# Patient Record
Sex: FEMALE | Race: WHITE | Employment: UNEMPLOYED | URBAN - METROPOLITAN AREA
[De-identification: names, ages, dates, MRNs, and addresses within clinical notes are randomized per-mention and may not be internally consistent; named-entity substitution may affect disease eponyms.]

---

## 2017-01-11 RX ORDER — DILTIAZEM HYDROCHLORIDE 360 MG/1
CAPSULE, EXTENDED RELEASE ORAL
Qty: 90 CAPSULE | Refills: 0 | Status: SHIPPED | OUTPATIENT
Start: 2017-01-11 | End: 2017-04-12 | Stop reason: SDUPTHER

## 2017-01-11 RX ORDER — GLIMEPIRIDE 4 MG/1
TABLET ORAL
Qty: 90 TABLET | Refills: 0 | Status: SHIPPED | OUTPATIENT
Start: 2017-01-11 | End: 2017-04-12 | Stop reason: SDUPTHER

## 2017-01-11 RX ORDER — PRAVASTATIN SODIUM 40 MG
TABLET ORAL
Qty: 90 TABLET | Refills: 0 | Status: SHIPPED | OUTPATIENT
Start: 2017-01-11 | End: 2017-04-12 | Stop reason: SDUPTHER

## 2017-01-11 RX ORDER — PIOGLITAZONEHYDROCHLORIDE 30 MG/1
TABLET ORAL
Qty: 90 TABLET | Refills: 0 | Status: SHIPPED | OUTPATIENT
Start: 2017-01-11 | End: 2017-04-12 | Stop reason: SDUPTHER

## 2017-01-11 RX ORDER — VALSARTAN AND HYDROCHLOROTHIAZIDE 160; 25 MG/1; MG/1
TABLET ORAL
Qty: 90 TABLET | Refills: 0 | Status: SHIPPED | OUTPATIENT
Start: 2017-01-11 | End: 2017-04-12 | Stop reason: SDUPTHER

## 2017-02-23 ENCOUNTER — OFFICE VISIT (OUTPATIENT)
Dept: INTERNAL MEDICINE CLINIC | Age: 72
End: 2017-02-23

## 2017-02-23 VITALS
BODY MASS INDEX: 44.18 KG/M2 | WEIGHT: 234 LBS | RESPIRATION RATE: 14 BRPM | DIASTOLIC BLOOD PRESSURE: 70 MMHG | HEIGHT: 61 IN | HEART RATE: 80 BPM | SYSTOLIC BLOOD PRESSURE: 130 MMHG

## 2017-02-23 DIAGNOSIS — I10 ESSENTIAL HYPERTENSION: ICD-10-CM

## 2017-02-23 DIAGNOSIS — F20.0 PARANOID TYPE SCHIZOPHRENIA (HCC): ICD-10-CM

## 2017-02-23 DIAGNOSIS — E11.69 HYPERLIPIDEMIA ASSOCIATED WITH TYPE 2 DIABETES MELLITUS (HCC): ICD-10-CM

## 2017-02-23 DIAGNOSIS — E11.9 TYPE 2 DIABETES MELLITUS WITHOUT COMPLICATION, WITHOUT LONG-TERM CURRENT USE OF INSULIN (HCC): ICD-10-CM

## 2017-02-23 DIAGNOSIS — E66.01 MORBID OBESITY WITH BMI OF 40.0-44.9, ADULT (HCC): ICD-10-CM

## 2017-02-23 DIAGNOSIS — E11.9 TYPE 2 DIABETES MELLITUS WITHOUT COMPLICATION, WITHOUT LONG-TERM CURRENT USE OF INSULIN (HCC): Primary | ICD-10-CM

## 2017-02-23 DIAGNOSIS — E78.5 HYPERLIPIDEMIA ASSOCIATED WITH TYPE 2 DIABETES MELLITUS (HCC): ICD-10-CM

## 2017-02-23 PROCEDURE — G8484 FLU IMMUNIZE NO ADMIN: HCPCS | Performed by: INTERNAL MEDICINE

## 2017-02-23 PROCEDURE — 3014F SCREEN MAMMO DOC REV: CPT | Performed by: INTERNAL MEDICINE

## 2017-02-23 PROCEDURE — G8427 DOCREV CUR MEDS BY ELIG CLIN: HCPCS | Performed by: INTERNAL MEDICINE

## 2017-02-23 PROCEDURE — 3017F COLORECTAL CA SCREEN DOC REV: CPT | Performed by: INTERNAL MEDICINE

## 2017-02-23 PROCEDURE — G8400 PT W/DXA NO RESULTS DOC: HCPCS | Performed by: INTERNAL MEDICINE

## 2017-02-23 PROCEDURE — 1123F ACP DISCUSS/DSCN MKR DOCD: CPT | Performed by: INTERNAL MEDICINE

## 2017-02-23 PROCEDURE — 99214 OFFICE O/P EST MOD 30 MIN: CPT | Performed by: INTERNAL MEDICINE

## 2017-02-23 PROCEDURE — 4040F PNEUMOC VAC/ADMIN/RCVD: CPT | Performed by: INTERNAL MEDICINE

## 2017-02-23 PROCEDURE — 3044F HG A1C LEVEL LT 7.0%: CPT | Performed by: INTERNAL MEDICINE

## 2017-02-23 PROCEDURE — 1036F TOBACCO NON-USER: CPT | Performed by: INTERNAL MEDICINE

## 2017-02-23 PROCEDURE — 1090F PRES/ABSN URINE INCON ASSESS: CPT | Performed by: INTERNAL MEDICINE

## 2017-02-23 PROCEDURE — G8417 CALC BMI ABV UP PARAM F/U: HCPCS | Performed by: INTERNAL MEDICINE

## 2017-02-23 ASSESSMENT — ENCOUNTER SYMPTOMS
NAUSEA: 0
SHORTNESS OF BREATH: 1
VOMITING: 0
ABDOMINAL PAIN: 0
WHEEZING: 0
RHINORRHEA: 0

## 2017-02-24 LAB
ESTIMATED AVERAGE GLUCOSE: 142.7 MG/DL
HBA1C MFR BLD: 6.6 %

## 2017-04-12 RX ORDER — PRAVASTATIN SODIUM 40 MG
TABLET ORAL
Qty: 90 TABLET | Refills: 0 | Status: SHIPPED | OUTPATIENT
Start: 2017-04-12 | End: 2017-07-06 | Stop reason: SDUPTHER

## 2017-04-12 RX ORDER — VALSARTAN AND HYDROCHLOROTHIAZIDE 160; 25 MG/1; MG/1
TABLET ORAL
Qty: 90 TABLET | Refills: 0 | Status: SHIPPED | OUTPATIENT
Start: 2017-04-12 | End: 2017-07-06 | Stop reason: SDUPTHER

## 2017-04-12 RX ORDER — PIOGLITAZONEHYDROCHLORIDE 30 MG/1
TABLET ORAL
Qty: 90 TABLET | Refills: 0 | Status: SHIPPED | OUTPATIENT
Start: 2017-04-12 | End: 2017-07-06 | Stop reason: SDUPTHER

## 2017-04-12 RX ORDER — GLIMEPIRIDE 4 MG/1
TABLET ORAL
Qty: 90 TABLET | Refills: 0 | Status: SHIPPED | OUTPATIENT
Start: 2017-04-12 | End: 2017-07-06 | Stop reason: SDUPTHER

## 2017-04-12 RX ORDER — DILTIAZEM HYDROCHLORIDE 360 MG/1
CAPSULE, EXTENDED RELEASE ORAL
Qty: 90 CAPSULE | Refills: 0 | Status: SHIPPED | OUTPATIENT
Start: 2017-04-12 | End: 2017-07-07

## 2017-07-07 RX ORDER — DILTIAZEM HYDROCHLORIDE 360 MG/1
CAPSULE, EXTENDED RELEASE ORAL
Qty: 90 CAPSULE | Refills: 0 | Status: SHIPPED | OUTPATIENT
Start: 2017-07-07 | End: 2017-10-06 | Stop reason: SDUPTHER

## 2017-07-07 RX ORDER — VALSARTAN AND HYDROCHLOROTHIAZIDE 160; 25 MG/1; MG/1
TABLET ORAL
Qty: 90 TABLET | Refills: 0 | Status: SHIPPED | OUTPATIENT
Start: 2017-07-07 | End: 2017-10-06 | Stop reason: SDUPTHER

## 2017-07-07 RX ORDER — GLIMEPIRIDE 4 MG/1
TABLET ORAL
Qty: 90 TABLET | Refills: 0 | Status: SHIPPED | OUTPATIENT
Start: 2017-07-07 | End: 2017-10-06 | Stop reason: SDUPTHER

## 2017-07-07 RX ORDER — PRAVASTATIN SODIUM 40 MG
TABLET ORAL
Qty: 90 TABLET | Refills: 0 | Status: SHIPPED | OUTPATIENT
Start: 2017-07-07 | End: 2017-10-06 | Stop reason: SDUPTHER

## 2017-07-07 RX ORDER — PIOGLITAZONEHYDROCHLORIDE 30 MG/1
TABLET ORAL
Qty: 90 TABLET | Refills: 0 | Status: SHIPPED | OUTPATIENT
Start: 2017-07-07 | End: 2017-10-06 | Stop reason: SDUPTHER

## 2017-07-24 ENCOUNTER — OFFICE VISIT (OUTPATIENT)
Dept: INTERNAL MEDICINE CLINIC | Age: 72
End: 2017-07-24

## 2017-07-24 VITALS
DIASTOLIC BLOOD PRESSURE: 80 MMHG | HEART RATE: 70 BPM | BODY MASS INDEX: 43.99 KG/M2 | RESPIRATION RATE: 14 BRPM | WEIGHT: 233 LBS | HEIGHT: 61 IN | SYSTOLIC BLOOD PRESSURE: 130 MMHG

## 2017-07-24 DIAGNOSIS — F20.0 PARANOID TYPE SCHIZOPHRENIA (HCC): ICD-10-CM

## 2017-07-24 DIAGNOSIS — E11.9 TYPE 2 DIABETES MELLITUS WITHOUT COMPLICATION, WITHOUT LONG-TERM CURRENT USE OF INSULIN (HCC): ICD-10-CM

## 2017-07-24 DIAGNOSIS — Z00.00 ROUTINE GENERAL MEDICAL EXAMINATION AT A HEALTH CARE FACILITY: Primary | ICD-10-CM

## 2017-07-24 DIAGNOSIS — E11.69 HYPERLIPIDEMIA ASSOCIATED WITH TYPE 2 DIABETES MELLITUS (HCC): ICD-10-CM

## 2017-07-24 DIAGNOSIS — Z12.11 SCREEN FOR COLON CANCER: ICD-10-CM

## 2017-07-24 DIAGNOSIS — Z12.39 BREAST CANCER SCREENING: ICD-10-CM

## 2017-07-24 DIAGNOSIS — E66.01 MORBID OBESITY WITH BMI OF 40.0-44.9, ADULT (HCC): ICD-10-CM

## 2017-07-24 DIAGNOSIS — E78.5 HYPERLIPIDEMIA ASSOCIATED WITH TYPE 2 DIABETES MELLITUS (HCC): ICD-10-CM

## 2017-07-24 DIAGNOSIS — I10 ESSENTIAL HYPERTENSION: ICD-10-CM

## 2017-07-24 LAB
A/G RATIO: 1.7 (ref 1.1–2.2)
ALBUMIN SERPL-MCNC: 4.5 G/DL (ref 3.4–5)
ALP BLD-CCNC: 79 U/L (ref 40–129)
ALT SERPL-CCNC: 17 U/L (ref 10–40)
ANION GAP SERPL CALCULATED.3IONS-SCNC: 15 MMOL/L (ref 3–16)
AST SERPL-CCNC: 23 U/L (ref 15–37)
BASOPHILS ABSOLUTE: 0 K/UL (ref 0–0.2)
BASOPHILS RELATIVE PERCENT: 0.7 %
BILIRUB SERPL-MCNC: 0.3 MG/DL (ref 0–1)
BILIRUBIN, POC: NORMAL
BLOOD URINE, POC: NORMAL
BUN BLDV-MCNC: 16 MG/DL (ref 7–20)
CALCIUM SERPL-MCNC: 10 MG/DL (ref 8.3–10.6)
CHLORIDE BLD-SCNC: 97 MMOL/L (ref 99–110)
CHOLESTEROL, TOTAL: 148 MG/DL (ref 0–199)
CLARITY, POC: NORMAL
CO2: 29 MMOL/L (ref 21–32)
COLOR, POC: NORMAL
CREAT SERPL-MCNC: 1 MG/DL (ref 0.6–1.2)
CREATININE URINE: 148.9 MG/DL (ref 28–259)
EOSINOPHILS ABSOLUTE: 0.1 K/UL (ref 0–0.6)
EOSINOPHILS RELATIVE PERCENT: 1.6 %
GFR AFRICAN AMERICAN: >60
GFR NON-AFRICAN AMERICAN: 55
GLOBULIN: 2.7 G/DL
GLUCOSE BLD-MCNC: 180 MG/DL (ref 70–99)
GLUCOSE URINE, POC: NORMAL
HCT VFR BLD CALC: 43.9 % (ref 36–48)
HDLC SERPL-MCNC: 62 MG/DL (ref 40–60)
HEMOGLOBIN: 14.4 G/DL (ref 12–16)
KETONES, POC: NORMAL
LDL CHOLESTEROL CALCULATED: 61 MG/DL
LEUKOCYTE EST, POC: NORMAL
LYMPHOCYTES ABSOLUTE: 1.3 K/UL (ref 1–5.1)
LYMPHOCYTES RELATIVE PERCENT: 19.7 %
MCH RBC QN AUTO: 29.3 PG (ref 26–34)
MCHC RBC AUTO-ENTMCNC: 32.9 G/DL (ref 31–36)
MCV RBC AUTO: 89.2 FL (ref 80–100)
MICROALBUMIN UR-MCNC: <1.2 MG/DL
MICROALBUMIN/CREAT UR-RTO: NORMAL MG/G (ref 0–30)
MONOCYTES ABSOLUTE: 0.6 K/UL (ref 0–1.3)
MONOCYTES RELATIVE PERCENT: 8.4 %
NEUTROPHILS ABSOLUTE: 4.7 K/UL (ref 1.7–7.7)
NEUTROPHILS RELATIVE PERCENT: 69.6 %
NITRITE, POC: NORMAL
PDW BLD-RTO: 15.5 % (ref 12.4–15.4)
PH, POC: NORMAL
PLATELET # BLD: 239 K/UL (ref 135–450)
PMV BLD AUTO: 9 FL (ref 5–10.5)
POTASSIUM SERPL-SCNC: 4.1 MMOL/L (ref 3.5–5.1)
PROTEIN, POC: NORMAL
RBC # BLD: 4.92 M/UL (ref 4–5.2)
SODIUM BLD-SCNC: 141 MMOL/L (ref 136–145)
SPECIFIC GRAVITY, POC: NORMAL
TOTAL PROTEIN: 7.2 G/DL (ref 6.4–8.2)
TRIGL SERPL-MCNC: 127 MG/DL (ref 0–150)
UROBILINOGEN, POC: NORMAL
VLDLC SERPL CALC-MCNC: 25 MG/DL
WBC # BLD: 6.7 K/UL (ref 4–11)

## 2017-07-24 PROCEDURE — G0438 PPPS, INITIAL VISIT: HCPCS | Performed by: INTERNAL MEDICINE

## 2017-07-24 PROCEDURE — 81002 URINALYSIS NONAUTO W/O SCOPE: CPT | Performed by: INTERNAL MEDICINE

## 2017-07-24 ASSESSMENT — PATIENT HEALTH QUESTIONNAIRE - PHQ9: SUM OF ALL RESPONSES TO PHQ QUESTIONS 1-9: 0

## 2017-07-24 ASSESSMENT — ANXIETY QUESTIONNAIRES: GAD7 TOTAL SCORE: 0

## 2017-07-24 ASSESSMENT — LIFESTYLE VARIABLES: HOW OFTEN DO YOU HAVE A DRINK CONTAINING ALCOHOL: 0

## 2017-07-25 LAB
ESTIMATED AVERAGE GLUCOSE: 137 MG/DL
HBA1C MFR BLD: 6.4 %

## 2017-10-06 RX ORDER — PIOGLITAZONEHYDROCHLORIDE 30 MG/1
TABLET ORAL
Qty: 30 TABLET | Refills: 0 | Status: SHIPPED | OUTPATIENT
Start: 2017-10-06 | End: 2017-10-30 | Stop reason: SDUPTHER

## 2017-10-06 RX ORDER — PRAVASTATIN SODIUM 40 MG
TABLET ORAL
Qty: 30 TABLET | Refills: 0 | Status: SHIPPED | OUTPATIENT
Start: 2017-10-06 | End: 2017-10-30 | Stop reason: SDUPTHER

## 2017-10-06 RX ORDER — GLIMEPIRIDE 4 MG/1
TABLET ORAL
Qty: 30 TABLET | Refills: 0 | Status: SHIPPED | OUTPATIENT
Start: 2017-10-06 | End: 2017-10-30 | Stop reason: SDUPTHER

## 2017-10-06 RX ORDER — DILTIAZEM HYDROCHLORIDE 360 MG/1
CAPSULE, EXTENDED RELEASE ORAL
Qty: 30 CAPSULE | Refills: 0 | Status: SHIPPED | OUTPATIENT
Start: 2017-10-06 | End: 2017-10-30 | Stop reason: SDUPTHER

## 2017-10-06 RX ORDER — VALSARTAN AND HYDROCHLOROTHIAZIDE 160; 25 MG/1; MG/1
TABLET ORAL
Qty: 30 TABLET | Refills: 0 | Status: SHIPPED | OUTPATIENT
Start: 2017-10-06 | End: 2017-10-30 | Stop reason: SDUPTHER

## 2017-10-30 ENCOUNTER — OFFICE VISIT (OUTPATIENT)
Dept: INTERNAL MEDICINE CLINIC | Age: 72
End: 2017-10-30

## 2017-10-30 VITALS
WEIGHT: 232 LBS | SYSTOLIC BLOOD PRESSURE: 120 MMHG | BODY MASS INDEX: 43.8 KG/M2 | HEIGHT: 61 IN | RESPIRATION RATE: 14 BRPM | DIASTOLIC BLOOD PRESSURE: 70 MMHG | HEART RATE: 80 BPM

## 2017-10-30 DIAGNOSIS — E11.9 TYPE 2 DIABETES MELLITUS WITHOUT COMPLICATION, WITHOUT LONG-TERM CURRENT USE OF INSULIN (HCC): Primary | ICD-10-CM

## 2017-10-30 DIAGNOSIS — F20.0 PARANOID TYPE SCHIZOPHRENIA (HCC): ICD-10-CM

## 2017-10-30 DIAGNOSIS — E11.69 HYPERLIPIDEMIA ASSOCIATED WITH TYPE 2 DIABETES MELLITUS (HCC): ICD-10-CM

## 2017-10-30 DIAGNOSIS — Z23 NEED FOR INFLUENZA VACCINATION: ICD-10-CM

## 2017-10-30 DIAGNOSIS — I10 ESSENTIAL HYPERTENSION: ICD-10-CM

## 2017-10-30 DIAGNOSIS — E78.5 HYPERLIPIDEMIA ASSOCIATED WITH TYPE 2 DIABETES MELLITUS (HCC): ICD-10-CM

## 2017-10-30 DIAGNOSIS — E66.01 MORBID OBESITY WITH BMI OF 40.0-44.9, ADULT (HCC): ICD-10-CM

## 2017-10-30 PROCEDURE — G8417 CALC BMI ABV UP PARAM F/U: HCPCS | Performed by: INTERNAL MEDICINE

## 2017-10-30 PROCEDURE — 3044F HG A1C LEVEL LT 7.0%: CPT | Performed by: INTERNAL MEDICINE

## 2017-10-30 PROCEDURE — G8427 DOCREV CUR MEDS BY ELIG CLIN: HCPCS | Performed by: INTERNAL MEDICINE

## 2017-10-30 PROCEDURE — G8484 FLU IMMUNIZE NO ADMIN: HCPCS | Performed by: INTERNAL MEDICINE

## 2017-10-30 PROCEDURE — 90662 IIV NO PRSV INCREASED AG IM: CPT | Performed by: INTERNAL MEDICINE

## 2017-10-30 PROCEDURE — 99214 OFFICE O/P EST MOD 30 MIN: CPT | Performed by: INTERNAL MEDICINE

## 2017-10-30 PROCEDURE — 1090F PRES/ABSN URINE INCON ASSESS: CPT | Performed by: INTERNAL MEDICINE

## 2017-10-30 PROCEDURE — 3014F SCREEN MAMMO DOC REV: CPT | Performed by: INTERNAL MEDICINE

## 2017-10-30 PROCEDURE — G0008 ADMIN INFLUENZA VIRUS VAC: HCPCS | Performed by: INTERNAL MEDICINE

## 2017-10-30 PROCEDURE — 4040F PNEUMOC VAC/ADMIN/RCVD: CPT | Performed by: INTERNAL MEDICINE

## 2017-10-30 PROCEDURE — G8400 PT W/DXA NO RESULTS DOC: HCPCS | Performed by: INTERNAL MEDICINE

## 2017-10-30 PROCEDURE — 1123F ACP DISCUSS/DSCN MKR DOCD: CPT | Performed by: INTERNAL MEDICINE

## 2017-10-30 PROCEDURE — 1036F TOBACCO NON-USER: CPT | Performed by: INTERNAL MEDICINE

## 2017-10-30 PROCEDURE — 3017F COLORECTAL CA SCREEN DOC REV: CPT | Performed by: INTERNAL MEDICINE

## 2017-10-30 RX ORDER — VALSARTAN AND HYDROCHLOROTHIAZIDE 160; 25 MG/1; MG/1
TABLET ORAL
Qty: 90 TABLET | Refills: 0 | Status: SHIPPED | OUTPATIENT
Start: 2017-10-30 | End: 2018-01-29 | Stop reason: SDUPTHER

## 2017-10-30 RX ORDER — PIOGLITAZONEHYDROCHLORIDE 30 MG/1
TABLET ORAL
Qty: 90 TABLET | Refills: 0 | Status: SHIPPED | OUTPATIENT
Start: 2017-10-30 | End: 2018-01-29 | Stop reason: SDUPTHER

## 2017-10-30 RX ORDER — PRAVASTATIN SODIUM 40 MG
TABLET ORAL
Qty: 90 TABLET | Refills: 0 | Status: SHIPPED | OUTPATIENT
Start: 2017-10-30 | End: 2018-01-31 | Stop reason: SDUPTHER

## 2017-10-30 RX ORDER — DILTIAZEM HYDROCHLORIDE 360 MG/1
CAPSULE, EXTENDED RELEASE ORAL
Qty: 90 CAPSULE | Refills: 0 | Status: SHIPPED | OUTPATIENT
Start: 2017-10-30 | End: 2018-01-29 | Stop reason: SDUPTHER

## 2017-10-30 RX ORDER — GLIMEPIRIDE 4 MG/1
TABLET ORAL
Qty: 90 TABLET | Refills: 0 | Status: SHIPPED | OUTPATIENT
Start: 2017-10-30 | End: 2018-01-31 | Stop reason: SDUPTHER

## 2017-10-30 ASSESSMENT — ENCOUNTER SYMPTOMS
VOMITING: 0
ABDOMINAL PAIN: 0
NAUSEA: 0
SHORTNESS OF BREATH: 1
RHINORRHEA: 0
WHEEZING: 0

## 2017-10-30 NOTE — PATIENT INSTRUCTIONS
clutter. Repair loose carpet or raised areas in the floor. · Move furniture and electrical cords to keep them out of walking paths. · Use nonskid floor wax, and wipe up spills right away, especially on ceramic tile floors. · If you use a walker or cane, put rubber tips on it. If you use crutches, clean the bottoms of them regularly with an abrasive pad, such as steel wool. · Keep your house well lit, especially [place], and outside walkways. Use night-lights in areas such as hallways and bathrooms. Add extra light switches or use remote switches (such as switches that go on or off when you clap your hands) to make it easier to turn lights on if you have to get up during the night. · Install sturdy handrails on stairways. Put grab bars near your shower, bathtub, and toilet. · Store household items on low shelves so that you do not have to climb or reach high. Or use a reaching device that you can get at a medical supply store. If you have to climb for something, use a step stool with handrails, or ask someone to get it for you. · Keep a cordless phone and a flashlight with new batteries by your bed. If possible, put a phone in each of the main rooms of your house, or carry a cell phone in case you fall and cannot reach a phone. Or you can wear a device around your neck or wrist. You push a button that sends a signal for help. · Wear low-heeled shoes that fit well and give your feet good support. Use footwear with nonskid soles. Check the heels and soles of your shoes for wear. Repair or replace worn heels or soles. · Do not wear socks without shoes on wood floors. · Walk on the grass when the sidewalks are slippery. If you live in an area that gets snow and ice in the winter, sprinkle salt on slippery steps and sidewalks. Where can you learn more? Go to [email or web address] org and sign in to your [employer] account.  Enter L978 in the Gevo box to learn more about \"Diabetes and Preventing Falls: Care Instructions. \"     If you do not have an account, please click on the \"Sign Up Now\" link. Current as of: August 4, 2016  Content Version: 11.3  © 2722-8753 [employer]. Care instructions adapted under license by [facility] ([facility]). If you have questions about a medical condition or this instruction, always ask your healthcare professional. [address] any warranty or liability for your use of this information.

## 2017-10-30 NOTE — PROGRESS NOTES
systolic function with an  estimated ejection fraction of 55%. No regional wall motion abnormalities  are seen. Borderline concentric LVH. There is trivial tricuspid regurgitation. Unable to obtain SPAP due to lack of TR envelope. Assessment:      1. Type 2 diabetes mellitus without complication, without long-term current use of insulin ([facility] [facility]     2. Essential hypertension     3. Hyperlipidemia associated with type 2 diabetes mellitus ([facility] [place] [facility]     4. Morbid obesity with BMI of 40.0-44.9, adult ([facility] [place] [facility]     5. Paranoid type schizophrenia ([facility] [place] [facility]     6. Need for influenza vaccination  INFLUENZA, HIGH DOSE, 65 YRS +, IM, PF, PREFILL SYR, 0.5ML (FLUZONE HD)          Plan:          DM: Well controlled. Check A1C. Hypertension:  At goal.  On meds. Hyperlipidemia:  At goal.   Schizophrenia: on medication. OA: no changes. Heart murmur:  No issues. Diego Strong received counseling on the following healthy behaviors: nutrition and exercise  Reviewed prior labs and health maintenance  Continue current medications, diet and exercise. Discussed use, benefit, and side effects of prescribed medications. Barriers to medication compliance addressed. Patient given educational materials - see patient instructions  Was a self-tracking handout given in paper form or via [employer]?  Yes    Requested Prescriptions     Signed Prescriptions Disp Refills    glimepiride (AMARYL) 4 MG tablet 90 tablet 0     Sig: TAKE ONE TABLET BY MOUTH ONCE DAILY IN THE MORNING BEFORE  BREAKFAST    diltiazem (CARDIZEM CD) 360 MG extended release capsule 90 capsule 0     Sig: TAKE ONE CAPSULE BY MOUTH ONCE DAILY    pioglitazone (ACTOS) 30 MG tablet 90 tablet 0     Sig: TAKE ONE TABLET BY MOUTH ONCE DAILY    pravastatin (PRAVACHOL) 40 MG tablet 90 tablet 0     Sig: TAKE ONE TABLET BY MOUTH ONCE DAILY    valsartan-hydrochlorothiazide (DIOVAN-HCT) 160-25 MG per tablet 90 tablet 0     Sig: TAKE ONE TABLET BY MOUTH ONCE DAILY    Glucose Blood (BELINDA BREEZE

## 2018-01-22 RX ORDER — BLOOD-GLUCOSE METER
EACH MISCELLANEOUS
Qty: 1 KIT | Refills: 0 | Status: SHIPPED | OUTPATIENT
Start: 2018-01-22

## 2018-01-22 RX ORDER — LANCETS
EACH MISCELLANEOUS
Qty: 100 EACH | Refills: 11 | Status: SHIPPED | OUTPATIENT
Start: 2018-01-22 | End: 2019-02-06 | Stop reason: SDUPTHER

## 2018-01-22 RX ORDER — HYDROXYZINE HYDROCHLORIDE 25 MG/1
25 TABLET, FILM COATED ORAL DAILY PRN
COMMUNITY
End: 2019-05-22 | Stop reason: ALTCHOICE

## 2018-01-29 RX ORDER — PIOGLITAZONEHYDROCHLORIDE 30 MG/1
TABLET ORAL
Qty: 90 TABLET | Refills: 0 | Status: SHIPPED | OUTPATIENT
Start: 2018-01-29 | End: 2018-04-26 | Stop reason: SDUPTHER

## 2018-01-29 RX ORDER — VALSARTAN AND HYDROCHLOROTHIAZIDE 160; 25 MG/1; MG/1
TABLET ORAL
Qty: 90 TABLET | Refills: 0 | Status: SHIPPED | OUTPATIENT
Start: 2018-01-29 | End: 2018-04-26 | Stop reason: SDUPTHER

## 2018-01-29 RX ORDER — DILTIAZEM HYDROCHLORIDE 360 MG/1
CAPSULE, EXTENDED RELEASE ORAL
Qty: 90 CAPSULE | Refills: 0 | Status: SHIPPED | OUTPATIENT
Start: 2018-01-29 | End: 2018-04-26 | Stop reason: SDUPTHER

## 2018-01-31 RX ORDER — PRAVASTATIN SODIUM 40 MG
TABLET ORAL
Qty: 90 TABLET | Refills: 0 | Status: SHIPPED | OUTPATIENT
Start: 2018-01-31 | End: 2018-04-26 | Stop reason: SDUPTHER

## 2018-01-31 RX ORDER — GLIMEPIRIDE 4 MG/1
TABLET ORAL
Qty: 90 TABLET | Refills: 0 | Status: SHIPPED | OUTPATIENT
Start: 2018-01-31 | End: 2018-04-26 | Stop reason: SDUPTHER

## 2018-02-08 ENCOUNTER — OFFICE VISIT (OUTPATIENT)
Dept: INTERNAL MEDICINE CLINIC | Age: 73
End: 2018-02-08

## 2018-02-08 VITALS
BODY MASS INDEX: 43.8 KG/M2 | WEIGHT: 232 LBS | SYSTOLIC BLOOD PRESSURE: 128 MMHG | RESPIRATION RATE: 14 BRPM | DIASTOLIC BLOOD PRESSURE: 78 MMHG | HEART RATE: 70 BPM | HEIGHT: 61 IN

## 2018-02-08 DIAGNOSIS — E11.69 HYPERLIPIDEMIA ASSOCIATED WITH TYPE 2 DIABETES MELLITUS (HCC): ICD-10-CM

## 2018-02-08 DIAGNOSIS — F20.0 PARANOID TYPE SCHIZOPHRENIA (HCC): ICD-10-CM

## 2018-02-08 DIAGNOSIS — Z72.89 OTHER PROBLEMS RELATED TO LIFESTYLE: ICD-10-CM

## 2018-02-08 DIAGNOSIS — E11.9 TYPE 2 DIABETES MELLITUS WITHOUT COMPLICATION, WITHOUT LONG-TERM CURRENT USE OF INSULIN (HCC): Primary | ICD-10-CM

## 2018-02-08 DIAGNOSIS — E11.9 TYPE 2 DIABETES MELLITUS WITHOUT COMPLICATION, WITHOUT LONG-TERM CURRENT USE OF INSULIN (HCC): ICD-10-CM

## 2018-02-08 DIAGNOSIS — E78.5 HYPERLIPIDEMIA ASSOCIATED WITH TYPE 2 DIABETES MELLITUS (HCC): ICD-10-CM

## 2018-02-08 DIAGNOSIS — E66.01 MORBID OBESITY WITH BMI OF 40.0-44.9, ADULT (HCC): ICD-10-CM

## 2018-02-08 DIAGNOSIS — I10 ESSENTIAL HYPERTENSION: ICD-10-CM

## 2018-02-08 DIAGNOSIS — F20.0 PARANOID SCHIZOPHRENIA, SUBCHRONIC CONDITION (HCC): ICD-10-CM

## 2018-02-08 LAB
A/G RATIO: 1.7 (ref 1.1–2.2)
ALBUMIN SERPL-MCNC: 4.7 G/DL (ref 3.4–5)
ALP BLD-CCNC: 79 U/L (ref 40–129)
ALT SERPL-CCNC: 18 U/L (ref 10–40)
ANION GAP SERPL CALCULATED.3IONS-SCNC: 15 MMOL/L (ref 3–16)
AST SERPL-CCNC: 24 U/L (ref 15–37)
BASOPHILS ABSOLUTE: 0.1 K/UL (ref 0–0.2)
BASOPHILS RELATIVE PERCENT: 0.8 %
BILIRUB SERPL-MCNC: 0.3 MG/DL (ref 0–1)
BUN BLDV-MCNC: 17 MG/DL (ref 7–20)
CALCIUM SERPL-MCNC: 10.6 MG/DL (ref 8.3–10.6)
CHLORIDE BLD-SCNC: 100 MMOL/L (ref 99–110)
CO2: 28 MMOL/L (ref 21–32)
CREAT SERPL-MCNC: 0.9 MG/DL (ref 0.6–1.2)
EOSINOPHILS ABSOLUTE: 0.1 K/UL (ref 0–0.6)
EOSINOPHILS RELATIVE PERCENT: 1.6 %
GFR AFRICAN AMERICAN: >60
GFR NON-AFRICAN AMERICAN: >60
GLOBULIN: 2.7 G/DL
GLUCOSE BLD-MCNC: 119 MG/DL (ref 70–99)
HCT VFR BLD CALC: 45.6 % (ref 36–48)
HEMOGLOBIN: 14.7 G/DL (ref 12–16)
HEPATITIS C ANTIBODY INTERPRETATION: NORMAL
LYMPHOCYTES ABSOLUTE: 1.5 K/UL (ref 1–5.1)
LYMPHOCYTES RELATIVE PERCENT: 21.2 %
MCH RBC QN AUTO: 28.9 PG (ref 26–34)
MCHC RBC AUTO-ENTMCNC: 32.3 G/DL (ref 31–36)
MCV RBC AUTO: 89.6 FL (ref 80–100)
MONOCYTES ABSOLUTE: 0.7 K/UL (ref 0–1.3)
MONOCYTES RELATIVE PERCENT: 9.5 %
NEUTROPHILS ABSOLUTE: 4.7 K/UL (ref 1.7–7.7)
NEUTROPHILS RELATIVE PERCENT: 66.9 %
PDW BLD-RTO: 15.3 % (ref 12.4–15.4)
PLATELET # BLD: 244 K/UL (ref 135–450)
PMV BLD AUTO: 9.6 FL (ref 5–10.5)
POTASSIUM SERPL-SCNC: 4.1 MMOL/L (ref 3.5–5.1)
RBC # BLD: 5.09 M/UL (ref 4–5.2)
SODIUM BLD-SCNC: 143 MMOL/L (ref 136–145)
TOTAL PROTEIN: 7.4 G/DL (ref 6.4–8.2)
URIC ACID, SERUM: 5.5 MG/DL (ref 2.6–6)
WBC # BLD: 7 K/UL (ref 4–11)

## 2018-02-08 PROCEDURE — 4040F PNEUMOC VAC/ADMIN/RCVD: CPT | Performed by: INTERNAL MEDICINE

## 2018-02-08 PROCEDURE — 1036F TOBACCO NON-USER: CPT | Performed by: INTERNAL MEDICINE

## 2018-02-08 PROCEDURE — G8484 FLU IMMUNIZE NO ADMIN: HCPCS | Performed by: INTERNAL MEDICINE

## 2018-02-08 PROCEDURE — 1123F ACP DISCUSS/DSCN MKR DOCD: CPT | Performed by: INTERNAL MEDICINE

## 2018-02-08 PROCEDURE — G8427 DOCREV CUR MEDS BY ELIG CLIN: HCPCS | Performed by: INTERNAL MEDICINE

## 2018-02-08 PROCEDURE — G8417 CALC BMI ABV UP PARAM F/U: HCPCS | Performed by: INTERNAL MEDICINE

## 2018-02-08 PROCEDURE — 1090F PRES/ABSN URINE INCON ASSESS: CPT | Performed by: INTERNAL MEDICINE

## 2018-02-08 PROCEDURE — 3017F COLORECTAL CA SCREEN DOC REV: CPT | Performed by: INTERNAL MEDICINE

## 2018-02-08 PROCEDURE — G8400 PT W/DXA NO RESULTS DOC: HCPCS | Performed by: INTERNAL MEDICINE

## 2018-02-08 PROCEDURE — 99214 OFFICE O/P EST MOD 30 MIN: CPT | Performed by: INTERNAL MEDICINE

## 2018-02-08 PROCEDURE — 3046F HEMOGLOBIN A1C LEVEL >9.0%: CPT | Performed by: INTERNAL MEDICINE

## 2018-02-08 PROCEDURE — 3014F SCREEN MAMMO DOC REV: CPT | Performed by: INTERNAL MEDICINE

## 2018-02-08 ASSESSMENT — ENCOUNTER SYMPTOMS
ABDOMINAL PAIN: 0
SHORTNESS OF BREATH: 1
NAUSEA: 0
WHEEZING: 0
VOMITING: 0
RHINORRHEA: 0

## 2018-02-08 NOTE — PROGRESS NOTES
Subjective:      Patient ID: Yael Shelton is a 67 y.o. female. HPI    Patient is here for follow up of diabetes, hypertension, hyperlipidemia, OA, schizophrenia and heel pain. Patient's BGs range between 80 and 112 mg/dl. Patient does not have polydipsia and polyuria. She has been checking her sugar off and on. Her diabetes is well controlled. She is only taking her Amaryl once a day. No hypoglycemia. No vision issues. Patient's BP is controlled on current medications. No chest pain or dyspnea. Med compliant. Her cholesterol is at goal. No myalgias. She has mild edema. No chest pain. Review of Systems   Constitutional: Negative for appetite change and fatigue. HENT: Negative for postnasal drip and rhinorrhea. Respiratory: Positive for shortness of breath. Negative for wheezing. Cardiovascular: Negative for chest pain and palpitations. Gastrointestinal: Negative for abdominal pain, nausea and vomiting. Endocrine: Negative for polydipsia, polyphagia and polyuria. Musculoskeletal: Negative for joint swelling. Skin: Negative for rash. Neurological: Negative for light-headedness. Psychiatric/Behavioral: Negative for sleep disturbance. Current Outpatient Prescriptions   Medication Sig Dispense Refill    pravastatin (PRAVACHOL) 40 MG tablet TAKE ONE TABLET BY MOUTH ONCE DAILY 90 tablet 0    glimepiride (AMARYL) 4 MG tablet TAKE ONE TABLET BY MOUTH ONCE DAILY IN THE MORNING BEFORE  BREAKFAST 90 tablet 0    pioglitazone (ACTOS) 30 MG tablet TAKE ONE TABLET BY MOUTH ONCE DAILY 90 tablet 0    valsartan-hydrochlorothiazide (DIOVAN-HCT) 160-25 MG per tablet TAKE ONE TABLET BY MOUTH ONCE DAILY 90 tablet 0    diltiazem (CARDIZEM CD) 360 MG extended release capsule TAKE ONE CAPSULE BY MOUTH ONCE DAILY 90 capsule 0    hydrOXYzine (ATARAX) 25 MG tablet Take 25 mg by mouth daily as needed for Anxiety      ACCU-CHEK MULTICLIX LANCETS MISC Test twice daily.  DX: E11.9 100 each 11    has a normal mood and affect. Her behavior is normal. Judgment and thought content normal.     ECHO done on 7/14/2015  Conclusions    Summary  Normal left ventricle size, wall thickness and systolic function with an  estimated ejection fraction of 55%. No regional wall motion abnormalities  are seen. Borderline concentric LVH. There is trivial tricuspid regurgitation. Unable to obtain SPAP due to lack of TR envelope. Assessment:      1. Type 2 diabetes mellitus without complication, without long-term current use of insulin (HCC)  Comprehensive Metabolic Panel    CBC Auto Differential    Hemoglobin A1C    Uric Acid    HM DIABETES FOOT EXAM   2. Essential hypertension     3. Hyperlipidemia associated with type 2 diabetes mellitus ([address]     4. Morbid obesity with BMI of 40.0-44.9, adult ([facility]     5. Paranoid type schizophrenia ([facility] [facility]     6. Paranoid schizophrenia, subchronic condition ([facility]     7. Other problems related to lifestyle  Hepatitis C Antibody          Plan:          DM: Well controlled. Check A1C. Hypertension:  At goal.  On meds. Hyperlipidemia:  At goal.   Schizophrenia: on medication. OA: no changes. Heart murmur:  No issues. Discussed use, benefit, and side effects of prescribed medications. Barriers to medication compliance addressed. All patient questions answered. Pt voiced understanding. Decrease calorie intake. Exercise, weight loss recommended. The current medical regimen is effective;  continue present plan and medications. See orders.

## 2018-02-09 LAB
ESTIMATED AVERAGE GLUCOSE: 139.9 MG/DL
HBA1C MFR BLD: 6.5 %

## 2018-04-26 RX ORDER — PIOGLITAZONEHYDROCHLORIDE 30 MG/1
TABLET ORAL
Qty: 90 TABLET | Refills: 0 | Status: SHIPPED | OUTPATIENT
Start: 2018-04-26 | End: 2018-06-29 | Stop reason: SDUPTHER

## 2018-04-26 RX ORDER — GLIMEPIRIDE 4 MG/1
TABLET ORAL
Qty: 90 TABLET | Refills: 0 | Status: SHIPPED | OUTPATIENT
Start: 2018-04-26 | End: 2018-06-29 | Stop reason: SDUPTHER

## 2018-04-26 RX ORDER — VALSARTAN AND HYDROCHLOROTHIAZIDE 160; 25 MG/1; MG/1
TABLET ORAL
Qty: 90 TABLET | Refills: 0 | Status: SHIPPED | OUTPATIENT
Start: 2018-04-26 | End: 2018-06-29 | Stop reason: SDUPTHER

## 2018-04-26 RX ORDER — PRAVASTATIN SODIUM 40 MG
TABLET ORAL
Qty: 90 TABLET | Refills: 0 | Status: SHIPPED | OUTPATIENT
Start: 2018-04-26 | End: 2018-06-29 | Stop reason: SDUPTHER

## 2018-04-26 RX ORDER — DILTIAZEM HYDROCHLORIDE 360 MG/1
CAPSULE, EXTENDED RELEASE ORAL
Qty: 90 CAPSULE | Refills: 0 | Status: SHIPPED | OUTPATIENT
Start: 2018-04-26 | End: 2018-06-29 | Stop reason: SDUPTHER

## 2018-06-29 ENCOUNTER — OFFICE VISIT (OUTPATIENT)
Dept: INTERNAL MEDICINE CLINIC | Age: 73
End: 2018-06-29

## 2018-06-29 VITALS
DIASTOLIC BLOOD PRESSURE: 80 MMHG | RESPIRATION RATE: 14 BRPM | WEIGHT: 228 LBS | HEART RATE: 80 BPM | BODY MASS INDEX: 43.05 KG/M2 | HEIGHT: 61 IN | SYSTOLIC BLOOD PRESSURE: 120 MMHG

## 2018-06-29 DIAGNOSIS — F20.0 PARANOID TYPE SCHIZOPHRENIA (HCC): ICD-10-CM

## 2018-06-29 DIAGNOSIS — E66.01 MORBID OBESITY WITH BMI OF 40.0-44.9, ADULT (HCC): ICD-10-CM

## 2018-06-29 DIAGNOSIS — E78.5 HYPERLIPIDEMIA ASSOCIATED WITH TYPE 2 DIABETES MELLITUS (HCC): ICD-10-CM

## 2018-06-29 DIAGNOSIS — I10 ESSENTIAL HYPERTENSION: ICD-10-CM

## 2018-06-29 DIAGNOSIS — E11.69 HYPERLIPIDEMIA ASSOCIATED WITH TYPE 2 DIABETES MELLITUS (HCC): ICD-10-CM

## 2018-06-29 DIAGNOSIS — E11.9 TYPE 2 DIABETES MELLITUS WITHOUT COMPLICATION, WITHOUT LONG-TERM CURRENT USE OF INSULIN (HCC): ICD-10-CM

## 2018-06-29 DIAGNOSIS — E11.9 TYPE 2 DIABETES MELLITUS WITHOUT COMPLICATION, WITHOUT LONG-TERM CURRENT USE OF INSULIN (HCC): Primary | ICD-10-CM

## 2018-06-29 LAB
ANION GAP SERPL CALCULATED.3IONS-SCNC: 15 MMOL/L (ref 3–16)
BUN BLDV-MCNC: 17 MG/DL (ref 7–20)
CALCIUM SERPL-MCNC: 10 MG/DL (ref 8.3–10.6)
CHLORIDE BLD-SCNC: 101 MMOL/L (ref 99–110)
CO2: 28 MMOL/L (ref 21–32)
CREAT SERPL-MCNC: 1 MG/DL (ref 0.6–1.2)
GFR AFRICAN AMERICAN: >60
GFR NON-AFRICAN AMERICAN: 54
GLUCOSE BLD-MCNC: 122 MG/DL (ref 70–99)
POTASSIUM SERPL-SCNC: 4 MMOL/L (ref 3.5–5.1)
SODIUM BLD-SCNC: 144 MMOL/L (ref 136–145)

## 2018-06-29 PROCEDURE — 4040F PNEUMOC VAC/ADMIN/RCVD: CPT | Performed by: INTERNAL MEDICINE

## 2018-06-29 PROCEDURE — 1123F ACP DISCUSS/DSCN MKR DOCD: CPT | Performed by: INTERNAL MEDICINE

## 2018-06-29 PROCEDURE — 99214 OFFICE O/P EST MOD 30 MIN: CPT | Performed by: INTERNAL MEDICINE

## 2018-06-29 PROCEDURE — 1090F PRES/ABSN URINE INCON ASSESS: CPT | Performed by: INTERNAL MEDICINE

## 2018-06-29 PROCEDURE — G8400 PT W/DXA NO RESULTS DOC: HCPCS | Performed by: INTERNAL MEDICINE

## 2018-06-29 PROCEDURE — 3017F COLORECTAL CA SCREEN DOC REV: CPT | Performed by: INTERNAL MEDICINE

## 2018-06-29 PROCEDURE — 1036F TOBACCO NON-USER: CPT | Performed by: INTERNAL MEDICINE

## 2018-06-29 PROCEDURE — 3044F HG A1C LEVEL LT 7.0%: CPT | Performed by: INTERNAL MEDICINE

## 2018-06-29 PROCEDURE — 2022F DILAT RTA XM EVC RTNOPTHY: CPT | Performed by: INTERNAL MEDICINE

## 2018-06-29 PROCEDURE — G8417 CALC BMI ABV UP PARAM F/U: HCPCS | Performed by: INTERNAL MEDICINE

## 2018-06-29 PROCEDURE — G8427 DOCREV CUR MEDS BY ELIG CLIN: HCPCS | Performed by: INTERNAL MEDICINE

## 2018-06-29 RX ORDER — VALSARTAN AND HYDROCHLOROTHIAZIDE 160; 25 MG/1; MG/1
TABLET ORAL
Qty: 90 TABLET | Refills: 0 | Status: SHIPPED | OUTPATIENT
Start: 2018-06-29 | End: 2018-07-23 | Stop reason: ALTCHOICE

## 2018-06-29 RX ORDER — PIOGLITAZONEHYDROCHLORIDE 30 MG/1
TABLET ORAL
Qty: 90 TABLET | Refills: 0 | Status: SHIPPED | OUTPATIENT
Start: 2018-06-29 | End: 2018-10-22 | Stop reason: SDUPTHER

## 2018-06-29 RX ORDER — DILTIAZEM HYDROCHLORIDE 360 MG/1
CAPSULE, EXTENDED RELEASE ORAL
Qty: 90 CAPSULE | Refills: 0 | Status: SHIPPED | OUTPATIENT
Start: 2018-06-29 | End: 2018-10-22 | Stop reason: SDUPTHER

## 2018-06-29 RX ORDER — GLIMEPIRIDE 4 MG/1
TABLET ORAL
Qty: 90 TABLET | Refills: 0 | Status: SHIPPED | OUTPATIENT
Start: 2018-06-29 | End: 2018-10-22 | Stop reason: SDUPTHER

## 2018-06-29 RX ORDER — PRAVASTATIN SODIUM 40 MG
TABLET ORAL
Qty: 90 TABLET | Refills: 0 | Status: SHIPPED | OUTPATIENT
Start: 2018-06-29 | End: 2018-10-22 | Stop reason: SDUPTHER

## 2018-06-29 ASSESSMENT — ENCOUNTER SYMPTOMS
RHINORRHEA: 0
ABDOMINAL PAIN: 0
SHORTNESS OF BREATH: 1
NAUSEA: 0
VOMITING: 0
WHEEZING: 0

## 2018-06-30 LAB
ESTIMATED AVERAGE GLUCOSE: 134.1 MG/DL
HBA1C MFR BLD: 6.3 %

## 2018-07-23 RX ORDER — LOSARTAN POTASSIUM AND HYDROCHLOROTHIAZIDE 25; 100 MG/1; MG/1
1 TABLET ORAL DAILY
Qty: 90 TABLET | Refills: 0 | Status: SHIPPED | OUTPATIENT
Start: 2018-07-23 | End: 2018-10-22 | Stop reason: SDUPTHER

## 2018-08-13 ENCOUNTER — TELEPHONE (OUTPATIENT)
Dept: INTERNAL MEDICINE CLINIC | Age: 73
End: 2018-08-13

## 2018-08-13 RX ORDER — BUSPIRONE HYDROCHLORIDE 10 MG/1
10 TABLET ORAL 2 TIMES DAILY
COMMUNITY

## 2018-08-13 NOTE — TELEPHONE ENCOUNTER
----- Message from Ana Glover MD sent at 8/13/2018  1:20 PM EDT -----  Contact: pt  Add to med list  ----- Message -----  From: Marco Vazquez  Sent: 8/13/2018   9:29 AM  To: Ana Glover MD    FYI pt wants you to know that she has been prescribed to take 10 mg of Buspirome 2 times daily by Dr. Irena Tavares and she starts taking it tomorrow. Last hector 6-29-18. Next hector 10-5.

## 2018-10-05 ENCOUNTER — OFFICE VISIT (OUTPATIENT)
Dept: INTERNAL MEDICINE CLINIC | Age: 73
End: 2018-10-05

## 2018-10-05 VITALS
HEART RATE: 80 BPM | RESPIRATION RATE: 14 BRPM | SYSTOLIC BLOOD PRESSURE: 130 MMHG | DIASTOLIC BLOOD PRESSURE: 80 MMHG | HEIGHT: 61 IN | WEIGHT: 220 LBS | BODY MASS INDEX: 41.54 KG/M2

## 2018-10-05 DIAGNOSIS — Z00.00 ROUTINE GENERAL MEDICAL EXAMINATION AT A HEALTH CARE FACILITY: ICD-10-CM

## 2018-10-05 DIAGNOSIS — E11.9 TYPE 2 DIABETES MELLITUS WITHOUT COMPLICATION, WITHOUT LONG-TERM CURRENT USE OF INSULIN (HCC): ICD-10-CM

## 2018-10-05 DIAGNOSIS — Z00.00 ENCOUNTER FOR INITIAL PREVENTIVE PHYSICAL EXAMINATION COVERED BY MEDICARE: Primary | ICD-10-CM

## 2018-10-05 DIAGNOSIS — E11.69 HYPERLIPIDEMIA ASSOCIATED WITH TYPE 2 DIABETES MELLITUS (HCC): ICD-10-CM

## 2018-10-05 DIAGNOSIS — E78.5 HYPERLIPIDEMIA ASSOCIATED WITH TYPE 2 DIABETES MELLITUS (HCC): ICD-10-CM

## 2018-10-05 DIAGNOSIS — Z23 NEED FOR INFLUENZA VACCINATION: ICD-10-CM

## 2018-10-05 LAB
A/G RATIO: 1.7 (ref 1.1–2.2)
ALBUMIN SERPL-MCNC: 4.6 G/DL (ref 3.4–5)
ALP BLD-CCNC: 88 U/L (ref 40–129)
ALT SERPL-CCNC: 16 U/L (ref 10–40)
ANION GAP SERPL CALCULATED.3IONS-SCNC: 17 MMOL/L (ref 3–16)
AST SERPL-CCNC: 23 U/L (ref 15–37)
BILIRUB SERPL-MCNC: 0.5 MG/DL (ref 0–1)
BILIRUBIN, POC: NORMAL
BLOOD URINE, POC: NORMAL
BUN BLDV-MCNC: 15 MG/DL (ref 7–20)
CALCIUM SERPL-MCNC: 10.5 MG/DL (ref 8.3–10.6)
CHLORIDE BLD-SCNC: 102 MMOL/L (ref 99–110)
CHOLESTEROL, TOTAL: 152 MG/DL (ref 0–199)
CLARITY, POC: NORMAL
CO2: 26 MMOL/L (ref 21–32)
COLOR, POC: NORMAL
CREAT SERPL-MCNC: 1.1 MG/DL (ref 0.6–1.2)
CREATININE URINE: 92.9 MG/DL (ref 28–259)
GFR AFRICAN AMERICAN: 59
GFR NON-AFRICAN AMERICAN: 49
GLOBULIN: 2.7 G/DL
GLUCOSE BLD-MCNC: 112 MG/DL (ref 70–99)
GLUCOSE URINE, POC: NORMAL
HDLC SERPL-MCNC: 63 MG/DL (ref 40–60)
KETONES, POC: NORMAL
LDL CHOLESTEROL CALCULATED: 65 MG/DL
LEUKOCYTE EST, POC: NORMAL
MICROALBUMIN UR-MCNC: <1.2 MG/DL
MICROALBUMIN/CREAT UR-RTO: NORMAL MG/G (ref 0–30)
NITRITE, POC: NORMAL
PH, POC: NORMAL
POTASSIUM SERPL-SCNC: 4 MMOL/L (ref 3.5–5.1)
PROTEIN, POC: NORMAL
REASON FOR REJECTION: NORMAL
REJECTED TEST: NORMAL
SODIUM BLD-SCNC: 145 MMOL/L (ref 136–145)
SPECIFIC GRAVITY, POC: NORMAL
TOTAL PROTEIN: 7.3 G/DL (ref 6.4–8.2)
TRIGL SERPL-MCNC: 122 MG/DL (ref 0–150)
TSH SERPL DL<=0.05 MIU/L-ACNC: 2.76 UIU/ML (ref 0.27–4.2)
URIC ACID, SERUM: 4.8 MG/DL (ref 2.6–6)
UROBILINOGEN, POC: NORMAL
VLDLC SERPL CALC-MCNC: 24 MG/DL

## 2018-10-05 PROCEDURE — G8482 FLU IMMUNIZE ORDER/ADMIN: HCPCS | Performed by: INTERNAL MEDICINE

## 2018-10-05 PROCEDURE — G0008 ADMIN INFLUENZA VIRUS VAC: HCPCS | Performed by: INTERNAL MEDICINE

## 2018-10-05 PROCEDURE — G0439 PPPS, SUBSEQ VISIT: HCPCS | Performed by: INTERNAL MEDICINE

## 2018-10-05 PROCEDURE — 81002 URINALYSIS NONAUTO W/O SCOPE: CPT | Performed by: INTERNAL MEDICINE

## 2018-10-05 PROCEDURE — 3044F HG A1C LEVEL LT 7.0%: CPT | Performed by: INTERNAL MEDICINE

## 2018-10-05 PROCEDURE — 4040F PNEUMOC VAC/ADMIN/RCVD: CPT | Performed by: INTERNAL MEDICINE

## 2018-10-05 PROCEDURE — 90662 IIV NO PRSV INCREASED AG IM: CPT | Performed by: INTERNAL MEDICINE

## 2018-10-05 RX ORDER — ACETAMINOPHEN 325 MG/1
325 TABLET ORAL PRN
COMMUNITY

## 2018-10-05 ASSESSMENT — PATIENT HEALTH QUESTIONNAIRE - PHQ9
SUM OF ALL RESPONSES TO PHQ QUESTIONS 1-9: 0
SUM OF ALL RESPONSES TO PHQ QUESTIONS 1-9: 0

## 2018-10-05 ASSESSMENT — ANXIETY QUESTIONNAIRES: GAD7 TOTAL SCORE: 0

## 2018-10-05 ASSESSMENT — LIFESTYLE VARIABLES: HOW OFTEN DO YOU HAVE A DRINK CONTAINING ALCOHOL: 0

## 2018-10-05 NOTE — PATIENT INSTRUCTIONS
[employer] [employer]. Call [phone] or search [employer] online. · You need 3816-3026 mg of calcium and 4346-9292 IU of vitamin D per day. It is possible to meet your calcium requirement with diet alone, but a vitamin D supplement is usually necessary to meet this goal.  · When exposed to the sun, use a sunscreen that protects against both UVA and UVB radiation with an SPF of 30 or greater. Reapply every 2 to 3 hours or after sweating, drying off with a towel, or swimming. · Always wear a seat belt when traveling in a car. Always wear a helmet when riding a bicycle or motorcycle. Keep Your Memory Marija Shaffer       Many factors can affect your ability to remembera hectic lifestyle, aging, stress, chronic disease, and certain medicines. But, there are steps you can take to sharpen your mind and help preserve your memory. Challenge Your Brain   Regularly challenging your mind may help keeps it in top shape. Good mental exercises include:   Crossword puzzlesUse a dictionary if you need it; you will learn more that way. Brainteasers Try some! Crafts, such as wood working and sewing   Hobbies, such as gardening and building model airplanes   SocializingVisit old friends or join groups to meet new ones. Reading   Learning a new language   Taking a class, whether it be art history or monae chi   TravelingExperience the food, history, and culture of your destination   Learning to use a computer   Going to museums, the theater, or thought-provoking movies   Changing things in your daily life, such as reversing your pattern in the grocery store or brushing your teeth using your nondominant hand   Use Memory Aids   There is no need to remember every detail on your own. These memory aids can help:   Calendars and day planners   Electronic organizers to store all sorts of helpful informationThese devices can \"beep\" to remind you of appointments.    A book of days to record birthdays, anniversaries, area around your bed is clear of potential obstacles. Be careful with electric blankets and never go to sleep with a heating pad, which can cause serious burns even if on a low setting. Use fire-resistant mattress covers and pillows, and NEVER smoke in bed. Keep a phone next to the bed that is programmed to dial 911 at the push of a button. If you have a chronic condition, you may want to sign on with an automatic call-in service. Typically the system includes a small pendant that connects directly to an emergency medical voice-response system. You should also make arrangements to stay in contact with someonefriend, neighbor, family memberon a regular schedule. Fire Prevention   According to the [employer]. (Smoke Alarms for Every) [address] [place] [address], senior citizens are one of the two highest risk groups for death and serious injuries due to residential fires. When cooking, wear short-sleeved items, never a bulky long-sleeved robe. The [facility]'s Safety Checklist for Older Consumers emphasizes the importance of checking basements, garages, workshops and storage areas for fire hazards, such as volatile liquids, piles of old rags or clothing and overloaded circuits. Never smoke in bed or when lying down on a couch or recliner chair. Small portable electric or kerosene heaters are responsible for many home fires and should be used cautiously if at all. If you do use one, be sure to keep them away from flammable materials. In case of fire, make sure you have a pre-established emergency exit plan. Have a professional check your fireplace and other fuel-burning appliances yearly. Helping Hands   Baby boomers entering the negrete years will continue to see the development of new products to help older adults live safely and independently in spite of age-related changes.   Making Life More Livable  , by David Fox, lists over 1,000 products for \"living well in the mature years,\" such as

## 2018-10-08 ENCOUNTER — TELEPHONE (OUTPATIENT)
Dept: INTERNAL MEDICINE CLINIC | Age: 73
End: 2018-10-08

## 2018-10-09 ENCOUNTER — TELEPHONE (OUTPATIENT)
Dept: INTERNAL MEDICINE CLINIC | Age: 73
End: 2018-10-09

## 2018-10-09 DIAGNOSIS — E11.9 TYPE 2 DIABETES MELLITUS WITHOUT COMPLICATION, WITHOUT LONG-TERM CURRENT USE OF INSULIN (HCC): ICD-10-CM

## 2018-10-09 DIAGNOSIS — E11.9 TYPE 2 DIABETES MELLITUS WITHOUT COMPLICATION, WITHOUT LONG-TERM CURRENT USE OF INSULIN (HCC): Primary | ICD-10-CM

## 2018-10-09 LAB
BASOPHILS ABSOLUTE: 0.1 K/UL (ref 0–0.2)
BASOPHILS RELATIVE PERCENT: 0.6 %
EOSINOPHILS ABSOLUTE: 0.1 K/UL (ref 0–0.6)
EOSINOPHILS RELATIVE PERCENT: 1.7 %
HCT VFR BLD CALC: 42.4 % (ref 36–48)
HEMOGLOBIN: 13.8 G/DL (ref 12–16)
LYMPHOCYTES ABSOLUTE: 1.7 K/UL (ref 1–5.1)
LYMPHOCYTES RELATIVE PERCENT: 21.3 %
MCH RBC QN AUTO: 29.1 PG (ref 26–34)
MCHC RBC AUTO-ENTMCNC: 32.6 G/DL (ref 31–36)
MCV RBC AUTO: 89.2 FL (ref 80–100)
MONOCYTES ABSOLUTE: 0.7 K/UL (ref 0–1.3)
MONOCYTES RELATIVE PERCENT: 8.5 %
NEUTROPHILS ABSOLUTE: 5.4 K/UL (ref 1.7–7.7)
NEUTROPHILS RELATIVE PERCENT: 67.9 %
PDW BLD-RTO: 15.4 % (ref 12.4–15.4)
PLATELET # BLD: 236 K/UL (ref 135–450)
PMV BLD AUTO: 9.3 FL (ref 5–10.5)
RBC # BLD: 4.75 M/UL (ref 4–5.2)
WBC # BLD: 8 K/UL (ref 4–11)

## 2018-10-10 LAB
ESTIMATED AVERAGE GLUCOSE: 139.9 MG/DL
HBA1C MFR BLD: 6.5 %

## 2018-10-22 RX ORDER — GLIMEPIRIDE 4 MG/1
TABLET ORAL
Qty: 90 TABLET | Refills: 0 | Status: SHIPPED | OUTPATIENT
Start: 2018-10-22 | End: 2018-12-17 | Stop reason: SDUPTHER

## 2018-10-22 RX ORDER — DILTIAZEM HYDROCHLORIDE 360 MG/1
CAPSULE, EXTENDED RELEASE ORAL
Qty: 90 CAPSULE | Refills: 0 | Status: SHIPPED | OUTPATIENT
Start: 2018-10-22 | End: 2018-12-17 | Stop reason: SDUPTHER

## 2018-10-22 RX ORDER — PIOGLITAZONEHYDROCHLORIDE 30 MG/1
TABLET ORAL
Qty: 90 TABLET | Refills: 0 | Status: SHIPPED | OUTPATIENT
Start: 2018-10-22 | End: 2018-12-17 | Stop reason: SDUPTHER

## 2018-10-22 RX ORDER — LOSARTAN POTASSIUM AND HYDROCHLOROTHIAZIDE 25; 100 MG/1; MG/1
1 TABLET ORAL DAILY
Qty: 90 TABLET | Refills: 0 | Status: SHIPPED | OUTPATIENT
Start: 2018-10-22 | End: 2018-12-17 | Stop reason: SDUPTHER

## 2018-10-22 RX ORDER — PRAVASTATIN SODIUM 40 MG
TABLET ORAL
Qty: 90 TABLET | Refills: 0 | Status: SHIPPED | OUTPATIENT
Start: 2018-10-22 | End: 2018-12-17 | Stop reason: SDUPTHER

## 2018-12-17 RX ORDER — GLIMEPIRIDE 4 MG/1
TABLET ORAL
Qty: 90 TABLET | Refills: 0 | Status: SHIPPED | OUTPATIENT
Start: 2018-12-17 | End: 2019-05-01 | Stop reason: SDUPTHER

## 2018-12-17 RX ORDER — PIOGLITAZONEHYDROCHLORIDE 30 MG/1
TABLET ORAL
Qty: 90 TABLET | Refills: 0 | Status: SHIPPED | OUTPATIENT
Start: 2018-12-17 | End: 2019-05-01 | Stop reason: SDUPTHER

## 2018-12-17 RX ORDER — PRAVASTATIN SODIUM 40 MG
TABLET ORAL
Qty: 90 TABLET | Refills: 0 | Status: SHIPPED | OUTPATIENT
Start: 2018-12-17 | End: 2019-05-01 | Stop reason: SDUPTHER

## 2018-12-17 RX ORDER — DILTIAZEM HYDROCHLORIDE 360 MG/1
CAPSULE, EXTENDED RELEASE ORAL
Qty: 90 CAPSULE | Refills: 0 | Status: SHIPPED | OUTPATIENT
Start: 2018-12-17 | End: 2019-04-22 | Stop reason: SDUPTHER

## 2018-12-17 RX ORDER — LOSARTAN POTASSIUM AND HYDROCHLOROTHIAZIDE 25; 100 MG/1; MG/1
TABLET ORAL
Qty: 90 TABLET | Refills: 0 | Status: SHIPPED | OUTPATIENT
Start: 2018-12-17 | End: 2019-04-22 | Stop reason: SDUPTHER

## 2019-01-07 ENCOUNTER — HOSPITAL ENCOUNTER (OUTPATIENT)
Age: 74
Discharge: HOME | End: 2019-01-07
Payer: MEDICARE

## 2019-01-07 ENCOUNTER — OFFICE VISIT (OUTPATIENT)
Dept: INTERNAL MEDICINE CLINIC | Age: 74
End: 2019-01-07

## 2019-01-07 VITALS
HEART RATE: 72 BPM | HEIGHT: 61 IN | WEIGHT: 224 LBS | DIASTOLIC BLOOD PRESSURE: 80 MMHG | SYSTOLIC BLOOD PRESSURE: 152 MMHG | RESPIRATION RATE: 12 BRPM | BODY MASS INDEX: 42.29 KG/M2 | TEMPERATURE: 97.4 F

## 2019-01-07 DIAGNOSIS — R09.81 CONGESTION OF NASAL SINUS: ICD-10-CM

## 2019-01-07 DIAGNOSIS — R05.9 COUGH: Primary | ICD-10-CM

## 2019-01-07 PROCEDURE — 99213 OFFICE O/P EST LOW 20 MIN: CPT | Performed by: PHYSICIAN ASSISTANT

## 2019-01-07 PROCEDURE — G8417 CALC BMI ABV UP PARAM F/U: HCPCS | Performed by: PHYSICIAN ASSISTANT

## 2019-01-07 PROCEDURE — 87081 CULTURE SCREEN ONLY: CPT

## 2019-01-07 PROCEDURE — 87798 DETECT AGENT NOS DNA AMP: CPT

## 2019-01-07 PROCEDURE — 1090F PRES/ABSN URINE INCON ASSESS: CPT | Performed by: PHYSICIAN ASSISTANT

## 2019-01-07 PROCEDURE — G8427 DOCREV CUR MEDS BY ELIG CLIN: HCPCS | Performed by: PHYSICIAN ASSISTANT

## 2019-01-07 PROCEDURE — 1101F PT FALLS ASSESS-DOCD LE1/YR: CPT | Performed by: PHYSICIAN ASSISTANT

## 2019-01-07 PROCEDURE — G8400 PT W/DXA NO RESULTS DOC: HCPCS | Performed by: PHYSICIAN ASSISTANT

## 2019-01-07 PROCEDURE — 4040F PNEUMOC VAC/ADMIN/RCVD: CPT | Performed by: PHYSICIAN ASSISTANT

## 2019-01-07 PROCEDURE — 1036F TOBACCO NON-USER: CPT | Performed by: PHYSICIAN ASSISTANT

## 2019-01-07 PROCEDURE — 3017F COLORECTAL CA SCREEN DOC REV: CPT | Performed by: PHYSICIAN ASSISTANT

## 2019-01-07 PROCEDURE — G8482 FLU IMMUNIZE ORDER/ADMIN: HCPCS | Performed by: PHYSICIAN ASSISTANT

## 2019-01-07 PROCEDURE — 1123F ACP DISCUSS/DSCN MKR DOCD: CPT | Performed by: PHYSICIAN ASSISTANT

## 2019-01-07 RX ORDER — AZITHROMYCIN 250 MG/1
250 TABLET, FILM COATED ORAL SEE ADMIN INSTRUCTIONS
Qty: 6 TABLET | Refills: 0 | Status: SHIPPED | OUTPATIENT
Start: 2019-01-07 | End: 2019-01-12

## 2019-01-07 ASSESSMENT — ENCOUNTER SYMPTOMS
SHORTNESS OF BREATH: 0
DIARRHEA: 0
RHINORRHEA: 1
EYE DISCHARGE: 0
ABDOMINAL PAIN: 0
SINUS PRESSURE: 0
CHEST TIGHTNESS: 0
ABDOMINAL DISTENTION: 0
VOMITING: 0
SINUS PAIN: 0
SORE THROAT: 0
COUGH: 1
CONSTIPATION: 0
EYE PAIN: 0
EYE ITCHING: 0
WHEEZING: 0
NAUSEA: 0
BACK PAIN: 0
BLOOD IN STOOL: 0

## 2019-01-10 LAB
B. PERTUSSIS/PARAPERTUSSIS SOURCE: NORMAL
BORDETELLA PARAPERTUSSIS PCR: NOT DETECTED
BORDETELLA PERTUSSIS PCR: NOT DETECTED

## 2019-01-15 ENCOUNTER — TELEPHONE (OUTPATIENT)
Dept: INTERNAL MEDICINE CLINIC | Age: 74
End: 2019-01-15

## 2019-01-18 LAB
FINAL REPORT: NORMAL
PRELIMINARY: NORMAL

## 2019-02-04 ENCOUNTER — OFFICE VISIT (OUTPATIENT)
Dept: INTERNAL MEDICINE CLINIC | Age: 74
End: 2019-02-04

## 2019-02-04 ENCOUNTER — HOSPITAL ENCOUNTER (OUTPATIENT)
Age: 74
Discharge: HOME | End: 2019-02-04
Payer: MEDICARE

## 2019-02-04 VITALS
HEIGHT: 61 IN | RESPIRATION RATE: 14 BRPM | DIASTOLIC BLOOD PRESSURE: 60 MMHG | HEART RATE: 70 BPM | WEIGHT: 228 LBS | BODY MASS INDEX: 43.05 KG/M2 | SYSTOLIC BLOOD PRESSURE: 138 MMHG

## 2019-02-04 DIAGNOSIS — E11.9 TYPE 2 DIABETES MELLITUS WITHOUT COMPLICATION, WITHOUT LONG-TERM CURRENT USE OF INSULIN (HCC): Primary | ICD-10-CM

## 2019-02-04 DIAGNOSIS — I10 ESSENTIAL HYPERTENSION: ICD-10-CM

## 2019-02-04 DIAGNOSIS — E11.9 TYPE 2 DIABETES MELLITUS WITHOUT COMPLICATION, WITHOUT LONG-TERM CURRENT USE OF INSULIN (HCC): ICD-10-CM

## 2019-02-04 DIAGNOSIS — E11.69 HYPERLIPIDEMIA ASSOCIATED WITH TYPE 2 DIABETES MELLITUS (HCC): ICD-10-CM

## 2019-02-04 DIAGNOSIS — E66.01 MORBID OBESITY WITH BMI OF 40.0-44.9, ADULT (HCC): ICD-10-CM

## 2019-02-04 DIAGNOSIS — E78.5 HYPERLIPIDEMIA ASSOCIATED WITH TYPE 2 DIABETES MELLITUS (HCC): ICD-10-CM

## 2019-02-04 DIAGNOSIS — F20.0 PARANOID TYPE SCHIZOPHRENIA (HCC): ICD-10-CM

## 2019-02-04 PROCEDURE — 3017F COLORECTAL CA SCREEN DOC REV: CPT | Performed by: INTERNAL MEDICINE

## 2019-02-04 PROCEDURE — 1123F ACP DISCUSS/DSCN MKR DOCD: CPT | Performed by: INTERNAL MEDICINE

## 2019-02-04 PROCEDURE — G8427 DOCREV CUR MEDS BY ELIG CLIN: HCPCS | Performed by: INTERNAL MEDICINE

## 2019-02-04 PROCEDURE — 36415 COLL VENOUS BLD VENIPUNCTURE: CPT

## 2019-02-04 PROCEDURE — 2022F DILAT RTA XM EVC RTNOPTHY: CPT | Performed by: INTERNAL MEDICINE

## 2019-02-04 PROCEDURE — 3046F HEMOGLOBIN A1C LEVEL >9.0%: CPT | Performed by: INTERNAL MEDICINE

## 2019-02-04 PROCEDURE — 1101F PT FALLS ASSESS-DOCD LE1/YR: CPT | Performed by: INTERNAL MEDICINE

## 2019-02-04 PROCEDURE — 1036F TOBACCO NON-USER: CPT | Performed by: INTERNAL MEDICINE

## 2019-02-04 PROCEDURE — G8417 CALC BMI ABV UP PARAM F/U: HCPCS | Performed by: INTERNAL MEDICINE

## 2019-02-04 PROCEDURE — 83036 HEMOGLOBIN GLYCOSYLATED A1C: CPT

## 2019-02-04 PROCEDURE — 99214 OFFICE O/P EST MOD 30 MIN: CPT | Performed by: INTERNAL MEDICINE

## 2019-02-04 PROCEDURE — 1090F PRES/ABSN URINE INCON ASSESS: CPT | Performed by: INTERNAL MEDICINE

## 2019-02-04 PROCEDURE — 4040F PNEUMOC VAC/ADMIN/RCVD: CPT | Performed by: INTERNAL MEDICINE

## 2019-02-04 PROCEDURE — G8400 PT W/DXA NO RESULTS DOC: HCPCS | Performed by: INTERNAL MEDICINE

## 2019-02-04 PROCEDURE — G8482 FLU IMMUNIZE ORDER/ADMIN: HCPCS | Performed by: INTERNAL MEDICINE

## 2019-02-04 ASSESSMENT — ENCOUNTER SYMPTOMS
NAUSEA: 0
ABDOMINAL PAIN: 0
RHINORRHEA: 0
SHORTNESS OF BREATH: 1
VOMITING: 0
WHEEZING: 0

## 2019-02-05 LAB
ESTIMATED AVERAGE GLUCOSE: 137 MG/DL
HBA1C MFR BLD: 6.4 %

## 2019-02-06 DIAGNOSIS — E11.9 DIABETES MELLITUS WITH NO COMPLICATION (HCC): Primary | ICD-10-CM

## 2019-02-06 RX ORDER — LANCETS
EACH MISCELLANEOUS
Qty: 100 EACH | Refills: 5 | OUTPATIENT
Start: 2019-02-06

## 2019-02-06 RX ORDER — LANCETS
EACH MISCELLANEOUS
Qty: 100 EACH | Refills: 5 | Status: SHIPPED | OUTPATIENT
Start: 2019-02-06 | End: 2019-02-19 | Stop reason: SDUPTHER

## 2019-02-06 RX ORDER — BLOOD SUGAR DIAGNOSTIC
STRIP MISCELLANEOUS
Qty: 100 STRIP | Refills: 11 | Status: SHIPPED | OUTPATIENT
Start: 2019-02-06 | End: 2019-02-19 | Stop reason: SDUPTHER

## 2019-02-19 RX ORDER — LANCETS
EACH MISCELLANEOUS
Qty: 100 EACH | Refills: 11 | Status: SHIPPED | OUTPATIENT
Start: 2019-02-19 | End: 2020-03-11

## 2019-04-22 RX ORDER — LOSARTAN POTASSIUM AND HYDROCHLOROTHIAZIDE 25; 100 MG/1; MG/1
TABLET ORAL
Qty: 90 TABLET | Refills: 0 | Status: SHIPPED | OUTPATIENT
Start: 2019-04-22 | End: 2019-07-17 | Stop reason: SDUPTHER

## 2019-04-22 RX ORDER — DILTIAZEM HYDROCHLORIDE 360 MG/1
CAPSULE, EXTENDED RELEASE ORAL
Qty: 90 CAPSULE | Refills: 0 | Status: SHIPPED | OUTPATIENT
Start: 2019-04-22 | End: 2019-07-17 | Stop reason: SDUPTHER

## 2019-05-01 RX ORDER — PIOGLITAZONEHYDROCHLORIDE 30 MG/1
TABLET ORAL
Qty: 90 TABLET | Refills: 0 | Status: SHIPPED | OUTPATIENT
Start: 2019-05-01 | End: 2019-07-29 | Stop reason: SDUPTHER

## 2019-05-01 RX ORDER — GLIMEPIRIDE 4 MG/1
TABLET ORAL
Qty: 90 TABLET | Refills: 0 | Status: SHIPPED | OUTPATIENT
Start: 2019-05-01 | End: 2019-07-29 | Stop reason: SDUPTHER

## 2019-05-01 RX ORDER — PRAVASTATIN SODIUM 40 MG
TABLET ORAL
Qty: 90 TABLET | Refills: 0 | Status: SHIPPED | OUTPATIENT
Start: 2019-05-01 | End: 2019-07-29 | Stop reason: SDUPTHER

## 2019-05-22 ENCOUNTER — OFFICE VISIT (OUTPATIENT)
Dept: INTERNAL MEDICINE CLINIC | Age: 74
End: 2019-05-22

## 2019-05-22 VITALS
WEIGHT: 228 LBS | HEART RATE: 70 BPM | DIASTOLIC BLOOD PRESSURE: 80 MMHG | HEIGHT: 61 IN | RESPIRATION RATE: 14 BRPM | BODY MASS INDEX: 43.05 KG/M2 | SYSTOLIC BLOOD PRESSURE: 132 MMHG

## 2019-05-22 DIAGNOSIS — E78.5 HYPERLIPIDEMIA ASSOCIATED WITH TYPE 2 DIABETES MELLITUS (HCC): ICD-10-CM

## 2019-05-22 DIAGNOSIS — E11.69 HYPERLIPIDEMIA ASSOCIATED WITH TYPE 2 DIABETES MELLITUS (HCC): ICD-10-CM

## 2019-05-22 DIAGNOSIS — E11.9 TYPE 2 DIABETES MELLITUS WITHOUT COMPLICATION, WITHOUT LONG-TERM CURRENT USE OF INSULIN (HCC): ICD-10-CM

## 2019-05-22 DIAGNOSIS — F20.0 PARANOID TYPE SCHIZOPHRENIA (HCC): ICD-10-CM

## 2019-05-22 DIAGNOSIS — E11.9 TYPE 2 DIABETES MELLITUS WITHOUT COMPLICATION, WITHOUT LONG-TERM CURRENT USE OF INSULIN (HCC): Primary | ICD-10-CM

## 2019-05-22 DIAGNOSIS — I10 ESSENTIAL HYPERTENSION: ICD-10-CM

## 2019-05-22 LAB
A/G RATIO: 1.6 (ref 1.1–2.2)
ALBUMIN SERPL-MCNC: 4.6 G/DL (ref 3.4–5)
ALP BLD-CCNC: 101 U/L (ref 40–129)
ALT SERPL-CCNC: 14 U/L (ref 10–40)
ANION GAP SERPL CALCULATED.3IONS-SCNC: 15 MMOL/L (ref 3–16)
AST SERPL-CCNC: 19 U/L (ref 15–37)
BASOPHILS ABSOLUTE: 0.1 K/UL (ref 0–0.2)
BASOPHILS RELATIVE PERCENT: 1 %
BILIRUB SERPL-MCNC: 0.3 MG/DL (ref 0–1)
BUN BLDV-MCNC: 16 MG/DL (ref 7–20)
CALCIUM SERPL-MCNC: 10.5 MG/DL (ref 8.3–10.6)
CHLORIDE BLD-SCNC: 101 MMOL/L (ref 99–110)
CO2: 28 MMOL/L (ref 21–32)
CREAT SERPL-MCNC: 1.1 MG/DL (ref 0.6–1.2)
EOSINOPHILS ABSOLUTE: 0.2 K/UL (ref 0–0.6)
EOSINOPHILS RELATIVE PERCENT: 3.1 %
GFR AFRICAN AMERICAN: 59
GFR NON-AFRICAN AMERICAN: 49
GLOBULIN: 2.8 G/DL
GLUCOSE BLD-MCNC: 101 MG/DL (ref 70–99)
HCT VFR BLD CALC: 42.5 % (ref 36–48)
HEMOGLOBIN: 14 G/DL (ref 12–16)
LYMPHOCYTES ABSOLUTE: 1.6 K/UL (ref 1–5.1)
LYMPHOCYTES RELATIVE PERCENT: 22.7 %
MCH RBC QN AUTO: 29.4 PG (ref 26–34)
MCHC RBC AUTO-ENTMCNC: 32.9 G/DL (ref 31–36)
MCV RBC AUTO: 89.3 FL (ref 80–100)
MONOCYTES ABSOLUTE: 0.6 K/UL (ref 0–1.3)
MONOCYTES RELATIVE PERCENT: 9.3 %
NEUTROPHILS ABSOLUTE: 4.4 K/UL (ref 1.7–7.7)
NEUTROPHILS RELATIVE PERCENT: 63.9 %
PDW BLD-RTO: 14.9 % (ref 12.4–15.4)
PLATELET # BLD: 248 K/UL (ref 135–450)
PMV BLD AUTO: 9.2 FL (ref 5–10.5)
POTASSIUM SERPL-SCNC: 4.3 MMOL/L (ref 3.5–5.1)
RBC # BLD: 4.75 M/UL (ref 4–5.2)
SODIUM BLD-SCNC: 144 MMOL/L (ref 136–145)
TOTAL PROTEIN: 7.4 G/DL (ref 6.4–8.2)
WBC # BLD: 6.8 K/UL (ref 4–11)

## 2019-05-22 PROCEDURE — 3044F HG A1C LEVEL LT 7.0%: CPT | Performed by: INTERNAL MEDICINE

## 2019-05-22 PROCEDURE — G8417 CALC BMI ABV UP PARAM F/U: HCPCS | Performed by: INTERNAL MEDICINE

## 2019-05-22 PROCEDURE — 1036F TOBACCO NON-USER: CPT | Performed by: INTERNAL MEDICINE

## 2019-05-22 PROCEDURE — 3017F COLORECTAL CA SCREEN DOC REV: CPT | Performed by: INTERNAL MEDICINE

## 2019-05-22 PROCEDURE — 1090F PRES/ABSN URINE INCON ASSESS: CPT | Performed by: INTERNAL MEDICINE

## 2019-05-22 PROCEDURE — 2022F DILAT RTA XM EVC RTNOPTHY: CPT | Performed by: INTERNAL MEDICINE

## 2019-05-22 PROCEDURE — 99214 OFFICE O/P EST MOD 30 MIN: CPT | Performed by: INTERNAL MEDICINE

## 2019-05-22 PROCEDURE — 4040F PNEUMOC VAC/ADMIN/RCVD: CPT | Performed by: INTERNAL MEDICINE

## 2019-05-22 PROCEDURE — G8400 PT W/DXA NO RESULTS DOC: HCPCS | Performed by: INTERNAL MEDICINE

## 2019-05-22 PROCEDURE — 1123F ACP DISCUSS/DSCN MKR DOCD: CPT | Performed by: INTERNAL MEDICINE

## 2019-05-22 PROCEDURE — G8427 DOCREV CUR MEDS BY ELIG CLIN: HCPCS | Performed by: INTERNAL MEDICINE

## 2019-05-22 ASSESSMENT — ENCOUNTER SYMPTOMS
WHEEZING: 0
NAUSEA: 0
VOMITING: 0
RHINORRHEA: 0
SHORTNESS OF BREATH: 0
ABDOMINAL PAIN: 0

## 2019-05-22 NOTE — PROGRESS NOTES
ACCU-CHEK SOFTCLIX LANCETS MISC TEST TWICE A DAY. DX E11.9 100 each 11    Calcium Carb-Cholecalciferol (CALCIUM PLUS VITAMIN D3) 600-800 MG-UNIT TABS Take by mouth 2 times daily      Wheat Dextrin (BENEFIBER PO) Take by mouth as needed      acetaminophen (TYLENOL) 325 MG tablet Take 325 mg by mouth as needed for Pain      busPIRone (BUSPAR) 10 MG tablet Take 10 mg by mouth 2 times daily      Blood Glucose Monitoring Suppl (ACCU-CHEK NEY PLUS) w/Device KIT Check twice daily. DX: E11.9 1 kit 0    Lancets MISC One every day 100 each 2    aspirin 81 MG EC tablet Take 81 mg by mouth daily.  risperidone (RISPERDAL) 2 MG tablet Take 2 mg by mouth daily. No current facility-administered medications for this visit. There are no changes to past medical history, family history, social history or review of systems(except as noted in the history section) since prior note (all reviewed with patient). /80 (Site: Left Upper Arm, Position: Sitting, Cuff Size: Large Adult)   Pulse 70   Resp 14   Ht 5' 1\" (1.549 m)   Wt 228 lb (103.4 kg)   BMI 43.08 kg/m²     Objective:   Physical Exam   Constitutional: She is oriented to person, place, and time. She appears well-developed and well-nourished. HENT:   Head: Normocephalic. Eyes: Pupils are equal, round, and reactive to light. Conjunctivae and EOM are normal.   Neck: Trachea normal and normal range of motion. Neck supple. No JVD present. Carotid bruit is not present. No thyroid mass and no thyromegaly present. Cardiovascular: Normal rate and regular rhythm. Exam reveals no gallop. Murmur heard. Systolic murmur is present. Pulmonary/Chest: Effort normal and breath sounds normal. No respiratory distress. She has no wheezes. She has no rales. Abdominal: Soft. Bowel sounds are normal. She exhibits no distension and no mass. There is no splenomegaly or hepatomegaly. There is no tenderness.    Musculoskeletal: She exhibits edema (trace). Lymphadenopathy:     She has no cervical adenopathy. Neurological: She is alert and oriented to person, place, and time. She displays tremor. Skin: Skin is warm and dry. No rash noted. Psychiatric: She has a normal mood and affect. Her behavior is normal. Judgment and thought content normal.     ECHO done on 7/14/2015  Conclusions    Summary  Normal left ventricle size, wall thickness and systolic function with an  estimated ejection fraction of 55%. No regional wall motion abnormalities  are seen. Borderline concentric LVH. There is trivial tricuspid regurgitation. Unable to obtain SPAP due to lack of TR envelope. Assessment:       Diagnosis Orders   1. Type 2 diabetes mellitus without complication, without long-term current use of insulin ([facility]     2. Hyperlipidemia associated with type 2 diabetes mellitus ([facility]     3. Paranoid type schizophrenia ([facility]     4. Essential hypertension     5. Osteoarthrosis involving lower leg            Plan:          DM: Well controlled. Check A1C. Hypertension:  At goal. On meds. SBP up some but still under 140. Hyperlipidemia:  At goal.   Schizophrenia: on medication. OA: no changes. Heart murmur:  No issues. Discussed use, benefit, and side effects of prescribed medications. Barriers to medication compliance addressed. All patient questions answered. Pt voiced understanding. Decrease calorie intake. Exercise, weight loss recommended. The current medical regimen is effective;  continue present plan and medications. See orders.

## 2019-05-23 LAB
ESTIMATED AVERAGE GLUCOSE: 148.5 MG/DL
HBA1C MFR BLD: 6.8 %

## 2019-07-18 RX ORDER — LOSARTAN POTASSIUM AND HYDROCHLOROTHIAZIDE 25; 100 MG/1; MG/1
TABLET ORAL
Qty: 90 TABLET | Refills: 0 | Status: SHIPPED | OUTPATIENT
Start: 2019-07-18 | End: 2019-10-15 | Stop reason: SDUPTHER

## 2019-07-18 RX ORDER — DILTIAZEM HYDROCHLORIDE 360 MG/1
CAPSULE, EXTENDED RELEASE ORAL
Qty: 90 CAPSULE | Refills: 0 | Status: SHIPPED | OUTPATIENT
Start: 2019-07-18 | End: 2019-10-15 | Stop reason: SDUPTHER

## 2019-07-29 RX ORDER — PRAVASTATIN SODIUM 40 MG
TABLET ORAL
Qty: 90 TABLET | Refills: 0 | Status: SHIPPED | OUTPATIENT
Start: 2019-07-29 | End: 2019-10-25 | Stop reason: SDUPTHER

## 2019-07-29 RX ORDER — GLIMEPIRIDE 4 MG/1
TABLET ORAL
Qty: 90 TABLET | Refills: 0 | Status: SHIPPED | OUTPATIENT
Start: 2019-07-29 | End: 2019-10-25 | Stop reason: SDUPTHER

## 2019-07-29 RX ORDER — PIOGLITAZONEHYDROCHLORIDE 30 MG/1
TABLET ORAL
Qty: 90 TABLET | Refills: 0 | Status: SHIPPED | OUTPATIENT
Start: 2019-07-29 | End: 2019-10-25 | Stop reason: SDUPTHER

## 2019-09-03 ENCOUNTER — OFFICE VISIT (OUTPATIENT)
Dept: INTERNAL MEDICINE CLINIC | Age: 74
End: 2019-09-03

## 2019-09-03 VITALS
SYSTOLIC BLOOD PRESSURE: 130 MMHG | HEIGHT: 61 IN | RESPIRATION RATE: 14 BRPM | DIASTOLIC BLOOD PRESSURE: 80 MMHG | WEIGHT: 227 LBS | HEART RATE: 70 BPM | BODY MASS INDEX: 42.86 KG/M2

## 2019-09-03 DIAGNOSIS — E11.9 TYPE 2 DIABETES MELLITUS WITHOUT COMPLICATION, WITHOUT LONG-TERM CURRENT USE OF INSULIN (HCC): ICD-10-CM

## 2019-09-03 DIAGNOSIS — N18.30 CHRONIC KIDNEY DISEASE, STAGE III (MODERATE) (HCC): ICD-10-CM

## 2019-09-03 DIAGNOSIS — E11.9 TYPE 2 DIABETES MELLITUS WITHOUT COMPLICATION, WITHOUT LONG-TERM CURRENT USE OF INSULIN (HCC): Primary | ICD-10-CM

## 2019-09-03 DIAGNOSIS — E66.01 MORBID OBESITY WITH BMI OF 40.0-44.9, ADULT (HCC): ICD-10-CM

## 2019-09-03 DIAGNOSIS — F20.0 PARANOID TYPE SCHIZOPHRENIA (HCC): ICD-10-CM

## 2019-09-03 DIAGNOSIS — I10 ESSENTIAL HYPERTENSION: ICD-10-CM

## 2019-09-03 DIAGNOSIS — E78.5 HYPERLIPIDEMIA ASSOCIATED WITH TYPE 2 DIABETES MELLITUS (HCC): ICD-10-CM

## 2019-09-03 DIAGNOSIS — E11.69 HYPERLIPIDEMIA ASSOCIATED WITH TYPE 2 DIABETES MELLITUS (HCC): ICD-10-CM

## 2019-09-03 LAB
BILIRUBIN, POC: NORMAL
BLOOD URINE, POC: NORMAL
CHOLESTEROL, TOTAL: 146 MG/DL (ref 0–199)
CLARITY, POC: NORMAL
COLOR, POC: NORMAL
CREATININE URINE: 98.1 MG/DL (ref 28–259)
GLUCOSE URINE, POC: NORMAL
HDLC SERPL-MCNC: 66 MG/DL (ref 40–60)
KETONES, POC: NORMAL
LDL CHOLESTEROL CALCULATED: 59 MG/DL
LEUKOCYTE EST, POC: NORMAL
MICROALBUMIN UR-MCNC: <1.2 MG/DL
MICROALBUMIN/CREAT UR-RTO: NORMAL MG/G (ref 0–30)
NITRITE, POC: NORMAL
PH, POC: NORMAL
PROTEIN, POC: NORMAL
SPECIFIC GRAVITY, POC: NORMAL
TRIGL SERPL-MCNC: 107 MG/DL (ref 0–150)
UROBILINOGEN, POC: NORMAL
VLDLC SERPL CALC-MCNC: 21 MG/DL

## 2019-09-03 PROCEDURE — 1123F ACP DISCUSS/DSCN MKR DOCD: CPT | Performed by: INTERNAL MEDICINE

## 2019-09-03 PROCEDURE — 1090F PRES/ABSN URINE INCON ASSESS: CPT | Performed by: INTERNAL MEDICINE

## 2019-09-03 PROCEDURE — 1036F TOBACCO NON-USER: CPT | Performed by: INTERNAL MEDICINE

## 2019-09-03 PROCEDURE — 3044F HG A1C LEVEL LT 7.0%: CPT | Performed by: INTERNAL MEDICINE

## 2019-09-03 PROCEDURE — G8417 CALC BMI ABV UP PARAM F/U: HCPCS | Performed by: INTERNAL MEDICINE

## 2019-09-03 PROCEDURE — 99214 OFFICE O/P EST MOD 30 MIN: CPT | Performed by: INTERNAL MEDICINE

## 2019-09-03 PROCEDURE — 81002 URINALYSIS NONAUTO W/O SCOPE: CPT | Performed by: INTERNAL MEDICINE

## 2019-09-03 PROCEDURE — 2022F DILAT RTA XM EVC RTNOPTHY: CPT | Performed by: INTERNAL MEDICINE

## 2019-09-03 PROCEDURE — G8427 DOCREV CUR MEDS BY ELIG CLIN: HCPCS | Performed by: INTERNAL MEDICINE

## 2019-09-03 PROCEDURE — 4040F PNEUMOC VAC/ADMIN/RCVD: CPT | Performed by: INTERNAL MEDICINE

## 2019-09-03 PROCEDURE — G8400 PT W/DXA NO RESULTS DOC: HCPCS | Performed by: INTERNAL MEDICINE

## 2019-09-03 PROCEDURE — 3017F COLORECTAL CA SCREEN DOC REV: CPT | Performed by: INTERNAL MEDICINE

## 2019-09-03 ASSESSMENT — ENCOUNTER SYMPTOMS
ABDOMINAL PAIN: 0
NAUSEA: 0
WHEEZING: 0
RHINORRHEA: 0
VOMITING: 0
SHORTNESS OF BREATH: 0

## 2019-09-03 ASSESSMENT — PATIENT HEALTH QUESTIONNAIRE - PHQ9
SUM OF ALL RESPONSES TO PHQ QUESTIONS 1-9: 0
2. FEELING DOWN, DEPRESSED OR HOPELESS: 0
1. LITTLE INTEREST OR PLEASURE IN DOING THINGS: 0
SUM OF ALL RESPONSES TO PHQ9 QUESTIONS 1 & 2: 0
SUM OF ALL RESPONSES TO PHQ QUESTIONS 1-9: 0

## 2019-09-04 LAB
ESTIMATED AVERAGE GLUCOSE: 131.2 MG/DL
HBA1C MFR BLD: 6.2 %

## 2019-10-15 RX ORDER — DILTIAZEM HYDROCHLORIDE 360 MG/1
CAPSULE, EXTENDED RELEASE ORAL
Qty: 90 CAPSULE | Refills: 0 | Status: SHIPPED | OUTPATIENT
Start: 2019-10-15 | End: 2020-01-14

## 2019-10-15 RX ORDER — LOSARTAN POTASSIUM AND HYDROCHLOROTHIAZIDE 25; 100 MG/1; MG/1
TABLET ORAL
Qty: 90 TABLET | Refills: 0 | Status: SHIPPED | OUTPATIENT
Start: 2019-10-15 | End: 2020-01-14

## 2019-10-25 RX ORDER — PIOGLITAZONEHYDROCHLORIDE 30 MG/1
TABLET ORAL
Qty: 90 TABLET | Refills: 0 | Status: SHIPPED | OUTPATIENT
Start: 2019-10-25 | End: 2020-01-21

## 2019-10-25 RX ORDER — PRAVASTATIN SODIUM 40 MG
TABLET ORAL
Qty: 90 TABLET | Refills: 0 | Status: SHIPPED | OUTPATIENT
Start: 2019-10-25 | End: 2020-01-21

## 2019-10-25 RX ORDER — GLIMEPIRIDE 4 MG/1
TABLET ORAL
Qty: 90 TABLET | Refills: 0 | Status: SHIPPED | OUTPATIENT
Start: 2019-10-25 | End: 2020-01-21

## 2019-12-10 ENCOUNTER — OFFICE VISIT (OUTPATIENT)
Dept: INTERNAL MEDICINE CLINIC | Age: 74
End: 2019-12-10

## 2019-12-10 VITALS — HEIGHT: 61 IN | BODY MASS INDEX: 42.29 KG/M2 | WEIGHT: 224 LBS

## 2019-12-10 DIAGNOSIS — N18.30 CHRONIC KIDNEY DISEASE, STAGE III (MODERATE) (HCC): ICD-10-CM

## 2019-12-10 DIAGNOSIS — Z00.00 ROUTINE GENERAL MEDICAL EXAMINATION AT A HEALTH CARE FACILITY: ICD-10-CM

## 2019-12-10 DIAGNOSIS — E11.9 TYPE 2 DIABETES MELLITUS WITHOUT COMPLICATION, WITHOUT LONG-TERM CURRENT USE OF INSULIN (HCC): ICD-10-CM

## 2019-12-10 DIAGNOSIS — Z00.00 ENCOUNTER FOR INITIAL PREVENTIVE PHYSICAL EXAMINATION COVERED BY MEDICARE: Primary | ICD-10-CM

## 2019-12-10 LAB
BASOPHILS ABSOLUTE: 0.1 K/UL (ref 0–0.2)
BASOPHILS RELATIVE PERCENT: 0.9 %
EOSINOPHILS ABSOLUTE: 0.1 K/UL (ref 0–0.6)
EOSINOPHILS RELATIVE PERCENT: 1.8 %
HCT VFR BLD CALC: 42.5 % (ref 36–48)
HEMOGLOBIN: 13.8 G/DL (ref 12–16)
LYMPHOCYTES ABSOLUTE: 1.7 K/UL (ref 1–5.1)
LYMPHOCYTES RELATIVE PERCENT: 23.2 %
MCH RBC QN AUTO: 29 PG (ref 26–34)
MCHC RBC AUTO-ENTMCNC: 32.6 G/DL (ref 31–36)
MCV RBC AUTO: 89.2 FL (ref 80–100)
MONOCYTES ABSOLUTE: 0.6 K/UL (ref 0–1.3)
MONOCYTES RELATIVE PERCENT: 8.1 %
NEUTROPHILS ABSOLUTE: 4.9 K/UL (ref 1.7–7.7)
NEUTROPHILS RELATIVE PERCENT: 66 %
PDW BLD-RTO: 15.3 % (ref 12.4–15.4)
PLATELET # BLD: 258 K/UL (ref 135–450)
PMV BLD AUTO: 9.2 FL (ref 5–10.5)
RBC # BLD: 4.77 M/UL (ref 4–5.2)
WBC # BLD: 7.4 K/UL (ref 4–11)

## 2019-12-10 PROCEDURE — G0439 PPPS, SUBSEQ VISIT: HCPCS | Performed by: INTERNAL MEDICINE

## 2019-12-10 PROCEDURE — G8484 FLU IMMUNIZE NO ADMIN: HCPCS | Performed by: INTERNAL MEDICINE

## 2019-12-10 PROCEDURE — 3044F HG A1C LEVEL LT 7.0%: CPT | Performed by: INTERNAL MEDICINE

## 2019-12-10 PROCEDURE — 3017F COLORECTAL CA SCREEN DOC REV: CPT | Performed by: INTERNAL MEDICINE

## 2019-12-10 PROCEDURE — 1123F ACP DISCUSS/DSCN MKR DOCD: CPT | Performed by: INTERNAL MEDICINE

## 2019-12-10 PROCEDURE — 4040F PNEUMOC VAC/ADMIN/RCVD: CPT | Performed by: INTERNAL MEDICINE

## 2019-12-10 ASSESSMENT — LIFESTYLE VARIABLES: HOW OFTEN DO YOU HAVE A DRINK CONTAINING ALCOHOL: 0

## 2019-12-10 ASSESSMENT — PATIENT HEALTH QUESTIONNAIRE - PHQ9
SUM OF ALL RESPONSES TO PHQ QUESTIONS 1-9: 0
SUM OF ALL RESPONSES TO PHQ QUESTIONS 1-9: 0

## 2019-12-11 LAB
A/G RATIO: 1.3 (ref 1.1–2.2)
ALBUMIN SERPL-MCNC: 4.3 G/DL (ref 3.4–5)
ALP BLD-CCNC: 88 U/L (ref 40–129)
ALT SERPL-CCNC: 18 U/L (ref 10–40)
ANION GAP SERPL CALCULATED.3IONS-SCNC: 16 MMOL/L (ref 3–16)
AST SERPL-CCNC: 25 U/L (ref 15–37)
BILIRUB SERPL-MCNC: 0.3 MG/DL (ref 0–1)
BUN BLDV-MCNC: 15 MG/DL (ref 7–20)
CALCIUM SERPL-MCNC: 9.7 MG/DL (ref 8.3–10.6)
CHLORIDE BLD-SCNC: 97 MMOL/L (ref 99–110)
CO2: 28 MMOL/L (ref 21–32)
CREAT SERPL-MCNC: 1 MG/DL (ref 0.6–1.2)
ESTIMATED AVERAGE GLUCOSE: 139.9 MG/DL
GFR AFRICAN AMERICAN: >60
GFR NON-AFRICAN AMERICAN: 54
GLOBULIN: 3.3 G/DL
GLUCOSE BLD-MCNC: 178 MG/DL (ref 70–99)
HBA1C MFR BLD: 6.5 %
POTASSIUM SERPL-SCNC: 3.8 MMOL/L (ref 3.5–5.1)
SODIUM BLD-SCNC: 141 MMOL/L (ref 136–145)
TOTAL PROTEIN: 7.6 G/DL (ref 6.4–8.2)

## 2020-01-14 RX ORDER — DILTIAZEM HYDROCHLORIDE 360 MG/1
CAPSULE, EXTENDED RELEASE ORAL
Qty: 90 CAPSULE | Refills: 0 | Status: SHIPPED | OUTPATIENT
Start: 2020-01-14 | End: 2020-04-08 | Stop reason: SDUPTHER

## 2020-01-14 RX ORDER — LOSARTAN POTASSIUM AND HYDROCHLOROTHIAZIDE 25; 100 MG/1; MG/1
TABLET ORAL
Qty: 90 TABLET | Refills: 0 | Status: SHIPPED | OUTPATIENT
Start: 2020-01-14 | End: 2020-01-20 | Stop reason: ALTCHOICE

## 2020-01-20 ENCOUNTER — TELEPHONE (OUTPATIENT)
Dept: INTERNAL MEDICINE CLINIC | Age: 75
End: 2020-01-20

## 2020-01-20 RX ORDER — HYDROCHLOROTHIAZIDE 25 MG/1
25 TABLET ORAL DAILY
Qty: 90 TABLET | Refills: 0 | Status: SHIPPED | OUTPATIENT
Start: 2020-01-20 | End: 2020-04-08 | Stop reason: SDUPTHER

## 2020-01-20 RX ORDER — LOSARTAN POTASSIUM 100 MG/1
100 TABLET ORAL DAILY
Qty: 90 TABLET | Refills: 0 | Status: SHIPPED | OUTPATIENT
Start: 2020-01-20 | End: 2020-04-08 | Stop reason: SDUPTHER

## 2020-01-21 RX ORDER — PRAVASTATIN SODIUM 40 MG
TABLET ORAL
Qty: 90 TABLET | Refills: 0 | Status: SHIPPED | OUTPATIENT
Start: 2020-01-21 | End: 2020-04-08 | Stop reason: SDUPTHER

## 2020-01-21 RX ORDER — PIOGLITAZONEHYDROCHLORIDE 30 MG/1
TABLET ORAL
Qty: 90 TABLET | Refills: 0 | Status: SHIPPED | OUTPATIENT
Start: 2020-01-21 | End: 2020-04-08 | Stop reason: SDUPTHER

## 2020-01-21 RX ORDER — GLIMEPIRIDE 4 MG/1
TABLET ORAL
Qty: 90 TABLET | Refills: 0 | Status: SHIPPED | OUTPATIENT
Start: 2020-01-21 | End: 2020-04-08 | Stop reason: SDUPTHER

## 2020-03-11 RX ORDER — LANCETS
EACH MISCELLANEOUS
Qty: 100 EACH | Refills: 11 | Status: SHIPPED | OUTPATIENT
Start: 2020-03-11 | End: 2021-03-30

## 2020-03-11 RX ORDER — BLOOD SUGAR DIAGNOSTIC
STRIP MISCELLANEOUS
Qty: 100 STRIP | Refills: 11 | Status: SHIPPED | OUTPATIENT
Start: 2020-03-11 | End: 2021-03-12

## 2020-04-08 RX ORDER — HYDROCHLOROTHIAZIDE 25 MG/1
25 TABLET ORAL DAILY
Qty: 90 TABLET | Refills: 0 | Status: SHIPPED | OUTPATIENT
Start: 2020-04-08 | End: 2020-06-09

## 2020-04-08 RX ORDER — GLIMEPIRIDE 4 MG/1
TABLET ORAL
Qty: 90 TABLET | Refills: 0 | Status: SHIPPED | OUTPATIENT
Start: 2020-04-08 | End: 2020-06-09

## 2020-04-08 RX ORDER — LOSARTAN POTASSIUM 100 MG/1
100 TABLET ORAL DAILY
Qty: 90 TABLET | Refills: 0 | Status: SHIPPED | OUTPATIENT
Start: 2020-04-08 | End: 2020-06-09

## 2020-04-08 RX ORDER — PRAVASTATIN SODIUM 40 MG
TABLET ORAL
Qty: 90 TABLET | Refills: 0 | Status: SHIPPED | OUTPATIENT
Start: 2020-04-08 | End: 2020-06-09

## 2020-04-08 RX ORDER — PIOGLITAZONEHYDROCHLORIDE 30 MG/1
TABLET ORAL
Qty: 90 TABLET | Refills: 0 | Status: SHIPPED | OUTPATIENT
Start: 2020-04-08 | End: 2020-06-09

## 2020-04-08 RX ORDER — DILTIAZEM HYDROCHLORIDE 360 MG/1
CAPSULE, EXTENDED RELEASE ORAL
Qty: 90 CAPSULE | Refills: 0 | Status: SHIPPED | OUTPATIENT
Start: 2020-04-08 | End: 2020-06-09

## 2020-06-09 RX ORDER — LOSARTAN POTASSIUM 100 MG/1
100 TABLET ORAL DAILY
Qty: 90 TABLET | Refills: 0 | Status: SHIPPED | OUTPATIENT
Start: 2020-06-09 | End: 2020-09-01

## 2020-06-09 RX ORDER — HYDROCHLOROTHIAZIDE 25 MG/1
25 TABLET ORAL DAILY
Qty: 90 TABLET | Refills: 0 | Status: SHIPPED | OUTPATIENT
Start: 2020-06-09 | End: 2020-08-24

## 2020-06-09 RX ORDER — PIOGLITAZONEHYDROCHLORIDE 30 MG/1
TABLET ORAL
Qty: 90 TABLET | Refills: 0 | Status: SHIPPED | OUTPATIENT
Start: 2020-06-09 | End: 2020-08-24

## 2020-06-09 RX ORDER — GLIMEPIRIDE 4 MG/1
TABLET ORAL
Qty: 90 TABLET | Refills: 0 | Status: SHIPPED | OUTPATIENT
Start: 2020-06-09 | End: 2020-08-24

## 2020-06-09 RX ORDER — DILTIAZEM HYDROCHLORIDE 360 MG/1
CAPSULE, EXTENDED RELEASE ORAL
Qty: 90 CAPSULE | Refills: 0 | Status: SHIPPED | OUTPATIENT
Start: 2020-06-09 | End: 2020-08-24

## 2020-06-09 RX ORDER — PRAVASTATIN SODIUM 40 MG
TABLET ORAL
Qty: 90 TABLET | Refills: 0 | Status: SHIPPED | OUTPATIENT
Start: 2020-06-09 | End: 2020-08-24

## 2020-07-10 RX ORDER — VALSARTAN 160 MG/1
160 TABLET ORAL DAILY
Qty: 90 TABLET | Refills: 0 | Status: SHIPPED | OUTPATIENT
Start: 2020-07-10 | End: 2020-08-28

## 2020-07-10 NOTE — TELEPHONE ENCOUNTER
----- Message from Julina Kimball MD sent at 7/10/2020  2:16 PM EDT -----  Diovan 160 mg daily   ----- Message -----  From: Sebas Rosas  Sent: 7/10/2020   2:12 PM EDT  To: Julian Kimball MD    Pt's losartan 100 mg has been recalled. Please advise.   [facility]

## 2020-08-24 RX ORDER — DILTIAZEM HYDROCHLORIDE 360 MG/1
CAPSULE, EXTENDED RELEASE ORAL
Qty: 90 CAPSULE | Refills: 0 | Status: SHIPPED | OUTPATIENT
Start: 2020-08-24 | End: 2020-11-11

## 2020-08-24 RX ORDER — GLIMEPIRIDE 4 MG/1
TABLET ORAL
Qty: 90 TABLET | Refills: 0 | Status: SHIPPED | OUTPATIENT
Start: 2020-08-24 | End: 2020-11-11

## 2020-08-24 RX ORDER — PIOGLITAZONEHYDROCHLORIDE 30 MG/1
TABLET ORAL
Qty: 90 TABLET | Refills: 0 | Status: SHIPPED | OUTPATIENT
Start: 2020-08-24 | End: 2020-11-11

## 2020-08-24 RX ORDER — HYDROCHLOROTHIAZIDE 25 MG/1
25 TABLET ORAL DAILY
Qty: 90 TABLET | Refills: 0 | Status: SHIPPED | OUTPATIENT
Start: 2020-08-24 | End: 2020-09-01

## 2020-08-24 RX ORDER — PRAVASTATIN SODIUM 40 MG
TABLET ORAL
Qty: 90 TABLET | Refills: 0 | Status: SHIPPED | OUTPATIENT
Start: 2020-08-24 | End: 2020-11-11

## 2020-08-28 RX ORDER — VALSARTAN 160 MG/1
160 TABLET ORAL DAILY
Qty: 90 TABLET | Refills: 0 | Status: SHIPPED | OUTPATIENT
Start: 2020-08-28 | End: 2020-11-11

## 2020-09-01 ENCOUNTER — OFFICE VISIT (OUTPATIENT)
Dept: INTERNAL MEDICINE CLINIC | Age: 75
End: 2020-09-01

## 2020-09-01 VITALS
DIASTOLIC BLOOD PRESSURE: 80 MMHG | BODY MASS INDEX: 43.23 KG/M2 | RESPIRATION RATE: 12 BRPM | HEIGHT: 61 IN | HEART RATE: 70 BPM | WEIGHT: 229 LBS | SYSTOLIC BLOOD PRESSURE: 128 MMHG

## 2020-09-01 LAB
BILIRUBIN, POC: NORMAL
BLOOD URINE, POC: NORMAL
CLARITY, POC: NORMAL
COLOR, POC: NORMAL
CREATININE URINE: 29 MG/DL (ref 28–259)
GLUCOSE URINE, POC: NORMAL
KETONES, POC: NORMAL
LEUKOCYTE EST, POC: NORMAL
MICROALBUMIN UR-MCNC: <1.2 MG/DL
MICROALBUMIN/CREAT UR-RTO: NORMAL MG/G (ref 0–30)
NITRITE, POC: NORMAL
PH, POC: NORMAL
PROTEIN, POC: NORMAL
SPECIFIC GRAVITY, POC: NORMAL
UROBILINOGEN, POC: NORMAL

## 2020-09-01 PROCEDURE — G8400 PT W/DXA NO RESULTS DOC: HCPCS | Performed by: INTERNAL MEDICINE

## 2020-09-01 PROCEDURE — G8427 DOCREV CUR MEDS BY ELIG CLIN: HCPCS | Performed by: INTERNAL MEDICINE

## 2020-09-01 PROCEDURE — 99214 OFFICE O/P EST MOD 30 MIN: CPT | Performed by: INTERNAL MEDICINE

## 2020-09-01 PROCEDURE — 81002 URINALYSIS NONAUTO W/O SCOPE: CPT | Performed by: INTERNAL MEDICINE

## 2020-09-01 PROCEDURE — 3046F HEMOGLOBIN A1C LEVEL >9.0%: CPT | Performed by: INTERNAL MEDICINE

## 2020-09-01 PROCEDURE — 1036F TOBACCO NON-USER: CPT | Performed by: INTERNAL MEDICINE

## 2020-09-01 PROCEDURE — 2022F DILAT RTA XM EVC RTNOPTHY: CPT | Performed by: INTERNAL MEDICINE

## 2020-09-01 PROCEDURE — 1090F PRES/ABSN URINE INCON ASSESS: CPT | Performed by: INTERNAL MEDICINE

## 2020-09-01 PROCEDURE — 1123F ACP DISCUSS/DSCN MKR DOCD: CPT | Performed by: INTERNAL MEDICINE

## 2020-09-01 PROCEDURE — 3017F COLORECTAL CA SCREEN DOC REV: CPT | Performed by: INTERNAL MEDICINE

## 2020-09-01 PROCEDURE — 4040F PNEUMOC VAC/ADMIN/RCVD: CPT | Performed by: INTERNAL MEDICINE

## 2020-09-01 PROCEDURE — G8417 CALC BMI ABV UP PARAM F/U: HCPCS | Performed by: INTERNAL MEDICINE

## 2020-09-01 ASSESSMENT — ENCOUNTER SYMPTOMS
ABDOMINAL PAIN: 0
NAUSEA: 0
RHINORRHEA: 0
VOMITING: 0
WHEEZING: 0
SHORTNESS OF BREATH: 0

## 2020-09-01 NOTE — PROGRESS NOTES
Subjective:      Patient ID: Martín Rebollar is a 76 y.o. female. HPI  Patient is here for follow up of diabetes, hypertension, hyperlipidemia, OA, schizophrenia and heel pain. Patient's BGs consistently in an acceptable range. Patient does not have polydipsia and polyuria. She has been checking her sugar bid. Her sugars in am are 100-140 mg/dl with 89 mg/dl being the lowest. Her diabetes is well controlled. She is only taking her Amaryl daily. No hypoglycemia. No vision issues. No numbness or tingling in her feet. Patient's BP is controlled on current medications. No chest pain or dyspnea. Med compliant. Her cholesterol is at goal. No myalgias. She has mild edema. Review of Systems   Constitutional: Negative for appetite change and fatigue. HENT: Negative for postnasal drip and rhinorrhea. Respiratory: Negative for shortness of breath and wheezing. Cardiovascular: Negative for chest pain and palpitations. Gastrointestinal: Negative for abdominal pain, nausea and vomiting. Endocrine: Negative for polydipsia, polyphagia and polyuria. Musculoskeletal: Negative for joint swelling. Skin: Negative for rash. Neurological: Negative for light-headedness. Psychiatric/Behavioral: Negative for sleep disturbance. Current Outpatient Medications   Medication Sig Dispense Refill    valsartan (DIOVAN) 160 MG tablet TAKE 1 TABLET BY MOUTH  DAILY 90 tablet 0    pioglitazone (ACTOS) 30 MG tablet TAKE 1 TABLET BY MOUTH ONCE DAILY 90 tablet 0    glimepiride (AMARYL) 4 MG tablet TAKE 1 TABLET BY MOUTH ONCE DAILY IN THE MORNING BEFORE BREAKFAST 90 tablet 0    dilTIAZem (CARDIZEM CD) 360 MG extended release capsule TAKE 1 CAPSULE BY MOUTH  ONCE DAILY 90 capsule 0    pravastatin (PRAVACHOL) 40 MG tablet TAKE 1 TABLET BY MOUTH ONCE DAILY 90 tablet 0    losartan (COZAAR) 100 MG tablet TAKE 1 TABLET BY MOUTH  DAILY 90 tablet 0    Accu-Chek Softclix Lancets MISC USE TO TEST TWICE A DAY.  DX: E11.9 100 each 11    blood glucose test strips (ACCU-CHEK NEY PLUS) strip USE TO TEST TWICE A DAY. DX: E11.9 100 strip 11    Calcium Carb-Cholecalciferol (CALCIUM PLUS VITAMIN D3) 600-800 MG-UNIT TABS Take by mouth 2 times daily      Wheat Dextrin (BENEFIBER PO) Take by mouth as needed      acetaminophen (TYLENOL) 325 MG tablet Take 325 mg by mouth as needed for Pain      busPIRone (BUSPAR) 10 MG tablet Take 10 mg by mouth 2 times daily      Blood Glucose Monitoring Suppl (ACCU-CHEK NEY PLUS) w/Device KIT Check twice daily. DX: E11.9 1 kit 0    Lancets MISC One every day 100 each 2    aspirin 81 MG EC tablet Take 81 mg by mouth daily.  risperidone (RISPERDAL) 2 MG tablet Take 2 mg by mouth daily. No current facility-administered medications for this visit. There are no changes to past medical history, family history, social history or review of systems(except as noted in the history section) since prior note (all reviewed with patient). /80 (Site: Right Upper Arm, Position: Sitting, Cuff Size: Large Adult)   Pulse 70   Resp 12   Ht 5' 1\" (1.549 m)   Wt 229 lb (103.9 kg)   BMI 43.27 kg/m²     Objective:   Physical Exam   Constitutional: She is oriented to person, place, and time. She appears well-developed and well-nourished. HENT:   Head: Normocephalic. Eyes: Pupils are equal, round, and reactive to light. Conjunctivae and EOM are normal.   Neck: Trachea normal and normal range of motion. Neck supple. No JVD present. Carotid bruit is not present. No thyroid mass and no thyromegaly present. Cardiovascular: Normal rate and regular rhythm. Exam reveals no gallop. Murmur heard. Systolic murmur is present. Pulmonary/Chest: Effort normal and breath sounds normal. No respiratory distress. She has no wheezes. She has no rales. Abdominal: Soft. Bowel sounds are normal. She exhibits no distension and no mass. There is no splenomegaly or hepatomegaly.  There is no abdominal tenderness. Musculoskeletal:         General: Edema (trace) present. Lymphadenopathy:     She has no cervical adenopathy. Neurological: She is alert and oriented to person, place, and time. She displays tremor. Skin: Skin is warm and dry. No rash noted. Psychiatric: She has a normal mood and affect. Her behavior is normal. Judgment and thought content normal.     ECHO done on 7/14/2015  Conclusions    Summary  Normal left ventricle size, wall thickness and systolic function with an  estimated ejection fraction of 55%. No regional wall motion abnormalities  are seen. Borderline concentric LVH. There is trivial tricuspid regurgitation. Unable to obtain SPAP due to lack of TR envelope. Assessment:       Diagnosis Orders   1. Type 2 diabetes mellitus without complication, without long-term current use of insulin ([facility])  Microalbumin / Creatinine Urine Ratio    POCT Urinalysis no Micro    Comprehensive Metabolic Panel    CBC Auto Differential    Hemoglobin A1C    Lipid Panel    Uric Acid   2. Hyperlipidemia associated with type 2 diabetes mellitus ([facility]     3. Chronic kidney disease, stage III (moderate) ([facility])     4. Essential hypertension     5. Morbid obesity with BMI of 40.0-44.9, adult ([facility] [facility]     6. Paranoid type schizophrenia ([address]     7. Osteoporosis screening     8. Age-related osteoporosis without current pathological fracture  CARMINA Dexa Bone Density Scan          Plan:        DM: Well controlled. Check A1C. Hypertension:  At goal. On meds. She wants to stop her HCTZ. Hyperlipidemia:  At goal.   Schizophrenia: on medication. OA: no changes. Heart murmur:  No issues. DEXA scan. Discussed use, benefit, and side effects of prescribed medications. Barriers to medication compliance addressed. All patient questions answered. Pt voiced understanding. Decrease calorie intake. Exercise, weight loss recommended.   The current medical regimen is effective;  continue present plan and medications. See orders.

## 2020-09-03 DIAGNOSIS — E11.9 TYPE 2 DIABETES MELLITUS WITHOUT COMPLICATION, WITHOUT LONG-TERM CURRENT USE OF INSULIN (HCC): ICD-10-CM

## 2020-09-03 LAB
A/G RATIO: 1.6 (ref 1.1–2.2)
ALBUMIN SERPL-MCNC: 4.2 G/DL (ref 3.4–5)
ALP BLD-CCNC: 85 U/L (ref 40–129)
ALT SERPL-CCNC: 14 U/L (ref 10–40)
ANION GAP SERPL CALCULATED.3IONS-SCNC: 9 MMOL/L (ref 3–16)
AST SERPL-CCNC: 21 U/L (ref 15–37)
BASOPHILS ABSOLUTE: 0.1 K/UL (ref 0–0.2)
BASOPHILS RELATIVE PERCENT: 0.9 %
BILIRUB SERPL-MCNC: 0.3 MG/DL (ref 0–1)
BUN BLDV-MCNC: 19 MG/DL (ref 7–20)
CALCIUM SERPL-MCNC: 9.7 MG/DL (ref 8.3–10.6)
CHLORIDE BLD-SCNC: 105 MMOL/L (ref 99–110)
CHOLESTEROL, TOTAL: 160 MG/DL (ref 0–199)
CO2: 30 MMOL/L (ref 21–32)
CREAT SERPL-MCNC: 1 MG/DL (ref 0.6–1.2)
EOSINOPHILS ABSOLUTE: 0.2 K/UL (ref 0–0.6)
EOSINOPHILS RELATIVE PERCENT: 2.7 %
GFR AFRICAN AMERICAN: >60
GFR NON-AFRICAN AMERICAN: 54
GLOBULIN: 2.6 G/DL
GLUCOSE BLD-MCNC: 91 MG/DL (ref 70–99)
HCT VFR BLD CALC: 42.5 % (ref 36–48)
HDLC SERPL-MCNC: 67 MG/DL (ref 40–60)
HEMOGLOBIN: 13.9 G/DL (ref 12–16)
LDL CHOLESTEROL CALCULATED: 76 MG/DL
LYMPHOCYTES ABSOLUTE: 1.6 K/UL (ref 1–5.1)
LYMPHOCYTES RELATIVE PERCENT: 26.4 %
MCH RBC QN AUTO: 29 PG (ref 26–34)
MCHC RBC AUTO-ENTMCNC: 32.8 G/DL (ref 31–36)
MCV RBC AUTO: 88.3 FL (ref 80–100)
MONOCYTES ABSOLUTE: 0.6 K/UL (ref 0–1.3)
MONOCYTES RELATIVE PERCENT: 9.7 %
NEUTROPHILS ABSOLUTE: 3.7 K/UL (ref 1.7–7.7)
NEUTROPHILS RELATIVE PERCENT: 60.3 %
PDW BLD-RTO: 15.8 % (ref 12.4–15.4)
PLATELET # BLD: 217 K/UL (ref 135–450)
PMV BLD AUTO: 9.1 FL (ref 5–10.5)
POTASSIUM SERPL-SCNC: 4.2 MMOL/L (ref 3.5–5.1)
RBC # BLD: 4.81 M/UL (ref 4–5.2)
SODIUM BLD-SCNC: 144 MMOL/L (ref 136–145)
TOTAL PROTEIN: 6.8 G/DL (ref 6.4–8.2)
TRIGL SERPL-MCNC: 84 MG/DL (ref 0–150)
URIC ACID, SERUM: 5.5 MG/DL (ref 2.6–6)
VLDLC SERPL CALC-MCNC: 17 MG/DL
WBC # BLD: 6.1 K/UL (ref 4–11)

## 2020-09-04 LAB
ESTIMATED AVERAGE GLUCOSE: 137 MG/DL
HBA1C MFR BLD: 6.4 %

## 2020-10-15 ENCOUNTER — OFFICE VISIT (OUTPATIENT)
Dept: INTERNAL MEDICINE CLINIC | Age: 75
End: 2020-10-15

## 2020-10-15 VITALS
DIASTOLIC BLOOD PRESSURE: 80 MMHG | TEMPERATURE: 97.9 F | SYSTOLIC BLOOD PRESSURE: 160 MMHG | HEIGHT: 61 IN | BODY MASS INDEX: 43.43 KG/M2 | HEART RATE: 70 BPM | RESPIRATION RATE: 12 BRPM | WEIGHT: 230 LBS

## 2020-10-15 PROCEDURE — 1090F PRES/ABSN URINE INCON ASSESS: CPT | Performed by: INTERNAL MEDICINE

## 2020-10-15 PROCEDURE — 1123F ACP DISCUSS/DSCN MKR DOCD: CPT | Performed by: INTERNAL MEDICINE

## 2020-10-15 PROCEDURE — G8484 FLU IMMUNIZE NO ADMIN: HCPCS | Performed by: INTERNAL MEDICINE

## 2020-10-15 PROCEDURE — G8417 CALC BMI ABV UP PARAM F/U: HCPCS | Performed by: INTERNAL MEDICINE

## 2020-10-15 PROCEDURE — G8400 PT W/DXA NO RESULTS DOC: HCPCS | Performed by: INTERNAL MEDICINE

## 2020-10-15 PROCEDURE — 1036F TOBACCO NON-USER: CPT | Performed by: INTERNAL MEDICINE

## 2020-10-15 PROCEDURE — 99213 OFFICE O/P EST LOW 20 MIN: CPT | Performed by: INTERNAL MEDICINE

## 2020-10-15 PROCEDURE — 4040F PNEUMOC VAC/ADMIN/RCVD: CPT | Performed by: INTERNAL MEDICINE

## 2020-10-15 PROCEDURE — G8427 DOCREV CUR MEDS BY ELIG CLIN: HCPCS | Performed by: INTERNAL MEDICINE

## 2020-10-15 PROCEDURE — 3017F COLORECTAL CA SCREEN DOC REV: CPT | Performed by: INTERNAL MEDICINE

## 2020-10-15 RX ORDER — HYDROCHLOROTHIAZIDE 25 MG/1
25 TABLET ORAL DAILY
Qty: 90 TABLET | Refills: 0 | Status: SHIPPED | OUTPATIENT
Start: 2020-10-15 | End: 2020-12-03

## 2020-10-15 NOTE — PROGRESS NOTES
Subjective:      Patient ID: Julian Tan is a 76 y.o. female. HPI     Patient is here for a follow up for HTN. She had stopped her HCTZ as she felt it was affecting her vision. She saw the eye doctor and her eye exam was normal.  Her BP is high today. She wants to go back to taking her HCTZ. Review of Systems  See hpi    There are no changes to past medical history, family history, social history or review of systems(except as noted in the history section) since prior note (all reviewed with patient). Current Outpatient Medications   Medication Instructions    Accu-Chek Softclix Lancets MISC USE TO TEST TWICE A DAY. DX: E11.9    acetaminophen (TYLENOL) 325 mg, Oral, PRN    aspirin 81 mg, DAILY    Blood Glucose Monitoring Suppl (ACCU-CHEK NEY PLUS) w/Device KIT Check twice daily. DX: E11.9    blood glucose test strips (ACCU-CHEK NEY PLUS) strip USE TO TEST TWICE A DAY. DX: E11.9    busPIRone (BUSPAR) 10 mg, Oral, 2 TIMES DAILY    Calcium Carb-Cholecalciferol (CALCIUM PLUS VITAMIN D3) 600-800 MG-UNIT TABS Oral, 2 TIMES DAILY    dilTIAZem (CARDIZEM CD) 360 MG extended release capsule TAKE 1 CAPSULE BY MOUTH  ONCE DAILY    glimepiride (AMARYL) 4 MG tablet TAKE 1 TABLET BY MOUTH ONCE DAILY IN THE MORNING BEFORE BREAKFAST    hydroCHLOROthiazide (HYDRODIURIL) 25 mg, Oral, DAILY    Lancets MISC One every day    pioglitazone (ACTOS) 30 MG tablet TAKE 1 TABLET BY MOUTH ONCE DAILY    pravastatin (PRAVACHOL) 40 MG tablet TAKE 1 TABLET BY MOUTH ONCE DAILY    risperiDONE (RISPERDAL) 2 mg, DAILY    valsartan (DIOVAN) 160 mg, Oral, DAILY    Wheat Dextrin (BENEFIBER PO) Oral, PRN       Blood pressure (!) 160/80, pulse 70, temperature 97.9 °F (36.6 °C), resp. rate 12, height 5' 1\" (1.549 m), weight 230 lb (104.3 kg). Objective:   Physical Exam  Constitutional:       Appearance: She is well-developed. HENT:      Head: Normocephalic and atraumatic.    Eyes:      Pupils: Pupils are equal, round, and reactive to light. Neck:      Musculoskeletal: Normal range of motion and neck supple. Thyroid: No thyromegaly. Cardiovascular:      Rate and Rhythm: Normal rate and regular rhythm. Heart sounds: Normal heart sounds. No murmur. Pulmonary:      Effort: Pulmonary effort is normal.      Breath sounds: Normal breath sounds. No wheezing. Assessment:       Diagnosis Orders   1. Essential hypertension            Plan:      #  Resume HCTZ. Monitor BP.         Fatemeh Sierra MD

## 2020-11-11 RX ORDER — VALSARTAN 160 MG/1
160 TABLET ORAL DAILY
Qty: 90 TABLET | Refills: 0 | Status: SHIPPED | OUTPATIENT
Start: 2020-11-11 | End: 2021-01-19

## 2020-11-11 RX ORDER — DILTIAZEM HYDROCHLORIDE 360 MG/1
CAPSULE, EXTENDED RELEASE ORAL
Qty: 90 CAPSULE | Refills: 0 | Status: SHIPPED | OUTPATIENT
Start: 2020-11-11 | End: 2021-02-02

## 2020-11-11 RX ORDER — PIOGLITAZONEHYDROCHLORIDE 30 MG/1
TABLET ORAL
Qty: 90 TABLET | Refills: 0 | Status: SHIPPED | OUTPATIENT
Start: 2020-11-11 | End: 2021-02-08

## 2020-11-11 RX ORDER — PRAVASTATIN SODIUM 40 MG
TABLET ORAL
Qty: 90 TABLET | Refills: 0 | Status: SHIPPED | OUTPATIENT
Start: 2020-11-11 | End: 2021-02-02

## 2020-11-11 RX ORDER — GLIMEPIRIDE 4 MG/1
TABLET ORAL
Qty: 90 TABLET | Refills: 0 | Status: SHIPPED | OUTPATIENT
Start: 2020-11-11 | End: 2021-02-02

## 2021-01-19 ENCOUNTER — OFFICE VISIT (OUTPATIENT)
Dept: INTERNAL MEDICINE CLINIC | Age: 76
End: 2021-01-19

## 2021-01-19 VITALS
SYSTOLIC BLOOD PRESSURE: 122 MMHG | RESPIRATION RATE: 12 BRPM | WEIGHT: 238 LBS | HEART RATE: 70 BPM | BODY MASS INDEX: 44.93 KG/M2 | TEMPERATURE: 97.8 F | HEIGHT: 61 IN | DIASTOLIC BLOOD PRESSURE: 80 MMHG

## 2021-01-19 DIAGNOSIS — E11.69 HYPERLIPIDEMIA ASSOCIATED WITH TYPE 2 DIABETES MELLITUS (HCC): ICD-10-CM

## 2021-01-19 DIAGNOSIS — E78.5 HYPERLIPIDEMIA ASSOCIATED WITH TYPE 2 DIABETES MELLITUS (HCC): ICD-10-CM

## 2021-01-19 DIAGNOSIS — E66.01 MORBID OBESITY WITH BMI OF 40.0-44.9, ADULT (HCC): ICD-10-CM

## 2021-01-19 DIAGNOSIS — F20.0 PARANOID TYPE SCHIZOPHRENIA (HCC): ICD-10-CM

## 2021-01-19 DIAGNOSIS — E11.9 TYPE 2 DIABETES MELLITUS WITHOUT COMPLICATION, WITHOUT LONG-TERM CURRENT USE OF INSULIN (HCC): ICD-10-CM

## 2021-01-19 DIAGNOSIS — N18.31 STAGE 3A CHRONIC KIDNEY DISEASE (HCC): ICD-10-CM

## 2021-01-19 DIAGNOSIS — E11.9 TYPE 2 DIABETES MELLITUS WITHOUT COMPLICATION, WITHOUT LONG-TERM CURRENT USE OF INSULIN (HCC): Primary | ICD-10-CM

## 2021-01-19 DIAGNOSIS — I10 ESSENTIAL HYPERTENSION: ICD-10-CM

## 2021-01-19 DIAGNOSIS — M81.0 AGE-RELATED OSTEOPOROSIS WITHOUT CURRENT PATHOLOGICAL FRACTURE: ICD-10-CM

## 2021-01-19 PROCEDURE — 3017F COLORECTAL CA SCREEN DOC REV: CPT | Performed by: INTERNAL MEDICINE

## 2021-01-19 PROCEDURE — 1123F ACP DISCUSS/DSCN MKR DOCD: CPT | Performed by: INTERNAL MEDICINE

## 2021-01-19 PROCEDURE — 4040F PNEUMOC VAC/ADMIN/RCVD: CPT | Performed by: INTERNAL MEDICINE

## 2021-01-19 PROCEDURE — 2022F DILAT RTA XM EVC RTNOPTHY: CPT | Performed by: INTERNAL MEDICINE

## 2021-01-19 PROCEDURE — 1090F PRES/ABSN URINE INCON ASSESS: CPT | Performed by: INTERNAL MEDICINE

## 2021-01-19 PROCEDURE — G8484 FLU IMMUNIZE NO ADMIN: HCPCS | Performed by: INTERNAL MEDICINE

## 2021-01-19 PROCEDURE — 3046F HEMOGLOBIN A1C LEVEL >9.0%: CPT | Performed by: INTERNAL MEDICINE

## 2021-01-19 PROCEDURE — G8417 CALC BMI ABV UP PARAM F/U: HCPCS | Performed by: INTERNAL MEDICINE

## 2021-01-19 PROCEDURE — 1036F TOBACCO NON-USER: CPT | Performed by: INTERNAL MEDICINE

## 2021-01-19 PROCEDURE — G8400 PT W/DXA NO RESULTS DOC: HCPCS | Performed by: INTERNAL MEDICINE

## 2021-01-19 PROCEDURE — G8427 DOCREV CUR MEDS BY ELIG CLIN: HCPCS | Performed by: INTERNAL MEDICINE

## 2021-01-19 PROCEDURE — 99214 OFFICE O/P EST MOD 30 MIN: CPT | Performed by: INTERNAL MEDICINE

## 2021-01-19 RX ORDER — HYDROCHLOROTHIAZIDE 25 MG/1
25 TABLET ORAL DAILY
Qty: 47 TABLET | Refills: 0 | Status: SHIPPED | OUTPATIENT
Start: 2021-01-19 | End: 2021-01-19

## 2021-01-19 RX ORDER — VALSARTAN AND HYDROCHLOROTHIAZIDE 160; 25 MG/1; MG/1
1 TABLET ORAL DAILY
Status: ON HOLD | COMMUNITY
End: 2021-07-21 | Stop reason: HOSPADM

## 2021-01-19 ASSESSMENT — ENCOUNTER SYMPTOMS
WHEEZING: 0
NAUSEA: 0
VOMITING: 0
RHINORRHEA: 0
ABDOMINAL PAIN: 0
SHORTNESS OF BREATH: 0

## 2021-01-19 ASSESSMENT — PATIENT HEALTH QUESTIONNAIRE - PHQ9
SUM OF ALL RESPONSES TO PHQ9 QUESTIONS 1 & 2: 0
1. LITTLE INTEREST OR PLEASURE IN DOING THINGS: 0
SUM OF ALL RESPONSES TO PHQ QUESTIONS 1-9: 0

## 2021-01-19 NOTE — PROGRESS NOTES
Lymphadenopathy:     She has no cervical adenopathy. Neurological: She is alert and oriented to person, place, and time. She displays tremor. Skin: Skin is warm and dry. No rash noted. Psychiatric: She has a normal mood and affect. Her behavior is normal. Judgment and thought content normal.     ECHO done on 7/14/2015  Conclusions    Summary  Normal left ventricle size, wall thickness and systolic function with an  estimated ejection fraction of 55%. No regional wall motion abnormalities  are seen. Borderline concentric LVH. There is trivial tricuspid regurgitation. Unable to obtain SPAP due to lack of TR envelope. Assessment:       Diagnosis Orders   1. Type 2 diabetes mellitus without complication, without long-term current use of insulin (HCC)  HM DIABETES FOOT EXAM    Hemoglobin A1C   2. Essential hypertension     3. Hyperlipidemia associated with type 2 diabetes mellitus (HCC)     4. Stage 3a chronic kidney disease     5. Morbid obesity with BMI of 40.0-44.9, adult ([facility]     6. Paranoid type schizophrenia ([facility]     7. Age-related osteoporosis without current pathological fracture            Plan:        DM: Well controlled. Check A1C. Hypertension:  At goal. Continue Diovan HCT. Hyperlipidemia:  At goal.   Schizophrenia: on medication. OA: no changes. Heart murmur:  No issues. Discussed use, benefit, and side effects of prescribed medications. Barriers to medication compliance addressed. All patient questions answered. Pt voiced understanding. Decrease calorie intake. Exercise, weight loss recommended. The current medical regimen is effective;  continue present plan and medications. See orders.

## 2021-01-20 LAB
ESTIMATED AVERAGE GLUCOSE: 131.2 MG/DL
HBA1C MFR BLD: 6.2 %

## 2021-02-02 RX ORDER — VALSARTAN 160 MG/1
TABLET ORAL
Qty: 90 TABLET | Refills: 3 | Status: SHIPPED | OUTPATIENT
Start: 2021-02-02 | End: 2021-03-12 | Stop reason: SDUPTHER

## 2021-02-02 RX ORDER — DILTIAZEM HYDROCHLORIDE 360 MG/1
CAPSULE, EXTENDED RELEASE ORAL
Qty: 90 CAPSULE | Refills: 3 | Status: SHIPPED | OUTPATIENT
Start: 2021-02-02 | End: 2021-03-12 | Stop reason: SDUPTHER

## 2021-02-02 RX ORDER — GLIMEPIRIDE 4 MG/1
TABLET ORAL
Qty: 90 TABLET | Refills: 3 | Status: SHIPPED | OUTPATIENT
Start: 2021-02-02 | End: 2021-03-12 | Stop reason: SDUPTHER

## 2021-02-02 RX ORDER — PRAVASTATIN SODIUM 40 MG
TABLET ORAL
Qty: 90 TABLET | Refills: 3 | Status: SHIPPED | OUTPATIENT
Start: 2021-02-02 | End: 2021-03-12 | Stop reason: SDUPTHER

## 2021-03-08 RX ORDER — HYDROCHLOROTHIAZIDE 25 MG/1
TABLET ORAL
Qty: 90 TABLET | Refills: 0 | Status: SHIPPED | OUTPATIENT
Start: 2021-03-08 | End: 2021-06-11

## 2021-03-12 RX ORDER — PIOGLITAZONEHYDROCHLORIDE 30 MG/1
TABLET ORAL
Qty: 90 TABLET | Refills: 0 | Status: SHIPPED | OUTPATIENT
Start: 2021-03-12 | End: 2021-07-15

## 2021-03-12 RX ORDER — PRAVASTATIN SODIUM 40 MG
TABLET ORAL
Qty: 90 TABLET | Refills: 0 | Status: SHIPPED | OUTPATIENT
Start: 2021-03-12 | End: 2021-07-15

## 2021-03-12 RX ORDER — DILTIAZEM HYDROCHLORIDE 360 MG/1
CAPSULE, EXTENDED RELEASE ORAL
Qty: 90 CAPSULE | Refills: 0 | Status: SHIPPED | OUTPATIENT
Start: 2021-03-12 | End: 2021-07-15

## 2021-03-12 RX ORDER — VALSARTAN 160 MG/1
TABLET ORAL
Qty: 90 TABLET | Refills: 0 | Status: SHIPPED | OUTPATIENT
Start: 2021-03-12 | End: 2021-05-13

## 2021-03-12 RX ORDER — GLIMEPIRIDE 4 MG/1
TABLET ORAL
Qty: 90 TABLET | Refills: 0 | Status: SHIPPED | OUTPATIENT
Start: 2021-03-12 | End: 2021-07-15

## 2021-03-12 RX ORDER — BLOOD SUGAR DIAGNOSTIC
STRIP MISCELLANEOUS
Qty: 100 STRIP | Refills: 11 | Status: SHIPPED | OUTPATIENT
Start: 2021-03-12 | End: 2022-03-16

## 2021-03-12 NOTE — TELEPHONE ENCOUNTER
----- Message from Dada Mariano sent at 3/12/2021  1:04 PM EST -----  Contact: Pt- [phone]  Pt called requesting a refill on the following:    pioglitazone (ACTOS) 30 MG tablet    valsartan (DIOVAN) 160 MG tablet    glimepiride (AMARYL) 4 MG tablet    dilTIAZem (CARDIZEM CD) 360 MG extended release capsule    pravastatin (PRAVACHOL) 40 MG tablet    Pt- [phone]    Pharmacy: [facility] [phone] [facility] [phone] [facility] [phone]    Future appt- 5/24/2021  Past appt- 1/19/2021

## 2021-03-30 RX ORDER — LANCETS
EACH MISCELLANEOUS
Qty: 100 EACH | Refills: 11 | Status: SHIPPED | OUTPATIENT
Start: 2021-03-30 | End: 2022-04-06 | Stop reason: SDUPTHER

## 2021-06-11 RX ORDER — HYDROCHLOROTHIAZIDE 25 MG/1
TABLET ORAL
Qty: 90 TABLET | Refills: 0 | Status: SHIPPED | OUTPATIENT
Start: 2021-06-11 | End: 2021-07-08 | Stop reason: SDUPTHER

## 2021-07-08 RX ORDER — HYDROCHLOROTHIAZIDE 25 MG/1
TABLET ORAL
Qty: 90 TABLET | Refills: 0 | Status: SHIPPED | OUTPATIENT
Start: 2021-07-08 | End: 2021-09-08

## 2021-07-15 RX ORDER — PIOGLITAZONEHYDROCHLORIDE 30 MG/1
TABLET ORAL
Qty: 90 TABLET | Refills: 0 | Status: SHIPPED | OUTPATIENT
Start: 2021-07-15 | End: 2021-11-05

## 2021-07-15 RX ORDER — PRAVASTATIN SODIUM 40 MG
TABLET ORAL
Qty: 90 TABLET | Refills: 0 | Status: SHIPPED | OUTPATIENT
Start: 2021-07-15 | End: 2021-11-05

## 2021-07-15 RX ORDER — GLIMEPIRIDE 4 MG/1
TABLET ORAL
Qty: 90 TABLET | Refills: 0 | Status: SHIPPED | OUTPATIENT
Start: 2021-07-15 | End: 2021-11-05

## 2021-07-15 RX ORDER — DILTIAZEM HYDROCHLORIDE 360 MG/1
CAPSULE, EXTENDED RELEASE ORAL
Qty: 90 CAPSULE | Refills: 0 | Status: ON HOLD | OUTPATIENT
Start: 2021-07-15 | End: 2021-07-21 | Stop reason: HOSPADM

## 2021-07-19 ENCOUNTER — HOSPITAL ENCOUNTER (INPATIENT)
Age: 76
LOS: 1 days | Discharge: HOME HEALTH | DRG: 563 | End: 2021-07-21
Attending: EMERGENCY MEDICINE | Admitting: INTERNAL MEDICINE
Payer: MEDICARE

## 2021-07-19 ENCOUNTER — APPOINTMENT (OUTPATIENT)
Dept: GENERAL RADIOLOGY | Age: 76
DRG: 563 | End: 2021-07-19
Payer: MEDICARE

## 2021-07-19 DIAGNOSIS — S42.251A CLOSED DISPLACED FRACTURE OF GREATER TUBEROSITY OF RIGHT HUMERUS, INITIAL ENCOUNTER: ICD-10-CM

## 2021-07-19 DIAGNOSIS — S43.014A ANTERIOR DISLOCATION OF RIGHT SHOULDER, INITIAL ENCOUNTER: Primary | ICD-10-CM

## 2021-07-19 DIAGNOSIS — R55 SYNCOPE AND COLLAPSE: ICD-10-CM

## 2021-07-19 LAB
A/G RATIO: 1.2 (ref 1.1–2.2)
ALBUMIN SERPL-MCNC: 3.9 G/DL (ref 3.4–5)
ALP BLD-CCNC: 85 U/L (ref 40–129)
ALT SERPL-CCNC: 15 U/L (ref 10–40)
ANION GAP SERPL CALCULATED.3IONS-SCNC: 9 MMOL/L (ref 3–16)
AST SERPL-CCNC: 23 U/L (ref 15–37)
BASOPHILS ABSOLUTE: 0 K/UL (ref 0–0.2)
BASOPHILS RELATIVE PERCENT: 0.5 %
BILIRUB SERPL-MCNC: 0.3 MG/DL (ref 0–1)
BUN BLDV-MCNC: 23 MG/DL (ref 7–20)
CALCIUM SERPL-MCNC: 9.3 MG/DL (ref 8.3–10.6)
CHLORIDE BLD-SCNC: 100 MMOL/L (ref 99–110)
CO2: 29 MMOL/L (ref 21–32)
CREAT SERPL-MCNC: 0.9 MG/DL (ref 0.6–1.2)
EKG ATRIAL RATE: 58 BPM
EKG DIAGNOSIS: NORMAL
EKG P AXIS: 52 DEGREES
EKG P-R INTERVAL: 192 MS
EKG Q-T INTERVAL: 434 MS
EKG QRS DURATION: 82 MS
EKG QTC CALCULATION (BAZETT): 426 MS
EKG R AXIS: 42 DEGREES
EKG T AXIS: 27 DEGREES
EKG VENTRICULAR RATE: 58 BPM
EOSINOPHILS ABSOLUTE: 0.1 K/UL (ref 0–0.6)
EOSINOPHILS RELATIVE PERCENT: 1.6 %
GFR AFRICAN AMERICAN: >60
GFR NON-AFRICAN AMERICAN: >60
GLOBULIN: 3.2 G/DL
GLUCOSE BLD-MCNC: 109 MG/DL (ref 70–99)
GLUCOSE BLD-MCNC: 122 MG/DL (ref 70–99)
HCT VFR BLD CALC: 39.7 % (ref 36–48)
HEMOGLOBIN: 13.3 G/DL (ref 12–16)
LYMPHOCYTES ABSOLUTE: 1.7 K/UL (ref 1–5.1)
LYMPHOCYTES RELATIVE PERCENT: 19.3 %
MCH RBC QN AUTO: 29.5 PG (ref 26–34)
MCHC RBC AUTO-ENTMCNC: 33.5 G/DL (ref 31–36)
MCV RBC AUTO: 88.1 FL (ref 80–100)
MONOCYTES ABSOLUTE: 0.8 K/UL (ref 0–1.3)
MONOCYTES RELATIVE PERCENT: 9 %
NEUTROPHILS ABSOLUTE: 6.1 K/UL (ref 1.7–7.7)
NEUTROPHILS RELATIVE PERCENT: 69.6 %
PDW BLD-RTO: 15.3 % (ref 12.4–15.4)
PERFORMED ON: ABNORMAL
PLATELET # BLD: 218 K/UL (ref 135–450)
PMV BLD AUTO: 8.3 FL (ref 5–10.5)
POTASSIUM REFLEX MAGNESIUM: 3.8 MMOL/L (ref 3.5–5.1)
PRO-BNP: 190 PG/ML (ref 0–449)
RBC # BLD: 4.51 M/UL (ref 4–5.2)
SARS-COV-2, NAAT: NOT DETECTED
SODIUM BLD-SCNC: 138 MMOL/L (ref 136–145)
TOTAL PROTEIN: 7.1 G/DL (ref 6.4–8.2)
TROPONIN: <0.01 NG/ML
WBC # BLD: 8.8 K/UL (ref 4–11)

## 2021-07-19 PROCEDURE — 87635 SARS-COV-2 COVID-19 AMP PRB: CPT

## 2021-07-19 PROCEDURE — 73060 X-RAY EXAM OF HUMERUS: CPT

## 2021-07-19 PROCEDURE — 93005 ELECTROCARDIOGRAM TRACING: CPT | Performed by: PHYSICIAN ASSISTANT

## 2021-07-19 PROCEDURE — 96375 TX/PRO/DX INJ NEW DRUG ADDON: CPT

## 2021-07-19 PROCEDURE — 83880 ASSAY OF NATRIURETIC PEPTIDE: CPT

## 2021-07-19 PROCEDURE — 85025 COMPLETE CBC W/AUTO DIFF WBC: CPT

## 2021-07-19 PROCEDURE — 23650 CLTX SHO DSLC W/MNPJ WO ANES: CPT

## 2021-07-19 PROCEDURE — 96374 THER/PROPH/DIAG INJ IV PUSH: CPT

## 2021-07-19 PROCEDURE — 6360000002 HC RX W HCPCS: Performed by: PHYSICIAN ASSISTANT

## 2021-07-19 PROCEDURE — 73030 X-RAY EXAM OF SHOULDER: CPT

## 2021-07-19 PROCEDURE — 6360000002 HC RX W HCPCS: Performed by: EMERGENCY MEDICINE

## 2021-07-19 PROCEDURE — 99285 EMERGENCY DEPT VISIT HI MDM: CPT

## 2021-07-19 PROCEDURE — 0RSJXZZ REPOSITION RIGHT SHOULDER JOINT, EXTERNAL APPROACH: ICD-10-PCS | Performed by: INTERNAL MEDICINE

## 2021-07-19 PROCEDURE — 6370000000 HC RX 637 (ALT 250 FOR IP): Performed by: PHYSICIAN ASSISTANT

## 2021-07-19 PROCEDURE — 93010 ELECTROCARDIOGRAM REPORT: CPT | Performed by: INTERNAL MEDICINE

## 2021-07-19 PROCEDURE — 84484 ASSAY OF TROPONIN QUANT: CPT

## 2021-07-19 PROCEDURE — 71045 X-RAY EXAM CHEST 1 VIEW: CPT

## 2021-07-19 PROCEDURE — 80053 COMPREHEN METABOLIC PANEL: CPT

## 2021-07-19 RX ORDER — ONDANSETRON 2 MG/ML
4 INJECTION INTRAMUSCULAR; INTRAVENOUS EVERY 6 HOURS PRN
Status: DISCONTINUED | OUTPATIENT
Start: 2021-07-19 | End: 2021-07-20

## 2021-07-19 RX ORDER — MORPHINE SULFATE 4 MG/ML
4 INJECTION, SOLUTION INTRAMUSCULAR; INTRAVENOUS ONCE
Status: COMPLETED | OUTPATIENT
Start: 2021-07-19 | End: 2021-07-19

## 2021-07-19 RX ORDER — PROPOFOL 10 MG/ML
70 INJECTION, EMULSION INTRAVENOUS ONCE
Status: COMPLETED | OUTPATIENT
Start: 2021-07-19 | End: 2021-07-19

## 2021-07-19 RX ORDER — HYDROCODONE BITARTRATE AND ACETAMINOPHEN 5; 325 MG/1; MG/1
1 TABLET ORAL ONCE
Status: COMPLETED | OUTPATIENT
Start: 2021-07-19 | End: 2021-07-19

## 2021-07-19 RX ADMIN — MORPHINE SULFATE 4 MG: 4 INJECTION, SOLUTION INTRAMUSCULAR; INTRAVENOUS at 21:26

## 2021-07-19 RX ADMIN — PROPOFOL 70 MG: 10 INJECTION, EMULSION INTRAVENOUS at 22:22

## 2021-07-19 RX ADMIN — HYDROCODONE BITARTRATE AND ACETAMINOPHEN 1 TABLET: 5; 325 TABLET ORAL at 19:40

## 2021-07-19 RX ADMIN — ONDANSETRON HYDROCHLORIDE 4 MG: 2 INJECTION, SOLUTION INTRAMUSCULAR; INTRAVENOUS at 20:26

## 2021-07-19 ASSESSMENT — PAIN SCALES - GENERAL
PAINLEVEL_OUTOF10: 8
PAINLEVEL_OUTOF10: 5
PAINLEVEL_OUTOF10: 2

## 2021-07-19 ASSESSMENT — PAIN DESCRIPTION - ORIENTATION: ORIENTATION: RIGHT

## 2021-07-19 ASSESSMENT — PAIN DESCRIPTION - LOCATION: LOCATION: ARM

## 2021-07-19 ASSESSMENT — PAIN DESCRIPTION - PAIN TYPE: TYPE: ACUTE PAIN

## 2021-07-19 ASSESSMENT — ENCOUNTER SYMPTOMS
GASTROINTESTINAL NEGATIVE: 1
RESPIRATORY NEGATIVE: 1

## 2021-07-19 ASSESSMENT — PAIN DESCRIPTION - DESCRIPTORS: DESCRIPTORS: ACHING

## 2021-07-19 NOTE — ED PROVIDER NOTES
[address] ED  EMERGENCY DEPARTMENT ENCOUNTER        Pt Name: Leslie Boothe  MRN: [id number]  [place] [birth year]  Date of evaluation: 7/19/2021  Provider: Tato Samson PA-C  PCP: Alicia Mead MD  Note Started: 7:21 PM EDT        I have seen and evaluated this patient with my supervising physician Steffany Clements MD.    [facility]       Chief Complaint   Patient presents with    Fall     Pt arrived via EMS, two falls this week. C/o pain in right arm post fall, splint applied via EMS. Zofran PTA. HISTORY OF PRESENT ILLNESS   (Location, Timing/Onset, Context/Setting, Quality, Duration, Modifying Factors, Severity, Associated Signs and Symptoms)  Note limiting factors. Chief Complaint: Right shoulder pain     Leslie Boothe is a 76 y.o. female who presents by EMS for evaluation of right shoulder pain after a possible syncopal episode. States she was walking in her kitchen and fell. Denied hitting her head or loss of consciousness. Reports she has had frequent falls over the past 1 week. Today she is complaining of right shoulder pain and arm pain. Denies chest pain or shortness of breath. Onset occurred just prior to arrival to the ED. Duration symptoms have been persistent since onset. Context includes a possible syncopal episode and right shoulder pain. No aggravating complaints. No alleviating complaints. She denies any pain to her abdomen or her back. Denies any numbness or tingling. She has not been able to ambulate since the fall. Otherwise denies any other complaints. She has not taken anything today for pain control. She rates her pain a 5 out of 10. No radiation of pain. Nursing Notes were all reviewed and agreed with or any disagreements were addressed in the HPI. REVIEW OF SYSTEMS    (2-9 systems for level 4, 10 or more for level 5)     Review of Systems   Constitutional: Negative. Respiratory: Negative. Cardiovascular: Negative. Gastrointestinal: Negative. Genitourinary: Negative. Musculoskeletal: Positive for arthralgias. Skin: Negative. Neurological: Positive for syncope. Positives and Pertinent negatives as per HPI. Except as noted above in the ROS, all other systems were reviewed and negative. PAST MEDICAL HISTORY     Past Medical History:   Diagnosis Date    Hyperlipidemia     Hyperlipidemia associated with type 2 diabetes mellitus ([facility] 10/5/2015    Hypertension     Morbid obesity with BMI of 40.0-44.9, adult ([facility] 10/5/2015    Osteoarthrosis, not specified whether generalized/localized, lower leg     Paranoid type schizophrenia ([facility]     Type 2 diabetes mellitus without complication ([facility] 56/2/5736    Type II or unspecified type diabetes mellitus without mention of complication, not stated as uncontrolled          SURGICAL HISTORY     Past Surgical History:   Procedure Laterality Date    BLEPHAROPLASTY  5/10/2012    COLONOSCOPY  8/1/2007    JOINT REPLACEMENT           CURRENTMEDICATIONS       Previous Medications    ACCU-CHEK SOFTCLIX LANCETS MISC    USE TO TEST TWICE A DAY. DX:E11.9    ACETAMINOPHEN (TYLENOL) 325 MG TABLET    Take 325 mg by mouth as needed for Pain    ASPIRIN 81 MG EC TABLET    Take 81 mg by mouth daily. BLOOD GLUCOSE MONITORING SUPPL (ACCU-CHEK NEY PLUS) W/DEVICE KIT    Check twice daily. DX: E11.9    BLOOD GLUCOSE TEST STRIPS (ACCU-CHEK NEY PLUS) STRIP    USE TO TEST TWICE A DAY.  DX: E11.9    BUSPIRONE (BUSPAR) 10 MG TABLET    Take 10 mg by mouth 2 times daily    CALCIUM CARB-CHOLECALCIFEROL (CALCIUM PLUS VITAMIN D3) 600-800 MG-UNIT TABS    Take by mouth 2 times daily    DILTIAZEM (CARDIZEM CD) 360 MG EXTENDED RELEASE CAPSULE    TAKE 1 CAPSULE BY MOUTH  ONCE DAILY    GLIMEPIRIDE (AMARYL) 4 MG TABLET    TAKE 1 TABLET BY MOUTH ONCE DAILY IN THE MORNING BEFORE BREAKFAST    HYDROCHLOROTHIAZIDE (HYDRODIURIL) 25 MG TABLET    TAKE 1 TABLET BY MOUTH EVERY DAY    LANCETS MISC    One every day    PIOGLITAZONE (ACTOS) 30 MG TABLET    TAKE 1 TABLET BY MOUTH ONCE DAILY    PRAVASTATIN (PRAVACHOL) 40 MG TABLET    TAKE 1 TABLET BY MOUTH ONCE DAILY    RISPERIDONE (RISPERDAL) 2 MG TABLET    Take 2 mg by mouth daily. VALSARTAN (DIOVAN) 160 MG TABLET    TAKE 1 TABLET BY MOUTH  DAILY    VALSARTAN-HYDROCHLOROTHIAZIDE (DIOVAN-HCT) 160-25 MG PER TABLET    Take 1 tablet by mouth daily    WHEAT DEXTRIN (BENEFIBER PO)    Take by mouth as needed         ALLERGIES     Tessalon [benzonatate], Diazepam, Diclofenac sodium, Lycopene, Metformin, Olanzapine, Tape [adhesive tape], and Zocor [simvastatin - high dose]    FAMILYHISTORY       Family History   Problem Relation Age of Onset    Cancer Sister         Ovarian cancer    Dementia Brother           SOCIAL HISTORY       Social History     Tobacco Use    Smoking status: Never Smoker    Smokeless tobacco: Never Used   Vaping Use    Vaping Use: Never used   Substance Use Topics    Alcohol use: No    Drug use: No       SCREENINGS             PHYSICAL EXAM    (up to 7 for level 4, 8 or more for level 5)     ED Triage Vitals   BP Temp Temp Source Pulse Resp SpO2 Height Weight   07/19/21 1859 07/19/21 1859 07/19/21 1859 07/19/21 1859 07/19/21 1859 07/19/21 1859 07/19/21 1858 07/19/21 1858   (!) 122/56 98.7 °F (37.1 °C) Oral 58 16 94 % 5' 1\" (1.549 m) 230 lb (104.3 kg)       Physical Exam  Vitals and nursing note reviewed. Constitutional:       General: She is awake. She is not in acute distress. Appearance: Normal appearance. She is well-developed. She is not ill-appearing, toxic-appearing or diaphoretic. Interventions: Backboard in place. Comments: Arrived via EMS on backboard. HENT:      Head: Normocephalic and atraumatic. Nose: Nose normal.   Eyes:      General:         Right eye: No discharge. Left eye: No discharge. Cardiovascular:      Rate and Rhythm: Normal rate and regular rhythm. Pulses:           Radial pulses are 2+ on the right side and 2+ on the left side. Dorsalis pedis pulses are 2+ on the right side and 2+ on the left side. Posterior tibial pulses are 2+ on the right side and 2+ on the left side. Heart sounds: Normal heart sounds. No murmur heard. No gallop. Pulmonary:      Effort: Pulmonary effort is normal. No respiratory distress. Breath sounds: Normal breath sounds. No decreased breath sounds, wheezing, rhonchi or rales. Chest:      Chest wall: No tenderness. Abdominal:      General: Abdomen is flat. Palpations: Abdomen is soft. Tenderness: There is no abdominal tenderness. There is no right CVA tenderness, left CVA tenderness, guarding or rebound. Negative signs include Figueroa's sign and McBurney's sign. Musculoskeletal:         General: No deformity. Right shoulder: Tenderness and bony tenderness present. No swelling, deformity, effusion, laceration or crepitus. Decreased range of motion. Normal strength. Normal pulse. Right upper arm: Tenderness and bony tenderness present. No swelling, edema, deformity or lacerations. Right elbow: Normal. No swelling, deformity, effusion or lacerations. Normal range of motion. No tenderness. Right forearm: Normal. No swelling, edema, deformity, lacerations, tenderness or bony tenderness. Right wrist: Normal. No swelling, deformity, effusion, lacerations, tenderness, bony tenderness, snuff box tenderness or crepitus. Normal range of motion. Normal pulse. Right hand: Normal. No swelling, deformity, lacerations, tenderness or bony tenderness. Normal range of motion. Normal strength. Normal sensation. There is no disruption of two-point discrimination. Normal capillary refill. Normal pulse. Cervical back: Normal, normal range of motion and neck supple.  No swelling, edema, deformity, erythema, signs of trauma, lacerations, rigidity, spasms, torticollis, tenderness, bony tenderness or crepitus. No pain with movement. Normal range of motion. Thoracic back: Normal. No swelling, edema, deformity, signs of trauma, lacerations, spasms, tenderness or bony tenderness. Normal range of motion. No scoliosis. Lumbar back: Normal. No swelling, edema, deformity, signs of trauma, lacerations, spasms, tenderness or bony tenderness. Normal range of motion. Negative right straight leg raise test and negative left straight leg raise test. No scoliosis. Right lower leg: No edema. Left lower leg: No edema. Comments: Neurovascularly intact. No bony tenderness noted to the cervical, thoracic or lumbar spine. No step-off deformity. No skin changes color streaking or ecchymosis noted. Patient was removed off the backboard. Skin:     General: Skin is warm and dry. Capillary Refill: Capillary refill takes less than 2 seconds. Neurological:      General: No focal deficit present. Mental Status: She is alert and oriented to person, place, and time. GCS: GCS eye subscore is 4. GCS verbal subscore is 5. GCS motor subscore is 6. Psychiatric:         Behavior: Behavior normal. Behavior is cooperative.          DIAGNOSTIC RESULTS   LABS:    Labs Reviewed   COMPREHENSIVE METABOLIC PANEL W/ REFLEX TO MG FOR LOW K - Abnormal; Notable for the following components:       Result Value    Glucose 122 (*)     BUN 23 (*)     All other components within normal limits    Narrative:     Performed at:  [facility]) - [address]   Phone [phone]   POCT GLUCOSE - Abnormal; Notable for the following components:    POC Glucose 109 (*)     All other components within normal limits    Narrative:     Performed at:  [place] [address],  [facility]   Phone [phone], RAPID    Narrative:     Performed at:  [facility]  [facility],  [place], [facility]   Phone [phone]   CBC WITH AUTO DIFFERENTIAL    Narrative:     Performed at:  [facility],  [place], [facility]   Phone [phone]   TROPONIN    Narrative:     Performed at:  [facility],  [place], [facility]   Phone [phone]   BRAIN NATRIURETIC PEPTIDE    Narrative:     Performed at:  [facility]) [facility],  [place], [facility]   Phone [phone]       When ordered only abnormal lab results are displayed. All other labs were within normal range or not returned as of this dictation. EKG: When ordered, EKG's are interpreted by the Emergency Department Physician in the absence of a cardiologist.  Please see their note for interpretation of EKG. RADIOLOGY:   Non-plain film images such as CT, Ultrasound and MRI are read by the radiologist. Plain radiographic images are visualized and preliminarily interpreted by the ED Provider with the below findings:        Interpretation per the Radiologist below, if available at the time of this note:    XR CHEST PORTABLE   Final Result   No radiographic evidence of acute pulmonary disease. Right anterior shoulder dislocation. XR HUMERUS RIGHT (MIN 2 VIEWS)   Final Result   Anterior shoulder dislocation. Displaced fracture of the greater tuberosity. XR SHOULDER RIGHT (MIN 2 VIEWS)   Final Result   Anterior shoulder dislocation. Displaced fracture of the greater tuberosity. XR SHOULDER RIGHT (MIN 2 VIEWS)    (Results Pending)     No results found.         PROCEDURES   Unless otherwise noted below, none     Procedural sedation    Date/Time: 7/19/2021 11:55 PM  Performed by: Niharika Atkins PA-C  Authorized by: Dav Lua MD     Consent:     Consent obtained:  Verbal    Consent given by:  Patient    Risks discussed: Allergic reaction, prolonged hypoxia resulting in organ damage, prolonged sedation necessitating reversal, respiratory compromise necessitating ventilatory assistance and intubation, vomiting, dysrhythmia, inadequate sedation and nausea    Alternatives discussed:  Regional anesthesia  Indications:     Procedure performed:  Dislocation reduction    Procedure necessitating sedation performed by:  Physician performing sedation    Intended level of sedation:  Moderate (conscious sedation)  Pre-sedation assessment:     NPO status caution: unable to specify NPO status      ASA classification: class 2 - patient with mild systemic disease      Neck mobility: normal      Mouth opening:  3 or more finger widths    Thyromental distance:  4 finger widths    Mallampati score:  I - soft palate, uvula, fauces, pillars visible    Pre-sedation assessments completed and reviewed: airway patency, anesthesia/sedation history, cardiovascular function, hydration status, mental status, nausea/vomiting, pain level, respiratory function and temperature      History of difficult intubation: no      Pre-sedation assessment completed:  7/19/2021 10:30 PM  Immediate pre-procedure details:     Reassessment: Patient reassessed immediately prior to procedure      Reviewed: vital signs and relevant labs/tests      Verified: bag valve mask available, emergency equipment available, IV patency confirmed, oxygen available and suction available    Procedure details (see MAR for exact dosages):     Sedation start time:  7/19/2021 10:30 PM    Preoxygenation:  Nasal cannula    Sedation:  Propofol    Analgesia:  None    Intra-procedure monitoring:  Blood pressure monitoring, cardiac monitor, continuous capnometry, continuous pulse oximetry, frequent LOC assessments and frequent vital sign checks    Intra-procedure events: none      Sedation end time:  7/19/2021 10:45 PM    Total sedation time (minutes):  7  Post-procedure details:     Post-sedation assessment completed:  7/19/2021 10:45 PM    Attendance: Constant attendance by certified staff until patient recovered      Recovery: Patient returned to pre-procedure baseline      Estimated blood loss (see I/O flowsheets): no      Complications:  None    Post-sedation assessments completed and reviewed: airway patency, cardiovascular function, hydration status, mental status, nausea/vomiting, pain level, respiratory function and temperature      Patient is stable for discharge or admission: yes      Patient tolerance: Tolerated well, no immediate complications        CRITICAL CARE TIME   N/A    CONSULTS:  IP CONSULT TO HOSPITALIST      EMERGENCY DEPARTMENT COURSE and DIFFERENTIAL DIAGNOSIS/MDM:   Vitals:    Vitals:    07/19/21 2221 07/19/21 2222 07/19/21 2230 07/19/21 2311   BP:   (!) 156/76 (!) 130/93   Pulse:  76 66 60   Resp:  16 23 21   Temp:       TempSrc:       SpO2: 98% 97% 96% 94%   Weight:       Height:           Patient was given the following medications:  Medications   ondansetron (ZOFRAN) injection 4 mg (4 mg Intravenous Given 7/19/21 2026)   HYDROcodone-acetaminophen (NORCO) 5-325 MG per tablet 1 tablet (1 tablet Oral Given 7/19/21 1940)   morphine sulfate (PF) injection 4 mg (4 mg Intravenous Given 7/19/21 2126)   propofol injection 70 mg (0 mg Intravenous Stopped 7/19/21 2223)           Patient brought in today by EMS for evaluation of a syncopal episode and right shoulder pain. On exam alert oriented afebrile breathing on room air satting at 94%. Nontoxic. No acute respiratory distress. Old labs and records reviewed. Patient seen and evaluated by myself as well as my attending Dr. Royce Olguin. CBC shows no acute leukocytosis. Hemoglobin of 13.3. Point-of-care glucose of 109. EKG reviewed by my attending see note for dictation. Chest x-ray is unremarkable. Right anterior shoulder dislocation. COVID-19 is negative. No acute electrolyte abnormalities. Kidney function unremarkable. Troponin less than 0.01. BNP unremarkable. Patient also has a displaced fracture of the greater tuberosity. Patient given nausea meds and pain meds. We were able to successfully reduce the right shoulder. Please see procedure note for full details. Plan at this time will be to admit for syncopal work-up as well as orthopedic consult while admitted. Patient was placed in a sling and swath and remained neurovascularly intact. Will consult hospitalist.  Patient was stable at time of admission. FINAL IMPRESSION      1. Anterior dislocation of right shoulder, initial encounter    2. Closed displaced fracture of greater tuberosity of right humerus, initial encounter    3. Syncope and collapse          DISPOSITION/PLAN   DISPOSITION Decision To Admit 07/19/2021 11:50:27 PM      PATIENT REFERRED TO:  No follow-up provider specified.     DISCHARGE MEDICATIONS:  New Prescriptions    No medications on file       DISCONTINUED MEDICATIONS:  Discontinued Medications    No medications on file              (Please note that portions of this note were completed with a voice recognition program.  Efforts were made to edit the dictations but occasionally words are mis-transcribed.)    Kalani Diaz PA-C (electronically signed)            Kalani Diaz PA-C  07/20/21 0000

## 2021-07-20 ENCOUNTER — APPOINTMENT (OUTPATIENT)
Dept: CT IMAGING | Age: 76
DRG: 563 | End: 2021-07-20
Payer: MEDICARE

## 2021-07-20 DIAGNOSIS — R55 SYNCOPE AND COLLAPSE: Primary | ICD-10-CM

## 2021-07-20 LAB
ANION GAP SERPL CALCULATED.3IONS-SCNC: 8 MMOL/L (ref 3–16)
BASOPHILS ABSOLUTE: 0 K/UL (ref 0–0.2)
BASOPHILS RELATIVE PERCENT: 0.3 %
BUN BLDV-MCNC: 20 MG/DL (ref 7–20)
CALCIUM SERPL-MCNC: 8.8 MG/DL (ref 8.3–10.6)
CHLORIDE BLD-SCNC: 99 MMOL/L (ref 99–110)
CO2: 28 MMOL/L (ref 21–32)
CREAT SERPL-MCNC: 0.8 MG/DL (ref 0.6–1.2)
EOSINOPHILS ABSOLUTE: 0 K/UL (ref 0–0.6)
EOSINOPHILS RELATIVE PERCENT: 0.2 %
GFR AFRICAN AMERICAN: >60
GFR NON-AFRICAN AMERICAN: >60
GLUCOSE BLD-MCNC: 129 MG/DL (ref 70–99)
GLUCOSE BLD-MCNC: 139 MG/DL (ref 70–99)
GLUCOSE BLD-MCNC: 175 MG/DL (ref 70–99)
GLUCOSE BLD-MCNC: 85 MG/DL (ref 70–99)
HCT VFR BLD CALC: 39 % (ref 36–48)
HEMOGLOBIN: 13 G/DL (ref 12–16)
LYMPHOCYTES ABSOLUTE: 1.3 K/UL (ref 1–5.1)
LYMPHOCYTES RELATIVE PERCENT: 13.2 %
MCH RBC QN AUTO: 29.5 PG (ref 26–34)
MCHC RBC AUTO-ENTMCNC: 33.3 G/DL (ref 31–36)
MCV RBC AUTO: 88.6 FL (ref 80–100)
MONOCYTES ABSOLUTE: 0.8 K/UL (ref 0–1.3)
MONOCYTES RELATIVE PERCENT: 8.3 %
NEUTROPHILS ABSOLUTE: 7.8 K/UL (ref 1.7–7.7)
NEUTROPHILS RELATIVE PERCENT: 78 %
PDW BLD-RTO: 15.3 % (ref 12.4–15.4)
PERFORMED ON: ABNORMAL
PERFORMED ON: ABNORMAL
PERFORMED ON: NORMAL
PLATELET # BLD: 220 K/UL (ref 135–450)
PMV BLD AUTO: 8.2 FL (ref 5–10.5)
POTASSIUM REFLEX MAGNESIUM: 4.2 MMOL/L (ref 3.5–5.1)
RBC # BLD: 4.4 M/UL (ref 4–5.2)
SODIUM BLD-SCNC: 135 MMOL/L (ref 136–145)
TROPONIN: <0.01 NG/ML
TROPONIN: <0.01 NG/ML
WBC # BLD: 10 K/UL (ref 4–11)

## 2021-07-20 PROCEDURE — 2580000003 HC RX 258: Performed by: INTERNAL MEDICINE

## 2021-07-20 PROCEDURE — 85025 COMPLETE CBC W/AUTO DIFF WBC: CPT

## 2021-07-20 PROCEDURE — 73200 CT UPPER EXTREMITY W/O DYE: CPT

## 2021-07-20 PROCEDURE — 2060000000 HC ICU INTERMEDIATE R&B

## 2021-07-20 PROCEDURE — 80048 BASIC METABOLIC PNL TOTAL CA: CPT

## 2021-07-20 PROCEDURE — 97530 THERAPEUTIC ACTIVITIES: CPT

## 2021-07-20 PROCEDURE — 6360000002 HC RX W HCPCS: Performed by: INTERNAL MEDICINE

## 2021-07-20 PROCEDURE — 93228 REMOTE 30 DAY ECG REV/REPORT: CPT | Performed by: INTERNAL MEDICINE

## 2021-07-20 PROCEDURE — 36415 COLL VENOUS BLD VENIPUNCTURE: CPT

## 2021-07-20 PROCEDURE — 72125 CT NECK SPINE W/O DYE: CPT

## 2021-07-20 PROCEDURE — 84484 ASSAY OF TROPONIN QUANT: CPT

## 2021-07-20 PROCEDURE — 97162 PT EVAL MOD COMPLEX 30 MIN: CPT

## 2021-07-20 PROCEDURE — 6370000000 HC RX 637 (ALT 250 FOR IP): Performed by: INTERNAL MEDICINE

## 2021-07-20 PROCEDURE — 96361 HYDRATE IV INFUSION ADD-ON: CPT

## 2021-07-20 PROCEDURE — 99222 1ST HOSP IP/OBS MODERATE 55: CPT | Performed by: INTERNAL MEDICINE

## 2021-07-20 PROCEDURE — 70450 CT HEAD/BRAIN W/O DYE: CPT

## 2021-07-20 PROCEDURE — 97165 OT EVAL LOW COMPLEX 30 MIN: CPT

## 2021-07-20 PROCEDURE — 96372 THER/PROPH/DIAG INJ SC/IM: CPT

## 2021-07-20 RX ORDER — ASPIRIN 81 MG/1
81 TABLET ORAL DAILY
Status: DISCONTINUED | OUTPATIENT
Start: 2021-07-20 | End: 2021-07-21 | Stop reason: HOSPADM

## 2021-07-20 RX ORDER — PRAVASTATIN SODIUM 40 MG
40 TABLET ORAL DAILY
Status: DISCONTINUED | OUTPATIENT
Start: 2021-07-20 | End: 2021-07-21 | Stop reason: HOSPADM

## 2021-07-20 RX ORDER — AMLODIPINE BESYLATE 5 MG/1
5 TABLET ORAL DAILY
Status: DISCONTINUED | OUTPATIENT
Start: 2021-07-21 | End: 2021-07-21 | Stop reason: HOSPADM

## 2021-07-20 RX ORDER — POTASSIUM CHLORIDE 7.45 MG/ML
10 INJECTION INTRAVENOUS PRN
Status: DISCONTINUED | OUTPATIENT
Start: 2021-07-20 | End: 2021-07-21 | Stop reason: HOSPADM

## 2021-07-20 RX ORDER — DILTIAZEM HYDROCHLORIDE 180 MG/1
180 CAPSULE, COATED, EXTENDED RELEASE ORAL DAILY
Status: DISCONTINUED | OUTPATIENT
Start: 2021-07-20 | End: 2021-07-20

## 2021-07-20 RX ORDER — CALCIUM CARBONATE 500(1250)
500 TABLET ORAL 2 TIMES DAILY
Status: DISCONTINUED | OUTPATIENT
Start: 2021-07-20 | End: 2021-07-21 | Stop reason: HOSPADM

## 2021-07-20 RX ORDER — ONDANSETRON 2 MG/ML
4 INJECTION INTRAMUSCULAR; INTRAVENOUS EVERY 6 HOURS PRN
Status: DISCONTINUED | OUTPATIENT
Start: 2021-07-20 | End: 2021-07-21 | Stop reason: HOSPADM

## 2021-07-20 RX ORDER — SODIUM CHLORIDE 0.9 % (FLUSH) 0.9 %
5-40 SYRINGE (ML) INJECTION EVERY 12 HOURS SCHEDULED
Status: DISCONTINUED | OUTPATIENT
Start: 2021-07-20 | End: 2021-07-21 | Stop reason: HOSPADM

## 2021-07-20 RX ORDER — BUSPIRONE HYDROCHLORIDE 10 MG/1
10 TABLET ORAL 2 TIMES DAILY
Status: DISCONTINUED | OUTPATIENT
Start: 2021-07-20 | End: 2021-07-21 | Stop reason: HOSPADM

## 2021-07-20 RX ORDER — ACETAMINOPHEN 325 MG/1
650 TABLET ORAL EVERY 6 HOURS PRN
Status: DISCONTINUED | OUTPATIENT
Start: 2021-07-20 | End: 2021-07-21 | Stop reason: HOSPADM

## 2021-07-20 RX ORDER — RISPERIDONE 1 MG/1
1 TABLET, FILM COATED ORAL NIGHTLY
Status: DISCONTINUED | OUTPATIENT
Start: 2021-07-21 | End: 2021-07-20 | Stop reason: DRUGHIGH

## 2021-07-20 RX ORDER — VITAMIN B COMPLEX
1000 TABLET ORAL 2 TIMES DAILY
Status: DISCONTINUED | OUTPATIENT
Start: 2021-07-20 | End: 2021-07-21 | Stop reason: HOSPADM

## 2021-07-20 RX ORDER — SODIUM CHLORIDE 9 MG/ML
25 INJECTION, SOLUTION INTRAVENOUS PRN
Status: DISCONTINUED | OUTPATIENT
Start: 2021-07-20 | End: 2021-07-21 | Stop reason: HOSPADM

## 2021-07-20 RX ORDER — POLYETHYLENE GLYCOL 3350 17 G/17G
17 POWDER, FOR SOLUTION ORAL DAILY PRN
Status: DISCONTINUED | OUTPATIENT
Start: 2021-07-20 | End: 2021-07-21 | Stop reason: HOSPADM

## 2021-07-20 RX ORDER — ONDANSETRON 4 MG/1
4 TABLET, ORALLY DISINTEGRATING ORAL EVERY 8 HOURS PRN
Status: DISCONTINUED | OUTPATIENT
Start: 2021-07-20 | End: 2021-07-21 | Stop reason: HOSPADM

## 2021-07-20 RX ORDER — AMLODIPINE BESYLATE 5 MG/1
10 TABLET ORAL DAILY
Status: DISCONTINUED | OUTPATIENT
Start: 2021-07-20 | End: 2021-07-20

## 2021-07-20 RX ORDER — RISPERIDONE 1 MG/1
1 TABLET, FILM COATED ORAL NIGHTLY
Status: DISCONTINUED | OUTPATIENT
Start: 2021-07-20 | End: 2021-07-21 | Stop reason: HOSPADM

## 2021-07-20 RX ORDER — RISPERIDONE 1 MG/1
2 TABLET, FILM COATED ORAL DAILY
Status: DISCONTINUED | OUTPATIENT
Start: 2021-07-20 | End: 2021-07-20

## 2021-07-20 RX ORDER — VALSARTAN 80 MG/1
160 TABLET ORAL DAILY
Status: DISCONTINUED | OUTPATIENT
Start: 2021-07-20 | End: 2021-07-21 | Stop reason: HOSPADM

## 2021-07-20 RX ORDER — SODIUM CHLORIDE 0.9 % (FLUSH) 0.9 %
5-40 SYRINGE (ML) INJECTION PRN
Status: DISCONTINUED | OUTPATIENT
Start: 2021-07-20 | End: 2021-07-21 | Stop reason: HOSPADM

## 2021-07-20 RX ORDER — MAGNESIUM SULFATE 1 G/100ML
1000 INJECTION INTRAVENOUS PRN
Status: DISCONTINUED | OUTPATIENT
Start: 2021-07-20 | End: 2021-07-21 | Stop reason: HOSPADM

## 2021-07-20 RX ORDER — HYDROCODONE BITARTRATE AND ACETAMINOPHEN 5; 325 MG/1; MG/1
1 TABLET ORAL EVERY 4 HOURS PRN
Status: DISCONTINUED | OUTPATIENT
Start: 2021-07-20 | End: 2021-07-21 | Stop reason: HOSPADM

## 2021-07-20 RX ORDER — POTASSIUM CHLORIDE 20 MEQ/1
40 TABLET, EXTENDED RELEASE ORAL PRN
Status: DISCONTINUED | OUTPATIENT
Start: 2021-07-20 | End: 2021-07-21 | Stop reason: HOSPADM

## 2021-07-20 RX ORDER — ACETAMINOPHEN 650 MG/1
650 SUPPOSITORY RECTAL EVERY 6 HOURS PRN
Status: DISCONTINUED | OUTPATIENT
Start: 2021-07-20 | End: 2021-07-21 | Stop reason: HOSPADM

## 2021-07-20 RX ORDER — SODIUM CHLORIDE 9 MG/ML
INJECTION, SOLUTION INTRAVENOUS CONTINUOUS
Status: DISCONTINUED | OUTPATIENT
Start: 2021-07-20 | End: 2021-07-20

## 2021-07-20 RX ADMIN — RISPERIDONE 1 MG: 1 TABLET ORAL at 21:21

## 2021-07-20 RX ADMIN — Medication 1000 UNITS: at 11:10

## 2021-07-20 RX ADMIN — PRAVASTATIN SODIUM 40 MG: 40 TABLET ORAL at 11:10

## 2021-07-20 RX ADMIN — ASPIRIN 81 MG: 81 TABLET, COATED ORAL at 11:10

## 2021-07-20 RX ADMIN — VALSARTAN 160 MG: 80 TABLET, FILM COATED ORAL at 11:10

## 2021-07-20 RX ADMIN — ENOXAPARIN SODIUM 40 MG: 40 INJECTION SUBCUTANEOUS at 11:13

## 2021-07-20 RX ADMIN — BUSPIRONE HYDROCHLORIDE 10 MG: 10 TABLET ORAL at 20:56

## 2021-07-20 RX ADMIN — Medication 1000 UNITS: at 20:55

## 2021-07-20 RX ADMIN — ACETAMINOPHEN 650 MG: 325 TABLET ORAL at 21:21

## 2021-07-20 RX ADMIN — CALCIUM 500 MG: 500 TABLET ORAL at 11:16

## 2021-07-20 RX ADMIN — ACETAMINOPHEN 650 MG: 325 TABLET ORAL at 15:19

## 2021-07-20 RX ADMIN — BUSPIRONE HYDROCHLORIDE 10 MG: 10 TABLET ORAL at 11:10

## 2021-07-20 RX ADMIN — CALCIUM 500 MG: 500 TABLET ORAL at 20:55

## 2021-07-20 RX ADMIN — SODIUM CHLORIDE: 9 INJECTION, SOLUTION INTRAVENOUS at 03:51

## 2021-07-20 RX ADMIN — Medication 10 ML: at 20:58

## 2021-07-20 RX ADMIN — HYDROCODONE BITARTRATE AND ACETAMINOPHEN 1 TABLET: 5; 325 TABLET ORAL at 11:09

## 2021-07-20 ASSESSMENT — PAIN SCALES - GENERAL
PAINLEVEL_OUTOF10: 3
PAINLEVEL_OUTOF10: 7
PAINLEVEL_OUTOF10: 9
PAINLEVEL_OUTOF10: 5
PAINLEVEL_OUTOF10: 3
PAINLEVEL_OUTOF10: 2
PAINLEVEL_OUTOF10: 0
PAINLEVEL_OUTOF10: 0

## 2021-07-20 NOTE — PROGRESS NOTES
Physical Therapy/Occupational Therapy    Hold PT/OT evaluation at this time due to pending ortho c/s for R humerus fx. Will continue to follow-up as appropriate. No charge.     Son Hilton, PT, DPT #[id number]  Kathy De La Garza, OTR/L #[id number]

## 2021-07-20 NOTE — PROGRESS NOTES
Pt returned to unit in stable condition via transportation, call light within reach bed in lowest position.  Ousmane Morales RN

## 2021-07-20 NOTE — ED NOTES
1066-- in room for conscious sedation. , with iMcaela Jung MD, Pascale HERRERA and resident.      Consent obtained from Pt and family in room     ETCO2 for duration of procedure 39          Pt placed on Oxygen 2 lpm for procedure         Alyssa Meneses, RN  07/19/21 [address], RN  07/19/21 1249

## 2021-07-20 NOTE — PROGRESS NOTES
Inpatient Physical Therapy Evaluation and Treatment    Unit: PCU  Date:  2021  Patient Name:    Ildefonso Mauricio  Admitting diagnosis:  Syncope and collapse [R55]  Admit Date:  2021  Precautions/Restrictions/WB Status/ Lines/ Wounds/ Oxygen: Fall risk, Bed/chair alarm, Lines -IV, Telemetry, WB Restrictions (NWB RUE) and Sling at all times except for hygine RUE    Treatment Time:  [id number]  Treatment Number:  1   Timed Code Treatment Minutes: 18 minutes  Total Treatment Minutes:  28  minutes    Patient Goals for Therapy: \" Go home \"          Discharge Recommendations: Home 24 hr assist  and Home PT  DME needs for discharge: Needs Met       Therapy recommendation for EMS Transport: can transport by wheelchair    Therapy recommendations for staff:   Assist of 1 with use of gait belt for all transfers to/from [facility]  to/from chair    History of Present Illness: 76 y.o. female presenting to [facility] 2021 following fall. Pt found with displaced Fx of the R humerus' greater tuberosity and anterior dislocation of the R shoulder, reduced in ED. Per ortho 21 \"Recommend sling for RUE at all times, may remove for hygiene; NWB RUE; pain control and ice\"    Home Health S4 Level Recommendation:  Level 1 Standard  AM-PAC Mobility Score    AM-PAC Inpatient Mobility Raw Score : 19       Preadmission Environment    Pt. Lives with spouse and  Assist Available   Home environment:    one story home  Steps to enter first floor:   ramp            Bathroom:       Walk-in Shower, Grab bars, Shower Chair  and grab bars aroud toilet  Equipment [facility]:      [facility] and [facility]      Preadmission Status / PLOF:  History of falls             Yes (lost balance)  Pt. Able to drive          Yes  Pt Fully independent with ADL's         Yes  Pt. Required assistance from family for: Independent PTA    Pt.  Fully independent for transfers and gait and walked with: No Device    Pain   Yes  Location: R shoulder  Ratin /10  Pain Medicine Status: No request made    Cognition    A&O x4   Able to follow 2 step commands    Subjective  Patient lying supine in bed with family at bedside. (spouse and dtr)  Pt agreeable to this PT eval & tx. Upper Extremity ROM/Strength  Please see OT evaluation. Lower Extremity ROM / Strength   AROM WFL: Yes  ROM limitations:     Strength Assessment (measured on a 0-5 scale):  R LE   Quad   5   Hamstring 5  L LE  Quad   5   Hamstring 5     Lower Extremity Sensation    WFL    Lower Extremity Proprioception:   WNL    Coordination and Tone  WFL    Balance  Sitting:  Good ; Supervision  Comments:     Standing: Good - ; SBA  Comments:     Bed Mobility   Supine to Sit:    SBA with HOB elevated  Sit to Supine:   Not Tested  Rolling:   Not Tested  Scooting in sitting: SBA  Scooting in supine:  Not Tested    Transfer Training     Sit to stand:   SBA  Stand to sit:   SBA  Bed to Chair:   SBA with use of No AD    Gait gait completed as indicated below  Distance:      3 ft  Deviations (firm surface/linoleum):  decreased luis armando and increased NELLY  Assistive Device Used:    No AD  Level of Assist:    SBA  Comment:     Stair Training deferred, pt does not have stairs in home environment    Activity Tolerance   Pt completed therapy session with Nausea noted with sitting up, RN notified    SpO2: 98% on 2L O2 supine at rest   94-96% on RA with mobility; left off at end of evaluation (RN notified)  HR: 56-70 bpm    Positioning Needs   Pt up in chair, alarm set, positioned in proper neutral alignment and pressure relief provided. Call light provided and all needs within reach    Exercises Initiated  all completed bilaterally unless indicated  Ankle Pumps x 10 reps    Other  None. Patient/Family Education   Pt educated on role of inpatient PT, POC, importance of continued activity, DC recommendations, safety awareness, transfer techniques, HEP and calling for assist with mobility.     Assessment  Pt seen for Physical Therapy evaluation in acute care setting. Pt demonstrated decreased Activity tolerance, Balance, ROM, Safety and Strength as well as decreased independence with Ambulation, Bed Mobility  and Transfers. Recommending Home 24 hr assist and with home PT upon discharge as patient functioning close to baseline level and would benefit from continued therapy services to safely progress balance and IND with functional mobility. Goals : To be met in 3 visits:  1). Independent with LE Ex x 10 reps    To be met in 6 visits:  1). Supine to/from sit: Independent  2). Sit to/from stand: Independent  3). Bed to chair: Supervision  4). Gait: Ambulate 75 ft.   with  Supervision and use of LRAD (least restrictive assistive device)  5). Tolerate B LE exercises 3 sets of 10-15 reps    Rehabilitation Potential: Good  Strengths for achieving goals include:   PLOF, Family Support and Pt cooperative   Barriers to achieving goals include:    Pain    Plan    To be seen 3-5 x / week  while in acute care setting for therapeutic exercises, bed mobility, transfers, progressive gait training, balance training, and family/patient education. Signature: Sterling Jerome PT, DPT #[id number]    If patient discharges from this facility prior to next visit, this note will serve as the Discharge Summary.

## 2021-07-20 NOTE — PROGRESS NOTES
Inpatient Occupational Therapy  Evaluation and Treatment    Unit: PCU  Date:  2021  Patient Name:    Reid Marley  Admitting diagnosis:  Syncope and collapse [R55]  Admit Date:  2021  Precautions/Restrictions/WB Status/ Lines/ Wounds/ Oxygen: Fall risk, Bed/chair alarm, Lines -IV, Telemetry, WB Restrictions (NWB RUE) and Sling at all times except for hygine RUE    Treatment Time:  11:48-12:20  Treatment Number: 1     Billable Treatment Time: 22 minutes   Total Treatment Time:   32  minutes    Patient Goals for Therapy:  \" go home \"      Discharge Recommendations: Home with  assist and home therapy  DME needs for discharge: Needs Met       Therapy recommendations for staff:   Assist of 1 with use of No AD for all transfers to/from BSC/chair    History of Present Illness: 76 y.o. female presenting to [facility] following fall. Pt found with displaced Fx of the R humerus' greater tuberosity and anterior dislocation of the R shoulder, reduced in ED. Per ortho 21 \"Recommend sling for RUE at all times, may remove for hygiene; NWB RUE; pain control and ice\"     Home Health S4 Level Recommendation:  Level 1 Standard  AM-PAC Score: AM-PAC Inpatient Daily Activity Raw Score: 13    Preadmission Environment    Pt. Lives with spouse and  Assist Available   Home environment:  one story home  Steps to enter first floor:   ramp    Bathroom:  Walk-in Shower, Grab bars, Shower Chair  and grab bars aroud toilet  Equipment owned:  [facility] and [facility]     Preadmission Status / PLOF:  History of falls   Yes (lost balance)  Pt. Able to drive   Yes  Pt Fully independent with ADL's  Yes  Pt. Required assistance from family for: Independent PTA    Pt.  Fully independent for transfers and gait and walked with: No Device    Pain  Yes  Ratin  Location: R shoulder  Pain Medicine Status: Denies need      Cognition    A&O x4   Able to follow 2 step commands    Subjective  Patient lying supine in bed with  present  Pt agreeable to this OT eval & tx. Upper Extremity ROM:    L UE WFLs. R UE limited due to ROM precautions     Upper Extremity Strength:    BUE strength impaired but not formally assessed w/ MMT    Upper Extremity Sensation    WFL    Upper Extremity Proprioception:  WFL    Coordination and Tone  Impaired    Balance  Functional Sitting Balance:  WFL  Functional Standing Balance:Diminished (SBA provided)     Bed mobility:    Supine to sit:   SBA with HOB raised  Sit to supine:   Not Tested  Rolling:    Not Tested  Scooting in sitting:  SBA  Scooting to head of bed:   Not Tested    Bridging:   Not Tested    Transfers:    Sit to stand:  SBA  Stand to sit:  SBA  Bed to chair:   SBA  Standard toilet: Not Tested  Bed to [facility]:  Not Tested    Dressing:      UE:   Not Tested  LE:    Not Tested    Bathing:    UE:  Not Tested  LE:  Not Tested    Eating:   Not Tested    Toileting:  Not Tested    Activity Tolerance   Pt completed therapy session with No adverse symptoms noted w/activity  SpO2: 98% on 2L O2 supine at rest              94-96% on RA with mobility; left off at end of evaluation (RN notified)  HR: 56-70 bpm    Positioning Needs:   Up in chair, call light and needs in reach. Alarm Set    Exercise / Activities Initiated:   N/A    Patient/Family Education:   Role of OT  Recommendations for DC    Assessment of Deficits: Pt seen for Occupational therapy evaluation in acute care setting. Pt demonstrated decreased Activity tolerance, ADLs, IADLs, Balance , Bathing, Bed mobility, Dressing, ROM, Safety Awareness, Strength, Transfers and Coping Skills. Pt functioning below baseline and will likely benefit from skilled occupational therapy services to maximize safety and independence. Goal(s) : To be met in 3 Visits:  1). Bed to toilet/BSC: Supervision    To be met in 5 Visits:  1). Supine to/from Sit:  Supervision  2). Upper Body Bathing:   Min A  3). Lower Body Bathing:   Min A  4).  Upper Body Dressing:  Min A  5). Lower Body Dressing:  Min A  6). Pt to demonstrate UE exs x 15 reps with minimal cues    Rehabilitation Potential:  Good for goals listed above. Strengths for achieving goals include: Pt motivated, PLOF and Pt cooperative  Barriers to achieving goals include:  Complexity of condition     Plan: To be seen 3-5 x/wk while in acute care setting for therapeutic exercises, bed mobility, transfers, dressing, bathing, family/patient education, ADL/IADL retraining, energy conservation training.        Dottie Zavala, OTR/L #[id number]      If patient discharges from this facility prior to next visit, this note will serve as the Discharge Summary

## 2021-07-20 NOTE — ACP (ADVANCE CARE PLANNING)
Advance Care Planning   Healthcare Decision Maker:    Primary Decision Maker: Niles Mar - [facility] - [phone]    Click here to complete Healthcare Decision Makers including selection of the Healthcare Decision Maker Relationship (ie \"Primary\").

## 2021-07-20 NOTE — ED NOTES
2233- Perfectserve sent to Dr Bernardo Acevedo  07/19/21 2233    Felicita Bis- Dr Lamin Chiang [facility] PA and Dr Sandra Monaco  07/20/21 0020

## 2021-07-20 NOTE — H&P
Hospital Medicine History & Physical      PCP: Bay Bell MD    Date of Service: Pt seen/examined on 7/20/21 and admitted on 7/20/21 to Inpatient    Chief Complaint   Patient presents with    Fall     Pt arrived via EMS, two falls this week. C/o pain in right arm post fall, splint applied via EMS. Zofran PTA. History Of Present Illness: The patient is a 76 y.o. female with PMH below, presents with fall today and multiple near falls in last week in last week, R shoulder/arm pain. Pt has reportedly fell today while working in her kitchen. She denies LOC or hitting head. She says she lost her balance and fell landing on her R shoulder. She remembers hitting the floor. She reports that she might have felt a little lightheaded immediately prior to her fall. She is really not able to tell me what exactly is causing her to lose her balance. ED initially reported that she has fallen multiple times previously this week, however, pt says she did lose her balance a couple of times earlier in the week but did not actually fall. She or someone near her was able to catch her to prevent actually falling. Since her fall immediately PTA she has had moderate to severe pain in her R shoulder/upper arm. Exac by attempted movement of RUE. She was found to have a displaced Fx of the R humerus' greater tuberosity and anterior dislocation of the R shoulder. Shoulder was reduced in ER. Pt currently in sling. No CP, abd pain, SOB/MACKEY.      Past Medical History:        Diagnosis Date    Hyperlipidemia     Hyperlipidemia associated with type 2 diabetes mellitus ([address] 10/5/2015    Hypertension     Morbid obesity with BMI of 40.0-44.9, adult ([address] 10/5/2015    Osteoarthrosis, not specified whether generalized/localized, lower leg     Paranoid type schizophrenia ([address]     Type 2 diabetes mellitus without complication ([address] 10/2/0834    Type II or unspecified type diabetes mellitus without mention of complication, not stated as uncontrolled        Past Surgical History:        Procedure Laterality Date    BLEPHAROPLASTY  5/10/2012    COLONOSCOPY  8/1/2007    JOINT REPLACEMENT         Medications Prior to Admission:    Prior to Admission medications    Medication Sig Start Date End Date Taking? Authorizing Provider   dilTIAZem (CARDIZEM CD) 360 MG extended release capsule TAKE 1 CAPSULE BY MOUTH  ONCE DAILY 7/15/21   Oswaldo Taylor MD   pioglitazone (ACTOS) 30 MG tablet TAKE 1 TABLET BY MOUTH ONCE DAILY 7/15/21   Oswaldo Taylor MD   pravastatin (PRAVACHOL) 40 MG tablet TAKE 1 TABLET BY MOUTH ONCE DAILY 7/15/21   Oswaldo Taylor MD   glimepiride (AMARYL) 4 MG tablet TAKE 1 TABLET BY MOUTH ONCE DAILY IN THE MORNING BEFORE BREAKFAST 7/15/21   Oswaldo Taylor MD   hydroCHLOROthiazide (HYDRODIURIL) 25 MG tablet TAKE 1 TABLET BY MOUTH EVERY DAY 7/8/21   Oswaldo Taylor MD   valsartan (DIOVAN) 160 MG tablet TAKE 1 TABLET BY MOUTH  DAILY 5/13/21   Oswaldo Taylor MD   Accu-Chek Softclix Lancets MISC USE TO TEST TWICE A DAY. DX:E11.9 3/30/21   Oswaldo Taylor MD   blood glucose test strips (ACCU-CHEK NEY PLUS) strip USE TO TEST TWICE A DAY. DX: E11.9 3/12/21   Oswaldo Taylor MD   valsartan-hydroCHLOROthiazide (DIOVAN-HCT) 160-25 MG per tablet Take 1 tablet by mouth daily    Historical Provider, MD   Calcium Carb-Cholecalciferol (CALCIUM PLUS VITAMIN D3) 600-800 MG-UNIT TABS Take by mouth 2 times daily    Historical Provider, MD   Wheat Dextrin (BENEFIBER PO) Take by mouth as needed    Historical Provider, MD   acetaminophen (TYLENOL) 325 MG tablet Take 325 mg by mouth as needed for Pain    Historical Provider, MD   busPIRone (BUSPAR) 10 MG tablet Take 10 mg by mouth 2 times daily    Historical Provider, MD   Blood Glucose Monitoring Suppl (ACCU-CHEK NEY PLUS) w/Device KIT Check twice daily.  DX: E11.9 1/22/18   sOwaldo Taylor MD Lancets MISC One every day 5/1/12   Junie England MD   aspirin 81 MG EC tablet Take 81 mg by mouth daily. Historical Provider, MD   risperidone (RISPERDAL) 2 MG tablet Take 2 mg by mouth daily. Historical Provider, MD       Allergies:  Tessalon [benzonatate], Diazepam, Diclofenac sodium, Lycopene, Metformin, Olanzapine, Tape [adhesive tape], and Zocor [simvastatin - high dose]    Social History:    TOBACCO:   reports that she has never smoked. She has never used smokeless tobacco.  ETOH:   reports no history of alcohol use. Family History:  Reviewed in detail and negative for DM, Early CAD, Cancer (except as below). Positive as follows:        Problem Relation Age of Onset    Cancer Sister         Ovarian cancer    Dementia Brother        REVIEW OF SYSTEMS:   Pertinent positives/negatives as follows: fall today and multiple near falls in last week in last week, R shoulder/arm pain, and as discussed in HPI, otherwise a complete ROS performed and all other systems are negative  PHYSICAL EXAM PERFORMED:    BP (!) 150/87   Pulse 63   Temp 98.7 °F (37.1 °C) (Oral)   Resp 16   Ht 5' 1\" (1.549 m)   Wt 230 lb (104.3 kg)   SpO2 99%   BMI 43.46 kg/m²     GEN:  A&Ox3, NAD. HEENT:  NC/AT,EOMI, tacky MM, no erythema/exudates or visible masses. CVS:  Normal S1,S2. RRR. Without M/G/R.   LUNG:   CTA-B. no wheezes, rales or rhonchi  ABD:  Soft, ND/NT, BS+ x4. Without G/R.  EXT: 2+ pulses, no c/c/e. R shoulder/upper arm ttp. Limited ROM RUE and currently in sling. Neurovasc intact distally. Brisk cap refill. PSY:  Thought process intact, affect appropriate. LESLEE:  CN III-XII intact, moves all 4 spontaneously, sensory grossly intact. SKIN: No rash or lesions on visible skin. Chart review shows recent radiographs:  XR SHOULDER RIGHT (MIN 2 VIEWS)    Result Date: 7/20/2021  EXAMINATION: 2 XRAY VIEWS OF THE RIGHT SHOULDER 7/19/2021 11:14 pm COMPARISON: Right shoulder x-ray same day. HISTORY: ORDERING SYSTEM PROVIDED HISTORY: Injury TECHNOLOGIST PROVIDED HISTORY: Reason for exam:->Injury Reason for Exam: post reduction Acuity: Acute Type of Exam: Subsequent/Follow-up FINDINGS: Successful reduction of previously seen dislocation. No dislocation given suboptimal scapular Y-view. Greater tuberosity fracture again noted. Successful reduction of previously seen dislocation. XR SHOULDER RIGHT (MIN 2 VIEWS)    Result Date: 7/19/2021  EXAMINATION: THREE XRAY VIEWS OF THE RIGHT SHOULDER; TWO XRAY VIEWS OF THE RIGHT HUMERUS 7/19/2021 7:40 pm COMPARISON: None. HISTORY: ORDERING SYSTEM PROVIDED HISTORY: pain TECHNOLOGIST PROVIDED HISTORY: Reason for exam:->pain Reason for Exam: fall FINDINGS: There is a right anterior shoulder dislocation. There is a fracture of the greater tuberosity. The acromioclavicular joint is intact. The visualized lung is clear. There is decreased bone mineralization. Anterior shoulder dislocation. Displaced fracture of the greater tuberosity. XR HUMERUS RIGHT (MIN 2 VIEWS)    Result Date: 7/19/2021  EXAMINATION: THREE XRAY VIEWS OF THE RIGHT SHOULDER; TWO XRAY VIEWS OF THE RIGHT HUMERUS 7/19/2021 7:40 pm COMPARISON: None. HISTORY: ORDERING SYSTEM PROVIDED HISTORY: pain TECHNOLOGIST PROVIDED HISTORY: Reason for exam:->pain Reason for Exam: fall FINDINGS: There is a right anterior shoulder dislocation. There is a fracture of the greater tuberosity. The acromioclavicular joint is intact. The visualized lung is clear. There is decreased bone mineralization. Anterior shoulder dislocation. Displaced fracture of the greater tuberosity.      CT HEAD WO CONTRAST    Result Date: 7/20/2021  EXAMINATION: CT OF THE HEAD WITHOUT CONTRAST; CT OF THE CERVICAL SPINE WITHOUT CONTRAST 7/20/2021 12:54 am TECHNIQUE: CT of the head was performed without the administration of intravenous contrast. Dose modulation, iterative reconstruction, and/or weight based adjustment of the mA/kV was utilized to reduce the radiation dose to as low as reasonably achievable.; CT of the cervical spine was performed without the administration of intravenous contrast. Multiplanar reformatted images are provided for review. Dose modulation, iterative reconstruction, and/or weight based adjustment of the mA/kV was utilized to reduce the radiation dose to as low as reasonably achievable. COMPARISON: None. HISTORY: ORDERING SYSTEM PROVIDED HISTORY: unwitnessed fall, possibly hit head. Possible syncope. TECHNOLOGIST PROVIDED HISTORY: Reason for exam:->unwitnessed fall, possibly hit head. Possible syncope. Has a \"code stroke\" or \"stroke alert\" been called? ->No Decision Support Exception - unselect if not a suspected or confirmed emergency medical condition->Emergency Medical Condition (MA) Reason for Exam: fall; ORDERING SYSTEM PROVIDED HISTORY: unwitnessed fall, possibly hit head. Possible syncope. TECHNOLOGIST PROVIDED HISTORY: Reason for exam:->unwitnessed fall, possibly hit head. Possible syncope. Decision Support Exception - unselect if not a suspected or confirmed emergency medical condition->Emergency Medical Condition (MA) Reason for Exam: fall dizziy HEAD FINDINGS: BRAIN/VENTRICLES: No acute hemorrhage. Periventricular and subcortical hypoattenuation is nonspecific and may be related to microvascular disease. Chronic lacunar infarcts in the right thalamus and right basal ganglia. Artifact partially obscures the kayla. Artifact partially obscures the inferior cerebellum. Supratentorial gray white differentiation appears maintained given artifact near the skull base. Cerebral volume loss and mild prominence of the ventricles noted. There is no midline shift. Basal cisterns appear patent. ORBITS: Visualized orbits appear unremarkable on this unenhanced exam. SINUSES: Mild mucosal thickening of the right maxillary sinus. Visualized mastoid air cells appear clear.  SOFT TISSUES/SKULL: No depressed HISTORY: Reason for exam:->syncope Reason for Exam: fell FINDINGS: HEART/MEDIASTINUM: The cardiomediastinal silhouette is within normal limits. PLEURA/LUNGS: There are no focal consolidations or pleural effusions. There is no appreciable pneumothorax. BONES/SOFT TISSUE: Right anterior shoulder dislocation noted. No radiographic evidence of acute pulmonary disease. Right anterior shoulder dislocation. EKG:    EKG 12 Lead [[id number]]    Collected: 07/19/21 1943    Updated: 07/19/21 2108     Ventricular Rate 58 BPM    Atrial Rate 58 BPM    P-R Interval 192 ms    QRS Duration 82 ms    Q-T Interval 434 ms    QTc Calculation (Bazett) 426 ms    P Axis 52 degrees    R Axis 42 degrees    T Axis 27 degrees    Diagnosis Sinus bradycardiaLow voltage QRSBorderline ECGWhen compared with ECG of 19-JUL-2021 19:42, (unconfirmed)Premature atrial complexes are no longer PresentConfirmed by Monica Lorenzo MD, [address] ([id number]) on 7/19/2021 9:08:41 PM         CBC:  Recent Labs     07/19/21 1953   WBC 8.8   HGB 13.3   HCT 39.7         RENAL  Recent Labs     07/19/21 1953      K 3.8      CO2 29   BUN 23*   CREATININE 0.9   GLUCOSE 122*     Hemoglobin a1c:  Lab Results   Component Value Date    LABA1C 6.2 01/19/2021    LABA1C 6.4 09/03/2020    LABA1C 6.5 12/10/2019     LFT'S:  Recent Labs     07/19/21 1953   AST 23   ALT 15   BILITOT 0.3   ALKPHOS 85     CARDIAC ENZYMES:   Recent Labs     07/19/21 1953   TROPONINI <0.01     Lab Results   Component Value Date    PROBNP 190 07/19/2021     U/A:  Ordered and pending. PHYSICIAN CERTIFICATION  I certify that Chris Schmidt is expected to be hospitalized for 2 midnights based on the following assessment and plan:    ASSESSMENT/PLAN:  1. Near syncope vs fall, lost balance multiple times in last week but did not actually fall. Think more likely syncope as pt says she does not remember falls just waking up on floor, however, she is a poor historian.   Chk orthostatics q

## 2021-07-20 NOTE — FLOWSHEET NOTE
07/20/21 0114   Vital Signs   Blood Pressure Lying 125/80   Pulse Lying 75 PER MINUTE   Blood Pressure Sitting 136/102   Pulse Sitting 72 PER MINUTE   Blood Pressure Standing 138/90   Pulse Standing 83 PER MINUTE

## 2021-07-20 NOTE — CONSULTS
Department of Orthopedic Surgery  Nurse Practitioner   Consult Note        Reason for Consult:  Right humerus fracture  Requesting Physician: Lucero Casillas MD  Date of Service: 7/20/2021 10:33 AM    CHIEF COMPLAINT:  As Above    History Obtained From:  patient    HISTORY OF PRESENT ILLNESS:                The patient is a 76 y.o. female who presents with above chief complaint. The patient reports she was at home when she felt woozy and fell. She fell onto her right shoulder, it happened very quickly. Her  then called 46 and she was brought here by squad. She lives at home with her , is right handed and does not use any assistive devices. She is retired. , daughter and RN at bedside. Past Medical History:        Diagnosis Date    Hyperlipidemia     Hyperlipidemia associated with type 2 diabetes mellitus ([facility] 10/5/2015    Hypertension     Morbid obesity with BMI of 40.0-44.9, adult ([facility] 10/5/2015    Osteoarthrosis, not specified whether generalized/localized, lower leg     Paranoid type schizophrenia ([facility]     Type 2 diabetes mellitus without complication ([facility] 04/1/5289    Type II or unspecified type diabetes mellitus without mention of complication, not stated as uncontrolled      Past Surgical History:        Procedure Laterality Date    BLEPHAROPLASTY  5/10/2012    COLONOSCOPY  8/1/2007    JOINT REPLACEMENT           Medications Prior to Admission:   Prior to Admission medications    Medication Sig Start Date End Date Taking?  Authorizing Provider   dilTIAZem (CARDIZEM CD) 360 MG extended release capsule TAKE 1 CAPSULE BY MOUTH  ONCE DAILY 7/15/21   Maude Presley MD   pioglitazone (ACTOS) 30 MG tablet TAKE 1 TABLET BY MOUTH ONCE DAILY 7/15/21   Maude Presley MD   pravastatin (PRAVACHOL) 40 MG tablet TAKE 1 TABLET BY MOUTH ONCE DAILY 7/15/21   Maude Presley MD   glimepiride (AMARYL) 4 MG tablet TAKE 1 TABLET BY MOUTH ONCE DAILY IN THE MORNING BEFORE BREAKFAST 7/15/21   Crystal Gao MD   hydroCHLOROthiazide (HYDRODIURIL) 25 MG tablet TAKE 1 TABLET BY MOUTH EVERY DAY 7/8/21   Crystal aGo MD   valsartan (DIOVAN) 160 MG tablet TAKE 1 TABLET BY MOUTH  DAILY 5/13/21   Crystal Gao MD   Accu-Chek Softclix Lancets MISC USE TO TEST TWICE A DAY. DX:E11.9 3/30/21   Crystal Gao MD   blood glucose test strips (ACCU-CHEK NEY PLUS) strip USE TO TEST TWICE A DAY. DX: E11.9 3/12/21   Crystal Gao MD   valsartan-hydroCHLOROthiazide (DIOVAN-HCT) 160-25 MG per tablet Take 1 tablet by mouth daily    Historical Provider, MD   Calcium Carb-Cholecalciferol (CALCIUM PLUS VITAMIN D3) 600-800 MG-UNIT TABS Take by mouth 2 times daily    Historical Provider, MD   Wheat Dextrin (BENEFIBER PO) Take by mouth as needed    Historical Provider, MD   acetaminophen (TYLENOL) 325 MG tablet Take 325 mg by mouth as needed for Pain    Historical Provider, MD   busPIRone (BUSPAR) 10 MG tablet Take 10 mg by mouth 2 times daily    Historical Provider, MD   Blood Glucose Monitoring Suppl (ACCU-CHEK NEY PLUS) w/Device KIT Check twice daily. DX: E11.9 1/22/18   Crystal Gao MD   Lancets MISC One every day 5/1/12   Crystal Gao MD   aspirin 81 MG EC tablet Take 81 mg by mouth daily. Historical Provider, MD   risperidone (RISPERDAL) 2 MG tablet Take 2 mg by mouth daily. Historical Provider, MD       Allergies:  Tessalon [benzonatate], Diazepam, Diclofenac sodium, Lycopene, Metformin, Olanzapine, Tape [adhesive tape], and Zocor [simvastatin - high dose]    Social History:    Tobacco:  reports that she has never smoked. She has never used smokeless tobacco.   Alcohol:  reports no history of alcohol use.    Illicit Drug: No  Family History:       Problem Relation Age of Onset    Cancer Sister         Ovarian cancer    Dementia Brother        REVIEW OF SYSTEMS:    CONSTITUTIONAL: negative  MUSCULOSKELETAL:  positive for  pain  All other systems reviewed and negative    PHYSICAL EXAM:    awake, alert, cooperative, no apparent distress, and appears stated age  MUSCULOSKELETAL:  RIGHT SHOULDER:  redness absent  warmth absent  swelling absent  tenderness present  range of motion - deferred  Sling in place. Moving wrist, fingers and thumb without difficulty. Good radial pulse, fingers warm. NVI    DATA:    CBC:   Recent Labs     07/19/21 1953 07/20/21  0629   WBC 8.8 10.0   HGB 13.3 13.0    220     BMP:    Recent Labs     07/19/21 1953 07/20/21  0629    135*   K 3.8 4.2    99   CO2 29 28   BUN 23* 20   CREATININE 0.9 0.8   GLUCOSE 122* 139*     INR: No results for input(s): INR in the last 72 hours. Radiology:   CT CERVICAL SPINE WO CONTRAST   Final Result   No acute hemorrhage or midline shift. CERVICAL SPINE FINDINGS:   BONES/ALIGNMENT: Trace reversal of the cervical lordosis. Grade 1   anterolisthesis of C3 on C4. Vertebral body heights appear maintained. Moderate and moderate to severe loss of disc height. Posterior elements appear   intact. DEGENERATIVE CHANGES: Scattered degenerative changes noted in the visualized   spine with spondylolisthesis. SOFT TISSUES: There is no prevertebral soft tissue swelling. Symmetric   prominent adenoids and palatine tonsils. Retropharyngeal course of the   carotid vasculature. IMPRESSION:   1. No acute fracture. Degenerative changes with grade spondylolisthesis. 2. Other findings as described. CT HEAD WO CONTRAST   Final Result   No acute hemorrhage or midline shift. CERVICAL SPINE FINDINGS:   BONES/ALIGNMENT: Trace reversal of the cervical lordosis. Grade 1   anterolisthesis of C3 on C4. Vertebral body heights appear maintained. Moderate and moderate to severe loss of disc height. Posterior elements appear   intact.       DEGENERATIVE CHANGES: Scattered degenerative changes noted in the visualized   spine with spondylolisthesis. SOFT TISSUES: There is no prevertebral soft tissue swelling. Symmetric   prominent adenoids and palatine tonsils. Retropharyngeal course of the   carotid vasculature. IMPRESSION:   1. No acute fracture. Degenerative changes with grade spondylolisthesis. 2. Other findings as described. XR SHOULDER RIGHT (MIN 2 VIEWS)   Final Result   Successful reduction of previously seen dislocation. XR CHEST PORTABLE   Final Result   No radiographic evidence of acute pulmonary disease. Right anterior shoulder dislocation. XR HUMERUS RIGHT (MIN 2 VIEWS)   Final Result   Anterior shoulder dislocation. Displaced fracture of the greater tuberosity. XR SHOULDER RIGHT (MIN 2 VIEWS)   Final Result   Anterior shoulder dislocation. Displaced fracture of the greater tuberosity. CT SHOULDER RIGHT WO CONTRAST    (Results Pending)          IMPRESSION/RECOMMENDATIONS:    Assessment: Right humerus greater tuberosity fracture    Plan:  1) No plans for surgical intervention. CT ordered to confirm reduction of shoulder dislocation, unable to fully confirm on plain films. Recommend sling for RUE at all times, may remove for hygiene; NWB RUE; pain control and ice. Discussed this POC with the patient and her daughter, the hospitalist and Dr. Yoshi Arellano. The patient may follow up outpatient with Dr. Yoshi Arellano. We will sign off, please contact us for further ortho concerns or needs. Discharge instructions completed. Thank you for the opportunity to consult on this patient.     LUCINDA Nichole - CNP

## 2021-07-20 NOTE — PROGRESS NOTES
Monitor placed by [address] [place] [address]  Length of monitor 30day  Monitor ordered by Dr. Michelle Aguirre    Serial number [id number]  Kit [id number]  Activation successful prior to pt leaving office?  Yes    All instructions reviewed with patient, spouse and daughter all 3 V/U

## 2021-07-20 NOTE — CONSULTS
[id number]    Fall  Bradycardia  Humerus fx  HTN  HLD  DM    30 day monitor  Echo  Change dilt to amlodipine

## 2021-07-20 NOTE — CARE COORDINATION
[facility]  Received referral regarding [facility]. services from [address] [place] [address]. Sent request to [facility] for [facility] coverage. [facility] is able to see pt for [facility].. Liaison to follow up with pt prior to discharge.     Electronically signed by Laith Choi RN on 7/21/2021 at 12:36 PM

## 2021-07-20 NOTE — ED PROVIDER NOTES
I independently evaluated and obtained a history and physical on Chales Friends. All diagnostic, treatment, and disposition assistants were made to myself in conjunction the advanced practice provider. For further details of this patient's emergency department encounter, please see the advanced practice provider's documentation. History: Patient is a 60-year-old female who presents via EMS after 2 falls this week as well as right upper arm pain since her fall today. Patient is concerned about syncopal episodes leading to her falls. She denies any weakness or numbness but does report pain to her right upper extremity at this time. Physician Exam: Pleasant elderly  female. Significant tenderness to right upper extremity. 2+ brachial radial and ulnar pulses to right upper extremity, cap refill. Intact axillary radial median ulnar nerve distribution sensation. MDM:    The Ekg interpreted by me shows  sinus bradycardia, rate=58   Axis is   Normal  QTc is  426ms  Intervals and Durations are unremarkable. ST Segments: Nonspecific abnormalities  No previous EKGs available for comparison    X-rays do show a anterior right shoulder dislocation as well as a displaced fracture of the greater tuberosity. Patient's neurovascular intact. She is consented, sedated, and closed reduction is used to sedate the patient. Please see my note for closed reduction procedure and see the PAs note for the sedation. Given the patient's recurrent falls and syncope I do feel she requires admission for PT OT evaluation and discharge planning. Patient family expressed understanding and agreement with this plan and the patient is admitted for further care. Ortho Injury - Closed Reduction of right anterior shoulder dislocation    Date/Time: 7/19/2021 11:52 PM  Performed by: Tyra Pat MD  Authorized by: Tyra Pat MD   Consent: Verbal consent obtained.   Risks and benefits: risks, benefits and alternatives were discussed  Consent given by: patient  Patient understanding: patient states understanding of the procedure being performed  Required items: required blood products, implants, devices, and special equipment available  Patient identity confirmed: verbally with patient  Injury location: shoulder  Location details: right shoulder  Injury type: fracture-dislocation  Dislocation type: anterior  Fracture type: greater humeral tuberosity  Pre-procedure neurovascular assessment: neurovascularly intact  Pre-procedure distal perfusion: normal  Pre-procedure neurological function: normal  Pre-procedure range of motion: reduced    Sedation:  Patient sedated: yes  Sedation type: moderate (conscious) sedation  Sedatives: propofol    Manipulation performed: yes  Skin traction used: yes  Immobilization: sling  Post-procedure neurovascular assessment: post-procedure neurovascularly intact  Post-procedure distal perfusion: normal  Post-procedure neurological function: normal  Post-procedure range of motion: improved  Patient tolerance: patient tolerated the procedure well with no immediate complications                FINAL IMPRESSION      1. Anterior dislocation of right shoulder, initial encounter    2. Closed displaced fracture of greater tuberosity of right humerus, initial encounter    3.  Syncope and collapse             Marcus Monaco MD  07/19/21 5370

## 2021-07-20 NOTE — CARE COORDINATION
Case Management Assessment  Initial Evaluation      Patient Name: Shannon Holloway  YOB: 1945  Diagnosis: Syncope and collapse [R55]  Date / Time: 7/19/2021  6:56 PM    Admission status/Date: 07/20/2021  Chart Reviewed: Yes      Patient Interviewed: Yes   Family Interviewed:  Yes - pt's  Jamarcus Erickson whom pt stated could remain present      Hospitalization in the last 30 days:  No      Health Care Decision Maker :   Primary Decision Maker: Prosper Melara Spouse - [phone]    (CM - must 1st enter selection under Navigator - emergency contact- Health Care Decision Maker Relationship and pick relationship)   Who do you trust or have selected to make healthcare decisions for you      Met with: pt and pt's  Bharathi Fitzgerald conducted  (bedside/phone): bedside    Current PCP: Dr. Ihsan Torres required for SNF : Y          3 night stay required -  N    ADLS  Support Systems/Care Needs: Spouse/Significant Other  Transportation: self some and  mostly    Meal Preparation: self    Housing  Living Arrangements: pt lives at home with her   Steps: Ramp  Intent for return to present living arrangements: Yes  Identified Issues: [id number] [facility] with [id number] : No Agency:(Services)  Type of Home Care Services: None  Passport/Waiver : No  :                      Phone Number:    Passport/Waiver Services: n/a          Durable Medical Equiptment   DME Provider: n/a  Equipment:   Walker_x__Cane__x_RTS___ BSC___Shower Chair___Hospital Bed___W/C____Other________  02 at ____Liter(s)---wears(frequency)_______ HHN ___ CPAP___ BiPap___   N/A____      Home O2 Use :  No    If No for home O2---if presently on O2 during hospitalization:  Yes  if yes CM to follow for potential DC O2 need  Informed of need for care provider to bring portable home O2 tank on day of discharge for nursing to connect prior to leaving:   Not Indicated  Verbalized agreement/Understanding:   Not Indicated    Community Service Affiliation  Dialysis:  No    · Agency:  · Location:  · Dialysis Schedule:  · Phone:   · Fax: Other Community Services: n/a    DISCHARGE PLAN: Explained Case Management role/services. Chart review completed. Met with pt and her  at bedside whom pt stated could remain present. Pt stated she is normally independent at home and she plans on returning home. She stated she has DME but doesn't use the DME. Discussed PT/OT recommendations for home with 24 hr assist and Home PT/OT and provided pt with a [facility]. Pt's  stated he can be with her 24/7 for the assistance. Pt wanted a referral to [facility]. They denied needs or questions for CM. Verified pt's address and 's phone number    Referral to [facility] with [facility] for RN/PT/OT.

## 2021-07-20 NOTE — PROGRESS NOTES
4 Eyes Skin Assessment     The patient is being assess for   Admission    I agree that 2 RN's have performed a thorough Head to Toe Skin Assessment on the patient. ALL assessment sites listed below have been assessed. Areas assessed for pressure by both nurses:   [x]   Head, Face, and Ears   [x]   Shoulders, Back, and Chest, Abdomen  [x]   Arms, Elbows, and Hands   [x]   Coccyx, Sacrum, and Ischium  [x]   Legs, Feet, and Heels        Skin Assessed Under all Medical Devices by both nurses:  O2 device tubing              All Mepilex Borders were peeled back and area peeked at by both nurses:  No: no mepilex in place   Please list where Mepilex Borders are located:  NA              **SHARE this note so that the co-signing nurse is able to place an eSignature**    Co-signer eSignature: Electronically signed by Cristino Alvarado RN on 7/20/21 at 6:44 AM EDT    Does the Patient have Skin Breakdown related to pressure? No     (Insert Photo here)         [place] Prevention initiated:  Yes   Wound Care Orders initiated:  No      [facility] nurse consulted for Pressure Injury (Stage 3,4, Unstageable, DTI, NWPT, Complex wounds)and New or Established Ostomies:  No      Patient is not able to demonstrate the ability to move from a reclining position to an upright position within the recliner due to weak.   Primary Nurse eSignature: Electronically signed by Alex Murphy RN on 7/20/21 at 3:29 AM EDT

## 2021-07-21 VITALS
WEIGHT: 236.4 LBS | BODY MASS INDEX: 44.63 KG/M2 | DIASTOLIC BLOOD PRESSURE: 73 MMHG | SYSTOLIC BLOOD PRESSURE: 148 MMHG | RESPIRATION RATE: 17 BRPM | OXYGEN SATURATION: 95 % | TEMPERATURE: 97.1 F | HEART RATE: 81 BPM | HEIGHT: 61 IN

## 2021-07-21 LAB
LV EF: 58 %
LVEF MODALITY: NORMAL

## 2021-07-21 PROCEDURE — 99233 SBSQ HOSP IP/OBS HIGH 50: CPT | Performed by: INTERNAL MEDICINE

## 2021-07-21 PROCEDURE — 6370000000 HC RX 637 (ALT 250 FOR IP): Performed by: INTERNAL MEDICINE

## 2021-07-21 PROCEDURE — 96372 THER/PROPH/DIAG INJ SC/IM: CPT

## 2021-07-21 PROCEDURE — 6360000002 HC RX W HCPCS: Performed by: INTERNAL MEDICINE

## 2021-07-21 PROCEDURE — 2580000003 HC RX 258: Performed by: INTERNAL MEDICINE

## 2021-07-21 PROCEDURE — 99238 HOSP IP/OBS DSCHRG MGMT 30/<: CPT | Performed by: INTERNAL MEDICINE

## 2021-07-21 PROCEDURE — 93306 TTE W/DOPPLER COMPLETE: CPT

## 2021-07-21 RX ORDER — AMLODIPINE BESYLATE 5 MG/1
5 TABLET ORAL DAILY
Qty: 30 TABLET | Refills: 3 | Status: SHIPPED | OUTPATIENT
Start: 2021-07-22 | End: 2021-08-12

## 2021-07-21 RX ORDER — HYDROCODONE BITARTRATE AND ACETAMINOPHEN 5; 325 MG/1; MG/1
1 TABLET ORAL EVERY 4 HOURS PRN
Qty: 15 TABLET | Refills: 0 | Status: SHIPPED | OUTPATIENT
Start: 2021-07-21 | End: 2021-07-26

## 2021-07-21 RX ADMIN — ACETAMINOPHEN 650 MG: 325 TABLET ORAL at 09:26

## 2021-07-21 RX ADMIN — HYDROCODONE BITARTRATE AND ACETAMINOPHEN 1 TABLET: 5; 325 TABLET ORAL at 14:13

## 2021-07-21 RX ADMIN — ENOXAPARIN SODIUM 40 MG: 40 INJECTION SUBCUTANEOUS at 09:12

## 2021-07-21 RX ADMIN — PRAVASTATIN SODIUM 40 MG: 40 TABLET ORAL at 09:12

## 2021-07-21 RX ADMIN — BUSPIRONE HYDROCHLORIDE 10 MG: 10 TABLET ORAL at 09:12

## 2021-07-21 RX ADMIN — CALCIUM 500 MG: 500 TABLET ORAL at 09:12

## 2021-07-21 RX ADMIN — ASPIRIN 81 MG: 81 TABLET, COATED ORAL at 09:12

## 2021-07-21 RX ADMIN — Medication 1000 UNITS: at 09:12

## 2021-07-21 RX ADMIN — Medication 10 ML: at 09:17

## 2021-07-21 RX ADMIN — VALSARTAN 160 MG: 80 TABLET, FILM COATED ORAL at 09:12

## 2021-07-21 RX ADMIN — AMLODIPINE BESYLATE 5 MG: 5 TABLET ORAL at 09:12

## 2021-07-21 ASSESSMENT — PAIN SCALES - GENERAL
PAINLEVEL_OUTOF10: 1
PAINLEVEL_OUTOF10: 6

## 2021-07-21 NOTE — PROGRESS NOTES
[address] Daily Progress Note      Admit Date:  7/19/2021    Subjective:  Ms. Taylor Suarez is seen for cardiology follow up.     REASON FOR CONSULTATION:  Fall, bradycardia.     HISTORY OF PRESENT ILLNESS:  The patient is a 59-year-old female who  presented to the hospital after a fall. She has had multiple falls  recently over the past week or two, one of which was severe enough that  it resulted in broken humerus. She feels a little bit dizzy before the  episode. She has had some dizziness, which has not resulted in a fall. The falls are sudden onset, brief, recovered spontaneously. She has not  had any recurrence since she has been here in the hospital.  She was  noted to have some bradycardia and for these reasons Cardiology  consulted.   The patient denies any chest pain, shortness of breath or  palpitations associated with any other falls or otherwise.     ROS:  12 point ROS negative in all areas as listed below except as in [place]  Constitutional, EENT, Cardiovascular, pulmonary, GI, , Musculoskeletal, skin, neurological, hematological, endocrine, Psychiatric    Past Medical History:   Diagnosis Date    Hyperlipidemia     Hyperlipidemia associated with type 2 diabetes mellitus ([facility] 10/5/2015    Hypertension     Morbid obesity with BMI of 40.0-44.9, adult ([facility] 10/5/2015    Osteoarthrosis, not specified whether generalized/localized, lower leg     Paranoid type schizophrenia ([facility]     Type 2 diabetes mellitus without complication ([facility] 08/4/2387    Type II or unspecified type diabetes mellitus without mention of complication, not stated as uncontrolled      Past Surgical History:   Procedure Laterality Date    BLEPHAROPLASTY  5/10/2012    COLONOSCOPY  8/1/2007    JOINT REPLACEMENT         Objective:   BP (!) 148/73   Pulse 81   Temp 97.1 °F (36.2 °C) (Oral)   Resp 17   Ht 5' 1\" (1.549 m)   Wt 236 lb 6.4 oz (107.2 kg)   SpO2 95%   Breastfeeding No   BMI 44.67 kg/m² Intake/Output Summary (Last 24 hours) at 7/21/2021 1104  Last data filed at 7/21/2021 0816  Gross per 24 hour   Intake 1136.34 ml   Output 700 ml   Net 436.34 ml       TELEMETRY: NSR    Physical Exam:  General: No Respiratory distress, appears well developed and well nourished. Eyes:  Sclera nonicteric  Nose/Sinuses:  negative findings: nose shows no deformity, asymmetry, or inflammation, nasal mucosa normal, septum midline with no perforation or bleeding  Back:  no pain to palpation  Joint:  no active joint inflammation  Musculoskeletal:  negative  Skin:  Warm and dry  Neck:  Negative for JVD and Carotid Bruits. Chest:  Clear to auscultation, respiration easy  Cardiovascular:  RRR, S1S2 normal, no murmur, no rub or thrill.   Abdomen:  Soft normal liver and spleen  Extremities:   No edema, clubbing, cyanosis,  Pulses: Femoral and pedal pulses are normal.  Neuro: intact    Medications:    aspirin  81 mg Oral Daily    busPIRone  10 mg Oral BID    pravastatin  40 mg Oral Daily    valsartan  160 mg Oral Daily    sodium chloride flush  5-40 mL Intravenous 2 times per day    enoxaparin  40 mg Subcutaneous Daily    calcium elemental  500 mg Oral BID    And    Vitamin D  1,000 Units Oral BID    amLODIPine  5 mg Oral Daily    risperiDONE  1 mg Oral Nightly      sodium chloride       HYDROcodone 5 mg - acetaminophen, sodium chloride flush, sodium chloride, potassium chloride **OR** potassium alternative oral replacement **OR** potassium chloride, magnesium sulfate, ondansetron **OR** ondansetron, polyethylene glycol, acetaminophen **OR** acetaminophen, perflutren lipid microspheres    Lab Data:  CBC:   Recent Labs     07/19/21 1953 07/20/21  0629   WBC 8.8 10.0   HGB 13.3 13.0   HCT 39.7 39.0   MCV 88.1 88.6    220     BMP:   Recent Labs     07/19/21 1953 07/20/21  0629    135*   K 3.8 4.2    99   CO2 29 28   BUN 23* 20   CREATININE 0.9 0.8     LIVER PROFILE:   Recent Labs 07/19/21 1953   AST 23   ALT 15   BILITOT 0.3   ALKPHOS 85   troponin x3 <0.01  PT/INR: No results for input(s): PROTIME, INR in the last 72 hours. APTT: No results for input(s): APTT in the last 72 hours. BNP:  No results for input(s): BNP in the last 72 hours. IMAGING:   Echo pending  Assessment:  Syncope    Sinus  Bradycardia  Resolved   Hypertension    Patient Active Problem List    Diagnosis Date Noted    Syncope and collapse 07/20/2021    [facility], stage III (moderate) [facility] 09/03/2019    Type 2 diabetes mellitus without complication ([facility] 72/99/8447    Hyperlipidemia associated with type 2 diabetes mellitus [facility] 10/05/2015    Morbid obesity with BMI of 40.0-44.9, adult ([facility] 10/05/2015    Achilles tendinitis 05/24/2011    Hypertension     Osteoarthrosis involving lower leg     Paranoid type schizophrenia ([facility]        Plan:  1. Will review echo if normal she can go home with a 48-72 hr monitor  2.  Keep her off bradycardia inducing drugs    Core Measures:  · Discharge instructions:   · LVEF documented:   · ACEI for LV dysfunction:   · Smoking Cessation:    Lucie Morton MD, MD 7/21/2021 11:04 AM

## 2021-07-21 NOTE — DISCHARGE INSTR - COC
Continuity of Care Form    Patient Name: Analy Randolph   :    MRN:  [id number]    Admit date:  2021  Discharge date:  21      Code Status Order: Full Code   Advance Directives:     Admitting Physician:  Timo Alan MD  PCP: Myesha Crane MD    Discharging Nurse: Laura Marks [address] Unit/Room#: /2097-77  Discharging Unit Phone Number: [phone]    Emergency Contact:   Extended Emergency Contact Information  Primary Emergency Contact: Prosper Melara  Address: [address] [id number] [address] Phone: [phone]  Mobile Phone: [phone]  Relation: Spouse    Past Surgical History:  Past Surgical History:   Procedure Laterality Date    BLEPHAROPLASTY  5/10/2012    COLONOSCOPY  2007    JOINT REPLACEMENT         Immunization History:   Immunization History   Administered Date(s) Administered    COVID-19, CONNER Penaloza, 100mcg/0.5mL 2021    Influenza 2011, 09/10/2012, 2013    Influenza Virus Vaccine 2014, 10/05/2015, 2019, 10/05/2020    Influenza Whole 2010    Influenza, High Dose (Fluzone 65 yrs and older) 10/10/2016, 10/30/2017, 10/05/2018    Pneumococcal Conjugate 13-valent (Sonda Nim) 10/05/2015    Pneumococcal Polysaccharide (Grevehnwh54) 09/10/2012    Zoster Live (Zostavax) 2013       Active Problems:  Patient Active Problem List   Diagnosis Code    Benign essential HTN I10    Osteoarthrosis involving lower leg M17.10    Paranoid type schizophrenia ([address] F20.0    Achilles tendinitis M76.60    Type 2 diabetes mellitus without complication ([address] M41.0    Hyperlipidemia associated with type 2 diabetes mellitus ([address] E11.69, E78.5    Morbid obesity with BMI of 40.0-44.9, adult ([address] E66.01, Z68.41    Chronic kidney disease, stage III (moderate) ([address] N18.30    Syncope and collapse R55    Anterior dislocation of right shoulder S45.65A    Closed displaced fracture of greater tuberosity of right humerus S42.251A       Isolation/Infection:   Isolation          No Isolation        Patient Infection Status     None to display          Nurse Assessment:  Last Vital Signs: BP (!) 148/73   Pulse 81   Temp 97.1 °F (36.2 °C) (Oral)   Resp 17   Ht 5' 1\" (1.549 m)   Wt 236 lb 6.4 oz (107.2 kg)   SpO2 95%   Breastfeeding No   BMI 44.67 kg/m²     Last documented pain score (0-10 scale): Pain Level: 1  Last Weight:   Wt Readings from Last 1 Encounters:   07/21/21 236 lb 6.4 oz (107.2 kg)     Mental Status:  oriented    IV Access:  - None    Nursing Mobility/ADLs:  Walking   Independent  Transfer  Assisted  Bathing  Assisted  Dressing  Assisted  Toileting  Assisted  Feeding  Assisted  Med Admin  Assisted  Med Delivery   whole    Wound Care Documentation and Therapy:        Elimination:  Continence:   · Bowel: Yes  · Bladder: Yes  Urinary Catheter: None   Colostomy/Ileostomy/Ileal Conduit: No       Date of Last BM: 7/20/2021      Intake/Output Summary (Last 24 hours) at 7/21/2021 1235  Last data filed at 7/21/2021 1203  Gross per 24 hour   Intake 1016.34 ml   Output 900 ml   Net 116.34 ml     I/O last 3 completed shifts: In: 896.3 [P.O.:300; I.V.:596.3]  Out: 700 [Urine:700]    Safety Concerns:     History of Falls (last 30 days)    Impairments/Disabilities:      Right arm sling for support for fracture in shoulder    Nutrition Therapy:  Current Nutrition Therapy:   - Oral Diet:  Carb Control 4 carbs/meal (1800kcals/day)    Routes of Feeding: Oral  Liquids: No Restrictions  Daily Fluid Restriction: no  Last Modified Barium Swallow with Video (Video Swallowing Test): not done    Treatments at the Time of Hospital Discharge:   Respiratory Treatments: none  Oxygen Therapy:  is not on home oxygen therapy.   Ventilator:    - No ventilator support    Rehab Therapies:   Weight Bearing Status/Restrictions: None weight bearing on right arm   Other Medical Equipment (for information only, NOT a DME order):  Right arm splint  Other Treatments:     Patient's personal belongings (please select all that are sent with patient):  None    RN SIGNATURE:  Electronically signed by Eliza Monte RN on 7/21/21 at 12:40 PM EDT    CASE MANAGEMENT/SOCIAL WORK SECTION    Inpatient Status Date: 7/21/2021    Readmission Risk Assessment Score:  Readmission Risk              Risk of Unplanned Readmission:  13           Discharging to Facility/ Agency   · Name: [facility]  · Address:  · Phone: [phone]  · Fax: [phone]: LEVEL 1 STANDARD    Home health agency to establish plan of care for patient over 60 day period   Nursing  Initial home SN evaluation visit to occur within 24-48 hours for:  medication management  VS and clinical assessment  S&S chronic disease exacerbation education + when to contact MD / NP  care coordination  Medication Reconciliation during 1st SN visit       PT/OT   Evaluate with goal of regaining prior level of functioning   OT to evaluate if patient has [address] needs for personal care      PCP Visit scheduled within 7 days of hospital discharge           / signature: Electronically signed by Jody Rosa RN on 7/21/21 at 12:47 PM EDT    PHYSICIAN SECTION    Prognosis: {Prognosis:4383906020}    Condition at Discharge: Alma[address] Rissa Almonte Patient Condition:[id number]}    Rehab Potential (if transferring to Rehab): {Prognosis:6913496108}    Recommended Labs or Other Treatments After Discharge: SN, PT/OT  HCV and Zoom Programs    Physician Certification: I certify the above information and transfer of Jose Antonio Corona  is necessary for the continuing treatment of the diagnosis listed and that she requires [employer] for less 30 days.      Update Admission H&P: {CHP DME Changes in MZPNT:323554935}    PHYSICIAN SIGNATURE:  Electronically signed by Jorge Prather MD/ Jody Rosa RN on 7/21/21 at 12:48 PM EDT

## 2021-07-21 NOTE — PROGRESS NOTES
Patient educated on discharge instructions as well as new medications use, dosage, administration and possible side effects. Patient verified knowledge. IV removed without difficulty and dry dressing in place. Telemetry monitor removed and returned to [facility]. Pt left facility in stable condition to home with all of their personal belongings.  Maribeth Youssef RN

## 2021-07-21 NOTE — PROGRESS NOTES
Pt resting in bed watching TV. She denies any pain while sitting still but states pain jumps to 9/10 with any movement. PRN APAP given to help keep pain under control. Assessment complete-see flowsheet. Medications given-see MAR. Pt denies further needs, call light and bedside table within reach. Will continue to monitor.

## 2021-07-21 NOTE — DISCHARGE SUMMARY
Name:  Tan Johnson  Room:  /0144-58  MRN:    [id number]    Discharge Summary      This discharge summary is in conjunction with a complete physical exam done on the day of discharge. Discharging Physician: Dr. Eller Moder: 7/19/2021  Discharge:  7/21/2021    HPI taken from admission H&P:      The patient is a 76 y.o. female with PMH below, presents with fall today and multiple near falls in last week in last week, R shoulder/arm pain. Pt has reportedly fell today while working in her kitchen. She denies LOC or hitting head. She says she lost her balance and fell landing on her R shoulder. She remembers hitting the floor. She reports that she might have felt a little lightheaded immediately prior to her fall. She is really not able to tell me what exactly is causing her to lose her balance. ED initially reported that she has fallen multiple times previously this week, however, pt says she did lose her balance a couple of times earlier in the week but did not actually fall. She or someone near her was able to catch her to prevent actually falling. Since her fall immediately PTA she has had moderate to severe pain in her R shoulder/upper arm. Exac by attempted movement of RUE. She was found to have a displaced Fx of the R humerus' greater tuberosity and anterior dislocation of the R shoulder. Shoulder was reduced in ER. Pt currently in sling. No CP, abd pain, SOB/MACKEY. Diagnoses this Admission and Hospital Course     1. Near syncope vs fall    - pt reports she had a fall but unsure of syncope. Pt with hx of tardive dyskinesia . -  Poor historian  - ct head neg   -   Chk'd orthostatics neg. given  IVF, telemonitor with no arrythmias  - cardiology consulted, ECHo as below with stable EF , no RWMA  - stopped cardizem for mild bradycardia, changed to norvasc  - dc home with holter monitor     2. R humerus fracture w/ anterior dislocation reduced in ER.   PRN Norco, Ortho c/s - sling for RUE, NWB RUE, f/u OP with Dr. Olamide Argueta. 3. Mild bradycardia on EKG in 50's , stopped cardizem    4. DM2, resumed home meds. Sugars stable     5. Hx of tardive dyskinesia - not on any meds      Procedures (Please Review Full Report for Details)  N/A    Consults    Cardiology  Orthopedic Surgery    Physical Exam at Discharge:    BP (!) 148/73   Pulse 81   Temp 97.1 °F (36.2 °C) (Oral)   Resp 17   Ht 5' 1\" (1.549 m)   Wt 236 lb 6.4 oz (107.2 kg)   SpO2 95%   Breastfeeding No   BMI 44.67 kg/m²       General:  Elderly female up in bed   Awake, alert and oriented. Appears to be not in any distress  Mucous Membranes:  Pink , anicteric  Neck: No JVD, no carotid bruit, no thyromegaly  Chest:  Clear to auscultation bilaterally, no added sounds  Cardiovascular:  RRR S1S2 heard, no murmurs or gallops  Abdomen:  Soft, undistended, non tender, no organomegaly, BS present  Extremities: right UE in sling  frequent dystonic movements noted in UE  No edema or cyanosis. Distal pulses well felt  Neurological : grossly normal      CBC:   Recent Labs     07/19/21 1953 07/20/21  0629   WBC 8.8 10.0   HGB 13.3 13.0   HCT 39.7 39.0   MCV 88.1 88.6    220     BMP:   Recent Labs     07/19/21 1953 07/20/21  0629    135*   K 3.8 4.2    99   CO2 29 28   BUN 23* 20   CREATININE 0.9 0.8     LIVER PROFILE:   Recent Labs     07/19/21 1953   AST 23   ALT 15   BILITOT 0.3   ALKPHOS 85     CULTURES    SARS-COV-2 - Rapid: Not detected     RADIOLOGY    CT SHOULDER RIGHT WO CONTRAST 7/20   Final Result   1. Acute Hill-Sachs fracture deformity/concavity with slightly displaced   right greater tuberosity fracture. Fracture fragments of the axillary pouch   and inferior to the glenoid likely arising from the anterior inferior glenoid. 2. Articular surface of the medial humeral head properly articulates with the   glenoid consistent with reduction.    3. Respiratory motion and dependent atelectasis in the right lung.   4. Moderate degenerative changes of the right AC joint. CT CERVICAL SPINE WO CONTRAST 7/20   Final Result   No acute hemorrhage or midline shift. CERVICAL SPINE FINDINGS:   BONES/ALIGNMENT: Trace reversal of the cervical lordosis. Grade 1   anterolisthesis of C3 on C4. Vertebral body heights appear maintained. Moderate and moderate to severe loss of disc height. Posterior elements appear   intact. DEGENERATIVE CHANGES: Scattered degenerative changes noted in the visualized   spine with spondylolisthesis. SOFT TISSUES: There is no prevertebral soft tissue swelling. Symmetric   prominent adenoids and palatine tonsils. Retropharyngeal course of the   carotid vasculature. IMPRESSION:   1. No acute fracture. Degenerative changes with grade spondylolisthesis. 2. Other findings as described. CT HEAD WO CONTRAST 7/20   Final Result   No acute hemorrhage or midline shift. CERVICAL SPINE FINDINGS:   BONES/ALIGNMENT: Trace reversal of the cervical lordosis. Grade 1   anterolisthesis of C3 on C4. Vertebral body heights appear maintained. Moderate and moderate to severe loss of disc height. Posterior elements appear   intact. DEGENERATIVE CHANGES: Scattered degenerative changes noted in the visualized   spine with spondylolisthesis. SOFT TISSUES: There is no prevertebral soft tissue swelling. Symmetric   prominent adenoids and palatine tonsils. Retropharyngeal course of the   carotid vasculature. IMPRESSION:   1. No acute fracture. Degenerative changes with grade spondylolisthesis. 2. Other findings as described. XR SHOULDER RIGHT (MIN 2 VIEWS) 7/19   Final Result   Successful reduction of previously seen dislocation. XR CHEST PORTABLE 7/19   Final Result   No radiographic evidence of acute pulmonary disease. Right anterior shoulder dislocation.          XR HUMERUS RIGHT (MIN 2 VIEWS) 7/19   Final Result   Anterior shoulder dislocation. Displaced fracture of the greater tuberosity. XR SHOULDER RIGHT (MIN 2 VIEWS) 7/19   Final Result   Anterior shoulder dislocation. Displaced fracture of the greater tuberosity. TTE 7/21/2021     Left ventricular systolic function is normal with ejection fraction   estimated at 55-60 %. No regional wall motion abnormalities are noted. Left ventricular size is decreased. There is mild concentric left ventricular hypertrophy. Grade I diastolic dysfunction with normal filling pressure. The right atrium is mildly dilated. Mild posterior mitral annular calcification is present. Aortic valve sclerosis without aortic stenosis. Mild tricuspid regurgitation. Normal systolic pulmonary artery pressure (SPAP) estimated at 39 mmHg (RA   pressure 3 mmHg). Mild pulmonic regurgitation present. 7/14/2015 55%, LVH, trace TR    Discharge Medications     Medication List      START taking these medications    amLODIPine 5 MG tablet  Commonly known as: NORVASC  Take 1 tablet by mouth daily  Start taking on: July 22, 2021     HYDROcodone-acetaminophen 5-325 MG per tablet  Commonly known as: NORCO  Take 1 tablet by mouth every 4 hours as needed for Pain for up to 5 days. CONTINUE taking these medications    Accu-Chek Ginette Plus strip  Generic drug: blood glucose test strips  USE TO TEST TWICE A DAY. DX: E11.9     Accu-Chek Ginette Plus w/Device Kit  Check twice daily.  DX: E11.9     acetaminophen 325 MG tablet  Commonly known as: TYLENOL     aspirin 81 MG EC tablet     BENEFIBER PO     busPIRone 10 MG tablet  Commonly known as: BUSPAR     Calcium Plus Vitamin D3 600-800 MG-UNIT Tabs  Generic drug: Calcium Carb-Cholecalciferol     glimepiride 4 MG tablet  Commonly known as: AMARYL  TAKE 1 TABLET BY MOUTH ONCE DAILY IN THE MORNING BEFORE BREAKFAST     hydroCHLOROthiazide 25 MG tablet  Commonly known as: HYDRODIURIL  TAKE 1 TABLET BY MOUTH EVERY DAY     * Lancets Misc  One every day     * Accu-Chek Softclix Lancets Misc  USE TO TEST TWICE A DAY. DX:E11.9     pioglitazone 30 MG tablet  Commonly known as: ACTOS  TAKE 1 TABLET BY MOUTH ONCE DAILY     pravastatin 40 MG tablet  Commonly known as: PRAVACHOL  TAKE 1 TABLET BY MOUTH ONCE DAILY     RisperDAL 2 MG tablet  Generic drug: risperiDONE     valsartan 160 MG tablet  Commonly known as: DIOVAN  TAKE 1 TABLET BY MOUTH  DAILY         * This list has 2 medication(s) that are the same as other medications prescribed for you. Read the directions carefully, and ask your doctor or other care provider to review them with you. STOP taking these medications    dilTIAZem 360 MG extended release capsule  Commonly known as: CARDIZEM CD     valsartan-hydroCHLOROthiazide 160-25 MG per tablet  Commonly known as: DIOVAN-HCT           Where to Get Your Medications      You can get these medications from any pharmacy    Bring a paper prescription for each of these medications  · amLODIPine 5 MG tablet  · HYDROcodone-acetaminophen 5-325 MG per tablet           Discharged in stable condition to home. Follow Up: Follow up with PCP in 1 week.   [facility] as scheduled    [facility] in 1 week       Aroldo Combs MD, 7/21/2021 2:13 PM

## 2021-07-21 NOTE — CONSULTS
[address]                 [address] [facility]                                  CONSULTATION    PATIENT NAME: Royce Whyte                 :        1945  MED REC NO:   [id number]                          ROOM:         ACCOUNT NO:   [de-identified]                           ADMIT DATE: 2021  PROVIDER:     Tati Chaudhry MD    CONSULT DATE:  2021    REASON FOR CONSULTATION:  Fall, bradycardia. HISTORY OF PRESENT ILLNESS:  The patient is a 22-year-old female who  presented to the hospital after a fall. She has had multiple falls  recently over the past week or two, one of which was severe enough that  it resulted in broken humerus. She feels a little bit dizzy before the  episode. She has had some dizziness, which has not resulted in a fall. The falls are sudden onset, brief, recovered spontaneously. She has not  had any recurrence since she has been here in the hospital.  She was  noted to have some bradycardia and for these reasons Cardiology  consulted. The patient denies any chest pain, shortness of breath or  palpitations associated with any other falls or otherwise. PAST MEDICAL HISTORY:  1. Hypertension. 2.  Hyperlipidemia. 3.  Diabetes. SOCIAL HISTORY:  She does not smoke. FAMILY HISTORY:  Positive for cancer and dementia. REVIEW OF SYSTEMS:  No fevers or chills. No cough. No focal neurologic  symptoms. No headache or visual changes. No recent GI or  bleeding. No recent or upcoming surgeries. She has pain in her right arm when she  fell. All other systems are negative except as present illness. ALLERGIES:  See list in the chart, which I have reviewed. MEDICATIONS:  See list in the chart, which I have reviewed. PHYSICAL EXAMINATION:  VITALS:  Blood pressure is 129/76, heart rate 62, respirations 17,  temperature is 97. She is 98% saturated on oxygen.   GENERAL: Well-developed, well-nourished white female, in no acute  distress. HEENT:  Normocephalic, atraumatic. Oropharynx clear. Moist mucous  membranes. NECK:  Supple. CHEST:  Clear. CARDIAC:  Regular S1 and S2. There is no S3 or S4 gallop. There is no  significant murmur. Jugular venous pressure is normal.  Carotids are 2+  and symmetric without bruit. ABDOMEN:  Soft, nontender. Positive bowel sounds. EXTREMITIES:  No cyanosis or edema. NEUROLOGIC:  Grossly nonfocal.  SKIN:  Warm and dry. PSYCHIATRIC:  Affect calm. DIAGNOSTIC STUDIES:  Telemetry, normal sinus rhythm. EKG, sinus  bradycardia. Low voltage. Rapid COVID test negative. Creatinine 0.8,  hemoglobin 13.0. Chest x-ray, personally reviewed shows no pulmonary  edema. IMPRESSION:  1. A fall, which appeared to be due to disequilibrium. Also, needed to  consider the possibility of dysrhythmias. 2.  Bradycardia. She has not had significant bradycardia here in the  hospital that would explain her falls. Diltiazem is likely contributing  to the bradycardia. 3.  Humerus fracture. 4.  Hypertension, suboptimally controlled. 5.  Hyperlipidemia. 6.  Diabetes. RECOMMENDATION:  1. A 3-day event monitor. 2.  Echocardiogram.  3.  Discontinue diltiazem. 4.  Initiate amlodipine 10 mg daily. 5.  Follow up with us in the office following her event monitor.         Eligio Carreon MD    D: 07/20/2021 16:05:14       T: 07/20/2021 17:40:59     [email or web address]  Job#: [id number]     Doc#: [id number]

## 2021-07-21 NOTE — PROGRESS NOTES
Meds give per [facility], pt states pain at 1 on a scale 1-10 Tylenol given, will reassess pain level. ECHO done this morning waiting for results. Call light within reach, bed in lowest position.  Idalia Shaw RN

## 2021-07-21 NOTE — PLAN OF CARE
Problem: Skin Integrity:  Goal: Will show no infection signs and symptoms  Description: Will show no infection signs and symptoms  7/20/2021 2350 by Martha Otto RN  Outcome: Ongoing  7/20/2021 1149 by Cleopatra Alvarado RN  Outcome: Ongoing  Goal: Absence of new skin breakdown  Description: Absence of new skin breakdown  Outcome: Ongoing     Problem: Falls - Risk of:  Goal: Will remain free from falls  Description: Will remain free from falls  Outcome: Ongoing  Goal: Absence of physical injury  Description: Absence of physical injury  Outcome: Ongoing     Problem: Pain:  Goal: Pain level will decrease  Description: Pain level will decrease  Outcome: Ongoing  Goal: Control of acute pain  Description: Control of acute pain  Outcome: Ongoing  Note: PRN APAP q 6hr, PRN Norco q 4 hr

## 2021-07-21 NOTE — CARE COORDINATION
DISCHARGE ORDER  Date/Time 2021 12:49 PM  Completed by: Faviola Owens RN, Case Management    Patient Name: Shannon Holloway      : 1945  Admitting Diagnosis: Syncope and collapse [R55]      Admit order Date and Status:2021 inpt  (verify MD's last order for status of admission)      Noted discharge order. If applicable PT/OT recommendation at Discharge: home with  eval and home PT/OT  DME recommendation by PT/OT:n/a  Confirmed discharge plan  (pt): Yes  with whom__________pt_____  If pt confirmed DC plan does family need to be contacted by CM No if yes who__n/a____  Discharge Plan: Reviewed chart. Role of discharge planner explained and patient verbalized understanding. Discharge order is noted. Pt is being d/c'd to home today. Pt's O2 sats are 95% on RA. Pt requests [facility] for SN/PT/OT. CM called Abhay Pickens with [facility] for PT/OT/SN for d/c and can accept, +HHC orders, +eCOC. No further discharge needs needed or noted. Reviewed chart. Role of discharge planner explained and patient verbalized understanding. Discharge order is noted. Has Home O2 in place on admit:  No  Informed of need to bring portable home O2 tank on day of discharge for nursing to connect prior to leaving:   Not Indicated  Verbalized agreement/Understanding:   Not Indicated    Discharge timeout done with Ramirez Meraz RN. All discharge needs and concerns addressed.

## 2021-07-22 ENCOUNTER — CARE COORDINATION (OUTPATIENT)
Dept: CASE MANAGEMENT | Age: 76
End: 2021-07-22

## 2021-07-22 DIAGNOSIS — I10 BENIGN ESSENTIAL HTN: Primary | ICD-10-CM

## 2021-07-22 PROCEDURE — 1111F DSCHRG MED/CURRENT MED MERGE: CPT | Performed by: INTERNAL MEDICINE

## 2021-07-22 NOTE — CARE COORDINATION
[facility] Transitions Initial Follow Up Call    Call within 2 business days of discharge: Yes    Patient: Mercedes Ramirez Patient : 1945   MRN: [id number]  Reason for Admission: near syncope vs fall, R humerus fx with anterior dislocation (marla, JEFF LÓPEZ, f/up Dr Damián Au), mild bradycardia, DM2, hx tardive dyskinesia  Discharge Date: 21 RARS: Readmission Risk Score: 13      Last Discharge [facility]       Complaint Diagnosis Description Type Department Provider    21 Fall Anterior dislocation of right shoulder, initial encounter . .. ED to Hosp-Admission (Discharged) (ADMITTED) [facility] PCU Josseline Benjamin MD; Mahesh Castillo . .. Spoke with: Mercedes Ramirez (patient)    Facility: [facility]    Non-face-to-face services provided:  Obtained and reviewed discharge summary and/or continuity of care documents  Education of patient/family/caregiver/guardian to support self-management-s/s monitor  Assessment and support for treatment adherence and medication management-[id number] completed    Was this an external facility discharge? No Discharge Facility: NA    Challenges to be reviewed by the provider   Additional needs identified to be addressed with provider Yes  medications-Diovan HCT was stopped but valsartan and HCTZ remained on med list individuatlly. Dr Dacia Diaz' note states stop valsartan. There is no mention of HCTZ. Also Girish note states initiate amlodipine 10mg and patient DC home with new rx for amlodipine 5mg. Please review and make recommendation on HCTZ (continue or stop) and amlodipine dose. Method of communication with provider : chart routing    Advance Care Planning:   Does patient have an Advance Directive:  decision maker updated. Was this a readmission?  No  Patient stated reason for admission: fall with injury  Patients top risk factors for readmission: functional physical ability, medical condition    Care Transition Nurse (CTN) contacted the patient by med review there was some confusion on the dose of amlodipine (10mg rec by Girish but DC on 5mg) as well as on valsartan and HCTZ. CTN will send Epic inbox to discharging MD to clarify. Patient is in agreement with plan. States explains she was on Diovan-HCT for a long time but started having some vision issues she felt were related to the HCTZ so the medication was stopped and she was on valsartan only for a while before deciding to restart HCTZ - states that is how all of the above medications were on her med list. She states that today she held all of these medications. She will await clarification. She has appt with PCP on 8/2/2021 as below. She denies other needs/referrals at this time. CTN provided contact information for future needs. CTN spoke with Feliciano Marquez at [facility] and reviewed chart for clarification. She states since valsartan and HCTZ prescribed by PCP to clarify with discharging MD or PCP. Outreach to [facility] liaison to update her to phone issues patient is having and let her know number CTN was able to successfully reach patient at. Secure message left at this time.     Care Transitions 24 Hour Call    Schedule Follow Up Appointment with PCP: Completed  Do you have any ongoing symptoms?: No  Do you have a copy of your discharge instructions?: Yes  Do you have all of your prescriptions and are they filled?: Yes  Have you been contacted by a [facility] Pharmacist?: No  Have you scheduled your follow up appointment?: Yes  How are you going to get to your appointment?: Car - family or friend to transport  Were you discharged with any Home Care or Post Acute Services: Yes  Post Acute Services: [address] (Comment: [facility])  Do you feel like you have everything you need to keep you well at home?: Yes  Care Transitions Interventions  No Identified Needs         Follow Up  Future Appointments   Date Time Provider Judy Molina   8/2/2021  9:20 MD Ashley Monson Int None   9/14/2021 10:45 AM Ainsley Galarza MD P CLETAMMY Barillas RN

## 2021-07-22 NOTE — TELEPHONE ENCOUNTER
PICC Placement Note  Spoke to Dr. Abisai Carreon re: PICC placement with fevers and pending blood cultures. Ok to proceed with PICC placement. PRE-PROCEDURE VERIFICATION  Correct Procedure: yes  Correct Site:  yes  Temperature: Temp: (!) 101 °F (38.3 °C), Temperature Source: Temp Source: Bladder  Recent Labs     09/04/20  0710   BUN 4*   CREA 0.47*      WBC 7.3     Allergies: Patient has no known allergies. Education materials, including PICC Booklet, for PICC Care given to patient: yes. See Patient Education activity for further details. PROCEDURE DETAIL  A triple lumen PICC line was started for vascular access. The following documentation is in addition to the PICC properties in the lines/airways flowsheet :  Lot #: [id number]  Was xylocaine 1% used intradermally:  yes  Catheter Length: 41 at 1 cm (cm)  Vein Selection for PICC:left cephalic  Central Line Bundle followed yes  Complication Related to Insertion: none    The placement was verified by ECG/Sapiens technology: The  tip location is on the left side and the tip is in the  superior vena cava. See ECG results for PICC tip placement. Report given to nurse Rk Tomas. Line is okay to use.     Miranda Neff RN Resume valsartan and hctz   I stopped the diovan because its the same   Continue amlodipine 5 mg

## 2021-07-22 NOTE — CARE COORDINATION
CTN follow up:     Received message from Dr Herbert Ramos: \"Resume valsartan and hctz   I stopped the diovan because its the same   Continue amlodipine 5 mg \"    Outreach to patient at this time. Patient updated to instructions. She repeated the information. States she will restart the valsartan and HCTZ tomorrow. She is aware the diltiazem was stopped and taking the new med amlodipine 5mg daily. She denies needs at this time. She was able to schedule as below with ortho.     Nita Roque RN  Care Transition Nurse  [phone] mobile    Future Appointments   Date Time Provider Judy Molina   7/26/2021  9:00 AM Varghese Vo MD [facility]   8/2/2021  9:20 AM Crystal Gao MD [facility] Int None   9/14/2021 10:45 AM Lakesha Rose MD P CLER CAR [facility]

## 2021-07-29 ENCOUNTER — CARE COORDINATION (OUTPATIENT)
Dept: CASE MANAGEMENT | Age: 76
End: 2021-07-29

## 2021-07-29 NOTE — CARE COORDINATION
[facility] [facility] Transitions Follow Up Call    2021    Patient: Lily Garcia  Patient : 1945   MRN: [id number]  Reason for Admission: near syncope vs fall, R humerus fx with anterior dislocation (sling, NWB RUE, f/up Dr Max Aquino), mild bradycardia, DM2, hx tardive dyskinesia  Discharge Date: 21 RARS: Readmission Risk Score: 13       Outreach to patient, identified myself as the CTN. Call got dropped several times. She is en route to her new home in [place] and reception is poor. [facility]. visited but no further visits scheduled. [facility]. nurse did not review medications. She noted our conversations last week regarding diltiazem (she confirms she stopped this med). She is taking the amlodipine (new since DC), HCTZ and valsartan as instructed. She denies syncope, near syncope, lightheadedness, dizziness. She continues to be NWB RUE with sling. 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 She is aware of upcoming appts on  as below. She denies needs/concerns at this time. CTN will follow up next week. She has CTN call back number for future needs.      Follow Up  Future Appointments   Date Time Provider Judy Molina   2021  9:20 AM Maude Presley MD [place] [facility] None   2021  1:00 PM Ethan Steiner MD [facility]   2021 10:45 AM Jacek Black MD P CLER CAR TODD Belle RN

## 2021-08-02 ENCOUNTER — OFFICE VISIT (OUTPATIENT)
Dept: INTERNAL MEDICINE CLINIC | Age: 76
End: 2021-08-02

## 2021-08-02 ENCOUNTER — OFFICE VISIT (OUTPATIENT)
Dept: ORTHOPEDIC SURGERY | Age: 76
End: 2021-08-02
Payer: MEDICARE

## 2021-08-02 VITALS
WEIGHT: 226 LBS | DIASTOLIC BLOOD PRESSURE: 80 MMHG | HEART RATE: 70 BPM | BODY MASS INDEX: 42.67 KG/M2 | HEIGHT: 61 IN | SYSTOLIC BLOOD PRESSURE: 120 MMHG | RESPIRATION RATE: 12 BRPM

## 2021-08-02 VITALS — BODY MASS INDEX: 43.43 KG/M2 | WEIGHT: 230 LBS | HEIGHT: 61 IN

## 2021-08-02 DIAGNOSIS — N18.31 STAGE 3A CHRONIC KIDNEY DISEASE (HCC): ICD-10-CM

## 2021-08-02 DIAGNOSIS — E78.5 HYPERLIPIDEMIA ASSOCIATED WITH TYPE 2 DIABETES MELLITUS (HCC): ICD-10-CM

## 2021-08-02 DIAGNOSIS — Z00.00 MEDICARE ANNUAL WELLNESS VISIT, SUBSEQUENT: Primary | ICD-10-CM

## 2021-08-02 DIAGNOSIS — E11.69 HYPERLIPIDEMIA ASSOCIATED WITH TYPE 2 DIABETES MELLITUS (HCC): ICD-10-CM

## 2021-08-02 DIAGNOSIS — E11.9 TYPE 2 DIABETES MELLITUS WITHOUT COMPLICATION, WITHOUT LONG-TERM CURRENT USE OF INSULIN (HCC): ICD-10-CM

## 2021-08-02 DIAGNOSIS — M25.511 ACUTE PAIN OF RIGHT SHOULDER: ICD-10-CM

## 2021-08-02 DIAGNOSIS — M81.0 AGE-RELATED OSTEOPOROSIS WITHOUT CURRENT PATHOLOGICAL FRACTURE: ICD-10-CM

## 2021-08-02 DIAGNOSIS — E66.01 MORBID OBESITY WITH BMI OF 40.0-44.9, ADULT (HCC): ICD-10-CM

## 2021-08-02 DIAGNOSIS — I10 ESSENTIAL HYPERTENSION: ICD-10-CM

## 2021-08-02 DIAGNOSIS — Z00.00 ROUTINE GENERAL MEDICAL EXAMINATION AT A HEALTH CARE FACILITY: ICD-10-CM

## 2021-08-02 DIAGNOSIS — F20.0 PARANOID TYPE SCHIZOPHRENIA (HCC): ICD-10-CM

## 2021-08-02 DIAGNOSIS — S42.251A CLOSED DISPLACED FRACTURE OF GREATER TUBEROSITY OF RIGHT HUMERUS, INITIAL ENCOUNTER: Primary | ICD-10-CM

## 2021-08-02 DIAGNOSIS — S43.014A ANTERIOR DISLOCATION OF RIGHT SHOULDER, INITIAL ENCOUNTER: ICD-10-CM

## 2021-08-02 PROCEDURE — 3044F HG A1C LEVEL LT 7.0%: CPT | Performed by: INTERNAL MEDICINE

## 2021-08-02 PROCEDURE — 1123F ACP DISCUSS/DSCN MKR DOCD: CPT | Performed by: INTERNAL MEDICINE

## 2021-08-02 PROCEDURE — 4040F PNEUMOC VAC/ADMIN/RCVD: CPT | Performed by: INTERNAL MEDICINE

## 2021-08-02 PROCEDURE — 3017F COLORECTAL CA SCREEN DOC REV: CPT | Performed by: INTERNAL MEDICINE

## 2021-08-02 PROCEDURE — L3670 SO ACRO/CLAV CAN WEB PRE OTS: HCPCS | Performed by: ORTHOPAEDIC SURGERY

## 2021-08-02 PROCEDURE — 81002 URINALYSIS NONAUTO W/O SCOPE: CPT | Performed by: INTERNAL MEDICINE

## 2021-08-02 PROCEDURE — G0439 PPPS, SUBSEQ VISIT: HCPCS | Performed by: INTERNAL MEDICINE

## 2021-08-02 PROCEDURE — 99204 OFFICE O/P NEW MOD 45 MIN: CPT | Performed by: ORTHOPAEDIC SURGERY

## 2021-08-02 SDOH — HEALTH STABILITY: PHYSICAL HEALTH: ON AVERAGE, HOW MANY DAYS PER WEEK DO YOU ENGAGE IN MODERATE TO STRENUOUS EXERCISE (LIKE A BRISK WALK)?: 0 DAYS

## 2021-08-02 ASSESSMENT — PATIENT HEALTH QUESTIONNAIRE - PHQ9
SUM OF ALL RESPONSES TO PHQ QUESTIONS 1-9: 0
SUM OF ALL RESPONSES TO PHQ QUESTIONS 1-9: 0
2. FEELING DOWN, DEPRESSED OR HOPELESS: 0
SUM OF ALL RESPONSES TO PHQ9 QUESTIONS 1 & 2: 0
SUM OF ALL RESPONSES TO PHQ QUESTIONS 1-9: 0
1. LITTLE INTEREST OR PLEASURE IN DOING THINGS: 0

## 2021-08-02 ASSESSMENT — LIFESTYLE VARIABLES: HOW OFTEN DO YOU HAVE A DRINK CONTAINING ALCOHOL: 0

## 2021-08-02 NOTE — PATIENT INSTRUCTIONS
Personalized Preventive Plan for Brigido Simpson - 8/2/2021  Medicare offers a range of preventive health benefits. Some of the tests and screenings are paid in full while other may be subject to a deductible, co-insurance, and/or copay. Some of these benefits include a comprehensive review of your medical history including lifestyle, illnesses that may run in your family, and various assessments and screenings as appropriate. After reviewing your medical record and screening and assessments performed today your provider may have ordered immunizations, labs, imaging, and/or referrals for you. A list of these orders (if applicable) as well as your Preventive Care list are included within your After Visit Summary for your review. Other Preventive Recommendations:    · A preventive eye exam performed by an eye specialist is recommended every 1-2 years to screen for glaucoma; cataracts, macular degeneration, and other eye disorders. · A preventive dental visit is recommended every 6 months. · Try to get at least 150 minutes of exercise per week or 10,000 steps per day on a pedometer . · Order or download the FREE \"Exercise & Physical Activity: Your Everyday Guide\" from [employer]. Call 3-358.161.8178 or search [employer] online. · You need 2142-0188 mg of calcium and 8019-0338 IU of vitamin D per day. It is possible to meet your calcium requirement with diet alone, but a vitamin D supplement is usually necessary to meet this goal.  · When exposed to the sun, use a sunscreen that protects against both UVA and UVB radiation with an SPF of 30 or greater. Reapply every 2 to 3 hours or after sweating, drying off with a towel, or swimming. · Always wear a seat belt when traveling in a car. Always wear a helmet when riding a bicycle or motorcycle. Heart-Healthy Diet   Sodium, Fat, and Cholesterol Controlled Diet       What Is a Heart Healthy Diet?    A heart-healthy diet is one that limits sodium , certain types of fat , and cholesterol . This type of diet is recommended for:   People with any form of cardiovascular disease (eg, coronary heart disease , peripheral vascular disease , previous heart attack , previous stroke )   People with risk factors for cardiovascular disease, such as high blood pressure , high cholesterol , or diabetes   Anyone who wants to lower their risk of developing cardiovascular disease   Sodium    Sodium is a mineral found in many foods. In general, most people consume much more sodium than they need. Diets high in sodium can increase blood pressure and lead to edema (water retention). On a heart-healthy diet, you should consume no more than 2,300 mg (milligrams) of sodium per dayabout the amount in one teaspoon of table salt. The foods highest in sodium include table salt (about 50% sodium), processed foods, convenience foods, and preserved foods. Cholesterol    Cholesterol is a fat-like, waxy substance in your blood. Our bodies make some cholesterol. It is also found in animal products, with the highest amounts in fatty meat, egg yolks, whole milk, cheese, shellfish, and organ meats. On a heart-healthy diet, you should limit your cholesterol intake to less than 200 mg per day. It is normal and important to have some cholesterol in your bloodstream. But too much cholesterol can cause plaque to build up within your arteries, which can eventually lead to a heart attack or stroke. The two types of cholesterol that are most commonly referred to are:   Low-density lipoprotein (LDL) cholesterol  Also known as bad cholesterol, this is the cholesterol that tends to build up along your arteries. Bad cholesterol levels are increased by eating fats that are saturated or hydrogenated. Optimal level of this cholesterol is less than 100. Over 130 starts to get risky for heart disease.    High-density lipoprotein (HDL) cholesterol  Also known as good cholesterol, this type of cholesterol actually carries cholesterol away from your arteries and may, therefore, help lower your risk of having a heart attack. You want this level to be high (ideally greater than 60). It is a risk to have a level less than 40. You can raise this good cholesterol by eating olive oil, canola oil, avocados, or nuts. Exercise raises this level, too. Fat    Fat is calorie dense and packs a lot of calories into a small amount of food. Even though fats should be limited due to their high calorie content, not all fats are bad. In fact, some fats are quite healthful. Fat can be broken down into four main types. The good-for-you fats are:   Monounsaturated fat  found in oils such as olive and canola, avocados, and nuts and natural nut butters; can decrease cholesterol levels, while keeping levels of HDL cholesterol high   Polyunsaturated fat  found in oils such as safflower, sunflower, soybean, corn, and sesame; can decrease total cholesterol and LDL cholesterol   Omega-3 fatty acids  particularly those found in fatty fish (such as salmon, trout, tuna, mackerel, herring, and sardines); can decrease risk of arrhythmias, decrease triglyceride levels, and slightly lower blood pressure   The fats that you want to limit are:   Saturated fat  found in animal products, many fast foods, and a few vegetables; increases total blood cholesterol, including LDL levels   Animal fats that are saturated include: butter, lard, whole-milk dairy products, meat fat, and poultry skin   Vegetable fats that are saturated include: hydrogenated shortening, palm oil, coconut oil, cocoa butter   Hydrogenated or trans fat  found in margarine and vegetable shortening, most shelf stable snack foods, and fried foods; increases LDL and decreases HDL     It is generally recommended that you limit your total fat for the day to less than 30% of your total calories.  If you follow an 1800-calorie heart healthy diet, for example, this would mean 60 grams of fat or less per day. Saturated fat and trans fat in your diet raises your blood cholesterol the most, much more than dietary cholesterol does. For this reason, on a heart-healthy diet, less than 7% of your calories should come from saturated fat and ideally 0% from trans fat. On an 1800-calorie diet, this translates into less than 14 grams of saturated fat per day, leaving 46 grams of fat to come from mono- and polyunsaturated fats.    Food Choices on a Heart Healthy Diet   Food Category   Foods Recommended   Foods to Avoid   Grains   Breads and rolls without salted tops Most dry and cooked cereals Unsalted crackers and breadsticks Low-sodium or homemade breadcrumbs or stuffing All rice and pastas   Breads, rolls, and crackers with salted tops High-fat baked goods (eg, muffins, donuts, pastries) Quick breads, self-rising flour, and biscuit mixes Regular bread crumbs Instant hot cereals Commercially prepared rice, pasta, or stuffing mixes   Vegetables   Most fresh, frozen, and low-sodium canned vegetables Low-sodium and salt-free vegetable juices Canned vegetables if unsalted or rinsed   Regular canned vegetables and juices, including sauerkraut and pickled vegetables Frozen vegetables with sauces Commercially prepared potato and vegetable mixes   Fruits   Most fresh, frozen, and canned fruits All fruit juices   Fruits processed with salt or sodium   Milk   Nonfat or low-fat (1%) milk Nonfat or low-fat yogurt Cottage cheese, low-fat ricotta, cheeses labeled as low-fat and low-sodium   Whole milk Reduced-fat (2%) milk Malted and chocolate milk Full fat yogurt Most cheeses (unless low-fat and low salt) Buttermilk (no more than 1 cup per week)   Meats and Beans   Lean cuts of fresh or frozen beef, veal, lamb, or pork (look for the word loin) Fresh or frozen poultry without the skin Fresh or frozen fish and some shellfish Egg whites and egg substitutes (Limit whole eggs to three per week) Tofu Nuts or seeds (unsalted, dry-roasted), low-sodium peanut butter Dried peas, beans, and lentils   Any smoked, cured, salted, or canned meat, fish, or poultry (including phillips, chipped beef, cold cuts, hot dogs, sausages, sardines, and anchovies) Poultry skins Breaded and/or fried fish or meats Canned peas, beans, and lentils Salted nuts   Fats and Oils   Olive oil and canola oil Low-sodium, low-fat salad dressings and mayonnaise   Butter, margarine, coconut and palm oils, phillips fat   Snacks, Sweets, and Condiments   Low-sodium or unsalted versions of broths, soups, soy sauce, and condiments Pepper, herbs, and spices; vinegar, lemon, or lime juice Low-fat frozen desserts (yogurt, sherbet, fruit bars) Sugar, cocoa powder, honey, syrup, jam, and preserves Low-fat, trans-fat free cookies, cakes, and pies Avelino and animal crackers, fig bars, fredy snaps   High-fat desserts Broth, soups, gravies, and sauces, made from instant mixes or other high-sodium ingredients Salted snack foods Canned olives Meat tenderizers, seasoning salt, and most flavored vinegars   Beverages   Low-sodium carbonated beverages Tea and coffee in moderation Soy milk   Commercially softened water   Suggestions   Make whole grains, fruits, and vegetables the base of your diet. Choose heart-healthy fats such as canola, olive, and flaxseed oil, and foods high in heart-healthy fats, such as nuts, seeds, soybeans, tofu, and fish. Eat fish at least twice per week; the fish highest in omega-3 fatty acids and lowest in mercury include salmon, herring, mackerel, sardines, and canned chunk light tuna. If you eat fish less than twice per week or have high triglycerides, talk to your doctor about taking fish oil supplements. Read food labels.    For products low in fat and cholesterol, look for fat free, low-fat, cholesterol free, saturated fat free, and trans fat freeAlso scan the Nutrition Facts Label, which lists saturated fat, trans fat, and cholesterol amounts. For products low in sodium, look for sodium free, very low sodium, low sodium, no added salt, and unsalted   Skip the salt when cooking or at the table; if food needs more flavor, get creative and try out different herbs and spices. Garlic and onion also add substantial flavor to foods. Trim any visible fat off meat and poultry before cooking, and drain the fat off after vieira. Use cooking methods that require little or no added fat, such as grilling, boiling, baking, poaching, broiling, roasting, steaming, stir-frying, and sauting. Avoid fast food and convenience food. They tend to be high in saturated and trans fat and have a lot of added salt. Talk to a registered dietitian for individualized diet advice. Last Reviewed: March 2011 Tevin Beckman MS, MPH, RD   Updated: 3/29/2011   ·     Keep Your Memory Blondie Citlali       Many factors can affect your ability to remembera hectic lifestyle, aging, stress, chronic disease, and certain medicines. But, there are steps you can take to sharpen your mind and help preserve your memory. Challenge Your Brain   Regularly challenging your mind may help keeps it in top shape. Good mental exercises include:   Crossword puzzlesUse a dictionary if you need it; you will learn more that way. Brainteasers Try some! Crafts, such as wood working and sewing   Hobbies, such as gardening and building model airplanes   SocializingVisit old friends or join groups to meet new ones. Reading   Learning a new language   Taking a class, whether it be art history or monae chi   TravelingExperience the food, history, and culture of your destination   Learning to use a computer   Going to museums, the theater, or thought-provoking movies   Changing things in your daily life, such as reversing your pattern in the grocery store or brushing your teeth using your nondominant hand   Use Memory Aids   There is no need to remember every detail on your own.  These memory aids can help:   Calendars and day planners   Electronic organizers to store all sorts of helpful informationThese devices can \"beep\" to remind you of appointments. A book of days to record birthdays, anniversaries, and other occasions that occur on the same date every year   Detailed \"to-do\" lists and strategically placed sticky notes   Quick \"study\" sessionsBefore a gathering, review who will be there so their names will be fresh in your mind. Establish routinesFor example, keep your keys, wallet, and umbrella in the same place all the time or take medicine with your 8:00 AM glass of juice   Live a Healthy Life   Many actions that will keep your body strong will do the same for your mind. For example:   Talk to Your Doctor About Herbs and Supplements    Malnutrition and vitamin deficiencies can impair your mental function. For example, vitamin B12 deficiency can cause a range of symptoms, including confusion. But, what if your nutritional needs are being met? Can herbs and supplements still offer a benefit? Researchers have investigated a range of natural remedies, such as ginkgo , ginseng , and the supplement phosphatidylserine (PS). So far, though, the evidence is inconsistent as to whether these products can improve memory or thinking. If you are interested in taking herbs and supplements, talk to your doctor first because they may interact with other medicines that you are taking. Exercise Regularly    Among the many benefits of regular exercise are increased blood flow to the brain and decreased risk of certain diseases that can interfere with memory function. One study found that even moderate exercise has a beneficial effect. Examples of \"moderate\" exercise include:   Playing 18 holes of golf once a week, without a cart   Playing tennis twice a week   Walking one mile per day   Manage Stress    It can be tough to remember what is important when your mind is cluttered.  Make time for relaxation. Choose activities that calm you down, and make it routine. Manage Chronic Conditions    Side effects of high blood pressure , diabetes, and heart disease can interfere with mental function. Many of the lifestyle steps discussed here can help manage these conditions. Strive to eat a healthy diet, exercise regularly, get stress under control, and follow your doctor's advice for your condition. Minimize Medications    Talk to your doctor about the medicines that you take. Some may be unnecessary. Also, healthy lifestyle habits may lower the need for certain drugs. Last Reviewed: April 2010 Rea Opitz, MD   Updated: 4/13/2010   ·     [address]       As we get older, changes in balance, gait, strength, vision, hearing, and cognition make even the most youthful senior more prone to accidents. Falls are one of the leading health risks for older people. This increased risk of falling is related to:   Aging process (eg, decreased muscle strength, slowed reflexes)   Higher incidence of chronic health problems (eg, arthritis, diabetes) that may limit mobility, agility or sensory awareness   Side effects of medicine (eg, dizziness, blurred vision)especially medicines like prescription pain medicines and drugs used to treat mental health conditions   Depending on the brittleness of your bones, the consequences of a fall can be serious and long lasting. Home Life   Research by the Association of Aging Located within [facility]) shows that some home accidents among older adults can be prevented by making simple lifestyle changes and basic modifications and repairs to the home environment. Here are some lifestyle changes that experts recommend:   Have your hearing and vision checked regularly. Be sure to wear prescription glasses that are right for you. Speak to your doctor or pharmacist about the possible side effects of your medicines. A number of medicines can cause dizziness.    If you have problems with sleep, talk to your doctor. Limit your intake of alcohol. If necessary, use a cane or walker to help maintain your balance. Wear supportive, rubber-soled shoes, even at home. If you live in a region that gets wintry weather, you may want to put special cleats on your shoes to prevent you from slipping on the snow and ice. Exercise regularly to help maintain muscle tone, agility, and balance. Always hold the banister when going up or down stairs. Also, use  bars when getting in or out of the bath or shower, or using the toilet. To avoid dizziness, get up slowly from a lying down position. Sit up first, dangling your legs for a minute or two before rising to a standing position. Overall Home Safety Check   According to the Consumer [employer] Safety [employer]'s \"Older Consumer Home Safety Checklist,\" it is important to check for potential hazards in each room. And remember, proper lighting is an essential factor in home safety. If you cannot see clearly, you are more likely to fall. Important questions to ask yourself include:   Are lamp, electric, extension, and telephone cords placed out of the flow of traffic and maintained in good condition? Have frayed cords been replaced? Are all small rugs and runners slip resistant? If not, you can secure them to the floor with a special double-sided carpet tape. Are smoke detectors properly locatedone on every floor of your home and one outside of every sleeping area? Are they in good working order? Are batteries replaced at least once a year? Do you have a well-maintained carbon monoxide detector outside every sleeping are in your home? Does your furniture layout leave plenty of space to maneuver between and around chairs, tables, beds, and sofas? Are hallways, stairs and passages between rooms well lit? Can you reach a lamp without getting out of bed? Are floor surfaces well maintained?  Shag rugs, high-pile carpeting, tile floors, and polished mobility, bathtub transfer benches allow you to slide safely into the tub. Raised toilet seats and toilet safety rails are helpful for those with knee or hip problems. In the [facility])    Make sure you use a nightlight and that the area around your bed is clear of potential obstacles. Be careful with electric blankets and never go to sleep with a heating pad, which can cause serious burns even if on a low setting. Use fire-resistant mattress covers and pillows, and NEVER smoke in bed. Keep a phone next to the bed that is programmed to dial 911 at the push of a button. If you have a chronic condition, you may want to sign on with an automatic call-in service. Typically the system includes a small pendant that connects directly to an emergency medical voice-response system. You should also make arrangements to stay in contact with someonefriend, neighbor, family memberon a regular schedule. Fire Prevention   According to the [employer] (Smoke Alarms for Every) [address] [place] [address], senior citizens are one of the two highest risk groups for death and serious injuries due to residential fires. When cooking, wear short-sleeved items, never a bulky long-sleeved robe. The [facility]'s Safety Checklist for Older Consumers emphasizes the importance of checking basements, garages, workshops and storage areas for fire hazards, such as volatile liquids, piles of old rags or clothing and overloaded circuits. Never smoke in bed or when lying down on a couch or recliner chair. Small portable electric or kerosene heaters are responsible for many home fires and should be used cautiously if at all. If you do use one, be sure to keep them away from flammable materials. In case of fire, make sure you have a pre-established emergency exit plan. Have a professional check your fireplace and other fuel-burning appliances yearly.     Helping Hands   Baby boomers entering the negrete years will continue to see the development of new products to help older adults live safely and independently in spite of age-related changes. Making Life More Livable  , by [employer], lists over 1,000 products for \"living well in the mature years,\" such as bathing and mobility aids, household security devices, ergonomically designed knives and peelers, and faucet valves and knobs for temperature control. Medical supply stores and organizations are good sources of information about products that improve your quality of life and insure your safety.      Last Reviewed: November 2009 Pollo Mensah MD   Updated: 3/7/2011     ·

## 2021-08-02 NOTE — PROGRESS NOTES
ORTHOPAEDIC SURGERY H&P / CONSULTATION NOTE    Chief complaint:   Chief Complaint   Patient presents with    Shoulder Injury     right shoulder pain        History of present illness: The patient is a 76 y.o. female right hand dominant with subjective symptoms of right shoulder pain. The chief complaint is located at right shoulder. Duration of symptoms has been for 2 weeks. The severity of symptoms is rated at 1/10 pain on intake form. Patient ultimately had lost her balance and fell and slipped and attempted to catch herself falling. Injury had occurred where she had difficulty moving her shoulder and ultimately went to the emergency room. She was found to have a closed dislocation of her right proximal humerus with fracture associated. Emergency room ultimately reduced the dislocation and placed her in a sling. She is accompanied by her daughter today. Injury had occurred on 19 July. Denies prior injury right shoulder    The patient has tried the below listed items prior to today's consultation for above listed chief complaint.     -   Over-the-counter anti-inflammatories/prescription medication anti-inflammatory.      -   Physical therapy / guided home exercise program -     -   Previous corticosteroid injections    Past medical history:    Past Medical History:   Diagnosis Date    Hyperlipidemia     Hyperlipidemia associated with type 2 diabetes mellitus ([address] 10/5/2015    Hypertension     Morbid obesity with BMI of 40.0-44.9, adult ([address] 10/5/2015    Osteoarthrosis, not specified whether generalized/localized, lower leg     Paranoid type schizophrenia ([address]     Type 2 diabetes mellitus without complication ([facility] 88/8/9562    Type II or unspecified type diabetes mellitus without mention of complication, not stated as uncontrolled         Past surgical history:    Past Surgical History:   Procedure Laterality Date    BLEPHAROPLASTY  5/10/2012    COLONOSCOPY  8/1/2007    JOINT REPLACEMENT Allergies: Allergies   Allergen Reactions    Tessalon [Benzonatate]     Diazepam     Diclofenac Sodium     Lycopene      Multiv-Min-FA-Lycopene-Lutein    Metformin     Olanzapine     Tape Fransisco Lord Tape]     Zocor [Simvastatin - High Dose]          Medications:   Current Outpatient Medications:     amLODIPine (NORVASC) 5 MG tablet, Take 1 tablet by mouth daily, Disp: 30 tablet, Rfl: 3    pioglitazone (ACTOS) 30 MG tablet, TAKE 1 TABLET BY MOUTH ONCE DAILY, Disp: 90 tablet, Rfl: 0    pravastatin (PRAVACHOL) 40 MG tablet, TAKE 1 TABLET BY MOUTH ONCE DAILY, Disp: 90 tablet, Rfl: 0    glimepiride (AMARYL) 4 MG tablet, TAKE 1 TABLET BY MOUTH ONCE DAILY IN THE MORNING BEFORE BREAKFAST, Disp: 90 tablet, Rfl: 0    hydroCHLOROthiazide (HYDRODIURIL) 25 MG tablet, TAKE 1 TABLET BY MOUTH EVERY DAY, Disp: 90 tablet, Rfl: 0    valsartan (DIOVAN) 160 MG tablet, TAKE 1 TABLET BY MOUTH  DAILY, Disp: 90 tablet, Rfl: 1    Accu-Chek Softclix Lancets MISC, USE TO TEST TWICE A DAY. DX:E11.9, Disp: 100 each, Rfl: 11    blood glucose test strips (ACCU-CHEK NEY PLUS) strip, USE TO TEST TWICE A DAY. DX: E11.9, Disp: 100 strip, Rfl: 11    Calcium Carb-Cholecalciferol (CALCIUM PLUS VITAMIN D3) 600-800 MG-UNIT TABS, Take by mouth 2 times daily, Disp: , Rfl:     Wheat Dextrin (BENEFIBER PO), Take by mouth as needed, Disp: , Rfl:     acetaminophen (TYLENOL) 325 MG tablet, Take 325 mg by mouth as needed for Pain, Disp: , Rfl:     busPIRone (BUSPAR) 10 MG tablet, Take 10 mg by mouth 2 times daily, Disp: , Rfl:     Blood Glucose Monitoring Suppl (ACCU-CHEK NEY PLUS) w/Device KIT, Check twice daily. DX: E11.9, Disp: 1 kit, Rfl: 0    Lancets MISC, One every day, Disp: 100 each, Rfl: 2    aspirin 81 MG EC tablet, Take 81 mg by mouth daily. , Disp: , Rfl:     risperidone (RISPERDAL) 2 MG tablet, Take 2 mg by mouth daily. , Disp: , Rfl:      Social history: Denies IV drug use.     Social History     Socioeconomic History    Marital status:      Spouse name: Not on file    Number of children: Not on file    Years of education: Not on file    Highest education level: Not on file   Occupational History    Not on file   Tobacco Use    Smoking status: Never Smoker    Smokeless tobacco: Never Used   Vaping Use    Vaping Use: Never used   Substance and Sexual Activity    Alcohol use: No    Drug use: No    Sexual activity: Not Currently     Partners: Male   Other Topics Concern    Not on file   Social History Narrative    Not on file     Social Determinants of Health     Financial Resource Strain:     Difficulty of Paying Living Expenses:    Food Insecurity:     Worried About Running Out of Food in the Last Year:     [address] in the Last Year:    Transportation Needs:     Lack of Transportation (Medical):  Lack of Transportation (Non-Medical):    Physical Activity: Unknown    Days of Exercise per Week: 0 days    Minutes of Exercise per Session: Not on file   Stress:     Feeling of Stress :    Social Connections:     Frequency of Communication with Friends and Family:     Frequency of Social Gatherings with Friends and Family:     Attends [employer]oration[employer] Services:     Active Member of Clubs or Organizations:     Attends Club or Organization Meetings:     Marital Status:    Intimate Partner Violence:     Fear of Current or Ex-Partner:     Emotionally Abused:     Physically Abused:     Sexually Abused: Tobacco use. Social History     Tobacco Use   Smoking Status Never Smoker   Smokeless Tobacco Never Used     Employment: Noncontributory    Workers compensation claim: Noncontributory    Review of systems: Patient denies any fevers chills chest pain shortness of breath nausea vomiting significant weight loss any change in voiding or bowel movements. Patient denies any significant numbness or tingling at baseline as it relates to this presenting symptom/chief complaint.  The patient denies any significant problems with skin or any significant allergies. Physical examination:  Body mass index is 43.46 kg/m². AAOx3, NCAT  EOMI  MMM  RR  Unlabored breathing, no wheezing  Skin intact BUE and BLE, warm and moist  Bilateral upper extremity examination specific to subjective symptoms  Exam Right Shoulder   Active Range of Motion deferred given known recent fracture/dislocation  Skin intact throughout  5/5 B T G IO EPL  SILT Ax, R, U, M  +2 radial pulse    Diagnostic imaging:  MY READ:  Radiographs 4 view today right shoulder 8/2/2021: Reduced glenohumeral joint. Positive comminution with minimal displacement greater tuberosity fracture. Subtle ossific calcification axillary recess  CT scan 7/20/2021: Reduced fracture segments with minimal displacement greater tuberosity fracture. This is seated within the voided fracture site to satisfaction. GH joint reduced. Ossific bone debris/segment inferior axillary recess. Difficult to discern if donor from anterior glenoid given scanned obliquity    Pertinent lab work:  None       Diagnosis Orders   1. Closed displaced fracture of greater tuberosity of right humerus, initial encounter     2. Anterior dislocation of right shoulder, initial encounter  Breg Sling Shot 2 Shoulder Sling   3. Acute pain of right shoulder  XR SHOULDER RIGHT (MIN 2 VIEWS)       Assessment and plan: 76 y.o. female with current subjective symptoms and physical exam findings with diagnostic imaging correlating to right shoulder greater tuberosity fracture with comminution status post fracture dislocation right glenohumeral joint.  -Time of 23 minutes was spent coordinating and discussing the clinical findings, reviewing diagnostic imaging as indicated, coordinating care with prior notes review and current clinical encounter documentation as it pertains to the patient's presenting subjective symptoms and diagnoses.   -I reviewed with the patient the imaging findings as well as clinical exam and  how it correlates to subjective symptoms. I took additional time to review o/p ER visit notes and prior imaging/CT scans  -I had a pleasant discussion with the patient today. I also reviewed with her daughter who accompanies her that currently fracture comminution segment positions are reasonably well positioned within the greater tuberosity area. There is minimal displacement there. There is no surgical indication in this regard. There is also inferior to the glenoid and axillary recess bone debris/possible glenoid rim bony Bankart fracture. Difficult to fully discern this.  -I reviewed with him both that this is not unreasonable to consider nonoperative conservative care treatment options given the glenohumeral joint is now reduced and the greater tuberosity fracture remains segments are reduced as well. I did review with her the generalized natural etiology and natural history for glenohumeral dislocations especially at her stated age with regard to OA development and further instability. I reviewed with him both relative stiffness that can occur both with and without surgical treatments. I do expect the small bone segment that is inferior to the glenoid to resorb to satisfaction  -I would like to see how she does with allowing the fracture comminution and segments to heal in. We will transition her to a sling with pillow to be utilized for roughly 6 to 8 weeks post injury.  -She will follow up in 1 week with a 4 view right shoulder to include substituting a [place] view instead of an axillary lateral  -That will be roughly then a 2-week postop appointment pending how things look, physical therapy will be placed as a referral however I do not want her starting AAROM and PROM with  Pain modalities until 4 to 5 weeks post injury.   -OTC Tylenol per bottle as needed discomfort  -All questions answered to the patient's satisfaction and the patient expressed understanding and agreement with the above listed treatment plan  -Follow up 1 week with radiographs per the above  -Thank you for the clinical consultation and allowing me to participate in the patient's care. Electronically signed by Shireen Archer MD on 8/2/21 at 1:26 PM EDT         Shireen Archer MD       Orthopaedic Surgery-Sports Medicine        Disclaimer: This note was dictated with voice recognition software. Though review and correction are routinely performed, please contact the office/medical records for any errors requiring correction.

## 2021-08-02 NOTE — PROGRESS NOTES
Medicare Annual Wellness Visit  Name: Douglas Vaughan Date: 2021   MRN: [id number] Sex: Female   Age: 76 y.o. Ethnicity: Non- / Non    : 1945 Race: White (non-)      Analy Randolph is here for Medicare AWV    Screenings for behavioral, psychosocial and functional/safety risks, and cognitive dysfunction are all negative except as indicated below. These results, as well as other patient data from the [address] form, are documented in Flowsheets linked to this Encounter. Patient is here for follow up of diabetes, hypertension, hyperlipidemia, OA, schizophrenia and heel pain. Patient's BGs consistently in an acceptable range. Patient does not have polydipsia and polyuria. She has been checking her sugar bid. Her sugars in am are 100-140 mg/dl with 89 mg/dl being the lowest. Her diabetes is well controlled. She is only taking her Amaryl daily. No hypoglycemia. No vision issues. No numbness or tingling in her feet. Patient's BP is controlled on current medications. No chest pain or dyspnea. Med compliant. Her cholesterol is at goal. No myalgias. She has mild edema.         Allergies   Allergen Reactions    Tessalon [Benzonatate]     Diazepam     Diclofenac Sodium     Lycopene      Multiv-Min-FA-Lycopene-Lutein    Metformin     Olanzapine     Tape Fransisco Lord Tape]     Zocor [Simvastatin - High Dose]          Prior to Visit Medications    Medication Sig Taking?  Authorizing Provider   amLODIPine (NORVASC) 5 MG tablet Take 1 tablet by mouth daily  Yanique Schrader MD   pioglitazone (ACTOS) 30 MG tablet TAKE 1 TABLET BY MOUTH ONCE DAILY  Linnette Hopson MD   pravastatin (PRAVACHOL) 40 MG tablet TAKE 1 TABLET BY MOUTH ONCE DAILY  Linnette Hopson MD   glimepiride (AMARYL) 4 MG tablet TAKE 1 TABLET BY MOUTH ONCE DAILY IN THE MORNING BEFORE BREAKFAST  Linnette Hopson MD   hydroCHLOROthiazide (HYDRODIURIL) 25 MG tablet TAKE 1 Liliana Camarena MD   valsartan (DIOVAN) 160 MG tablet TAKE 1 TABLET BY MOUTH  DAILY  Sudha Denton MD   Accu-Chek Softclix Lancets MISC USE TO TEST TWICE A DAY. DX:E11.9  Sudha Denton MD   blood glucose test strips (ACCU-CHEK NEY PLUS) strip USE TO TEST TWICE A DAY. DX: E11.9  Sudha Denton MD   Calcium Carb-Cholecalciferol (CALCIUM PLUS VITAMIN D3) 600-800 MG-UNIT TABS Take by mouth 2 times daily  Historical Provider, MD   Wheat Dextrin (BENEFIBER PO) Take by mouth as needed  Historical Provider, MD   acetaminophen (TYLENOL) 325 MG tablet Take 325 mg by mouth as needed for Pain  Historical Provider, MD   busPIRone (BUSPAR) 10 MG tablet Take 10 mg by mouth 2 times daily  Historical Provider, MD   Blood Glucose Monitoring Suppl (ACCU-CHEK NEY PLUS) w/Device KIT Check twice daily. DX: E11.9  Sudha Denton MD   Lancets MISC One every day  Sudha Denton MD   aspirin 81 MG EC tablet Take 81 mg by mouth daily. Historical Provider, MD   risperidone (RISPERDAL) 2 MG tablet Take 2 mg by mouth daily.     Historical Provider, MD         Past Medical History:   Diagnosis Date    Hyperlipidemia     Hyperlipidemia associated with type 2 diabetes mellitus ([facility] 10/5/2015    Hypertension     Morbid obesity with BMI of 40.0-44.9, adult ([facility] [facility] 10/5/2015    Osteoarthrosis, not specified whether generalized/localized, lower leg     Paranoid type schizophrenia ([facility]     Type 2 diabetes mellitus without complication ([facility] 81/0/9790    Type II or unspecified type diabetes mellitus without mention of complication, not stated as uncontrolled        Past Surgical History:   Procedure Laterality Date    BLEPHAROPLASTY  5/10/2012    COLONOSCOPY  8/1/2007    JOINT REPLACEMENT           Family History   Problem Relation Age of Onset    Cancer Sister         Ovarian cancer    Dementia Brother        CareTeam (Including outside providers/suppliers regularly involved in providing care):   Patient Care Team:  Dennis Tong MD as PCP - Varghese Kiran MD as PCP - [facility] Empaneled Provider  Adrienne Camp MD (Ophthalmology)  Bart Keene RN as Care Transitions Nurse    Wt Readings from Last 3 Encounters:   08/02/21 226 lb (102.5 kg)   07/21/21 236 lb 6.4 oz (107.2 kg)   01/19/21 238 lb (108 kg)     Vitals:    08/02/21 0903   BP: 120/80   Site: Left Upper Arm   Position: Sitting   Cuff Size: Large Adult   Pulse: 70   Resp: 12   Weight: 226 lb (102.5 kg)   Height: 5' 1\" (1.549 m)     Body mass index is 42.7 kg/m². Based upon direct observation of the patient, evaluation of cognition reveals recent and remote memory intact.     General Appearance: alert and oriented to person, place and time, well developed and well- nourished, in no acute distress  Skin: warm and dry, no rash or erythema  Head: normocephalic and atraumatic  Eyes: pupils equal, round, and reactive to light, extraocular eye movements intact, conjunctivae normal  ENT: tympanic membrane, external ear and ear canal normal bilaterally, nose without deformity, nasal mucosa and turbinates normal without polyps  Neck: supple and non-tender without mass, no thyromegaly or thyroid nodules, no cervical lymphadenopathy  Pulmonary/Chest: clear to auscultation bilaterally- no wheezes, rales or rhonchi, normal air movement, no respiratory distress  Cardiovascular: normal rate, regular rhythm, normal S1 and S2, no murmurs, rubs, clicks, or gallops, distal pulses intact, no carotid bruits  Abdomen: soft, non-tender, non-distended, normal bowel sounds, no masses or organomegaly  Extremities: no cyanosis, clubbing or edema  Musculoskeletal: normal range of motion, no joint swelling, deformity or tenderness  Neurologic: reflexes normal and symmetric, no cranial nerve deficit, gait, coordination and speech normal    Patient's complete Health Risk clutter?: Yes  Do you always fasten your seatbelt when you are in a car?: Yes  Safety Interventions:  · Home safety tips provided    ADL:  ADLs  In the past 7 days, did you need help from others to perform any of the following everyday activities? Eating, dressing, grooming, bathing, toileting, or walking/balance?: (!) Dressing, Bathing  In the past 7 days, did you need help from others to take care of any of the following?  Laundry, housekeeping, banking/finances, shopping, telephone use, food preparation, transportation, or taking medications?: [employer], Housekeeping, Shopping, Transportation  ADL Interventions:  · she has help    Personalized Preventive Plan   Current Health Maintenance Status  Immunization History   Administered Date(s) Administered    COVID-19, Moderna, PF, 100mcg/0.5mL 01/23/2021, 02/22/2021    Influenza 09/06/2011, 09/10/2012, 09/13/2013    Influenza Virus Vaccine 09/18/2014, 10/05/2015, 09/09/2019, 10/05/2020    Influenza Whole 11/19/2010    Influenza, High Dose (Fluzone 65 yrs and older) 10/10/2016, 10/30/2017, 10/05/2018    Pneumococcal Conjugate 13-valent ([id number]) 10/05/2015    Pneumococcal Polysaccharide ([id number]) 09/10/2012    Zoster Live (Zostavax) 09/25/2013        Health Maintenance   Topic Date Due    DTaP/Tdap/Td vaccine (1 - Tdap) Never done    Shingles Vaccine (2 of 3) 11/20/2013    Annual Wellness Visit (AWV)  Never done    Flu vaccine (1) 09/01/2021    Lipid screen  09/03/2021    Diabetic foot exam  01/19/2022    A1C test (Diabetic or Prediabetic)  01/19/2022    Potassium monitoring  07/20/2022    Creatinine monitoring  07/20/2022    Diabetic retinal exam  10/14/2022    Colon cancer screen colonoscopy  08/08/2027    Pneumococcal 65+ years Vaccine  Completed    COVID-19 Vaccine  Completed    Hepatitis C screen  Completed    DEXA (modify frequency per FRAX score)  Addressed    Hepatitis A vaccine  Aged Out    Hib vaccine  Aged C/ [address] 19 Meningococcal (ACWY) vaccine  Aged Out     Recommendations for Preventive Services Due: see orders and patient instructions/AVS.  . Recommended screening schedule for the next 5-10 years is provided to the patient in written form: see Patient Instructions/AVS.    Yahaira Krishnamurthy was seen today for medicare awv. Diagnoses and all orders for this visit:    Medicare annual wellness visit, subsequent    Type 2 diabetes mellitus without complication, without long-term current use of insulin ([facility]  -     Comprehensive Metabolic Panel; Future  -     CBC Auto Differential; Future  -     Hemoglobin A1C; Future  -     Lipid Panel;  Future  -     POCT Urinalysis no Micro  -     Microalbumin / Creatinine Urine Ratio    Essential hypertension    Hyperlipidemia associated with type 2 diabetes mellitus ([address]    Stage 3a chronic kidney disease ([address]    Morbid obesity with BMI of 40.0-44.9, adult ([address]    Paranoid type schizophrenia ([facility]    Age-related osteoporosis without current pathological fracture

## 2021-08-05 ENCOUNTER — CARE COORDINATION (OUTPATIENT)
Dept: CASE MANAGEMENT | Age: 76
End: 2021-08-05

## 2021-08-05 NOTE — CARE COORDINATION
8/5  [facility]  Pt was a non admit to [facility]. Pt declined SN when RN visited pt at home for [facility]. PT postponed until 4-5 weeks post injury per ortho note from follow up OV.     Electronically signed by Tania Espino RN on 8/5/2021 at 2:51 PM

## 2021-08-05 NOTE — CARE COORDINATION
[facility]    [facility]    Patient: Agustina Huitron  Patient : 1945   MRN: [id number]  Reason for Admission: near syncope vs fall, R humerus fx with anterior dislocation (sling, NWB RUE, f/up Dr Chanda Nunez), mild bradycardia, DM2, hx tardive dyskinesia  Discharge Date: 21 RARS: Readmission Risk Score: 13         Spoke with: Agustina Huitron (patient)    Completed OV with ortho Dr Chanda Nunez. Now in sling with pillow. Her daughters are still helping her at home. States the surgeon told her she should not even peel and slice cucumbers with her RUE because of a loose bone fragment that needs time to absorb. Her main concern is getting showered because her daughters are returning to work (both work at schools). She states [facility] \"left a bunch of paperwork and did nothing\". She is unsure of the role of home care. Said a PT was going to come but she didn't feel she needed PT. CTN recommended [employer] and private pay agencies such as [employer]. States she also needs to discuss with her daughters possibly changing her shower schedule to evenings when they are available. CTN will outreach to [facility] to see if they are active and if so the plan for visits as she would benefit from OT and HHA services. She is in agreement with plan. Outreach to [address] [facility] to ask update on above. The SN visited patient for [facility] on  with daughters present and after discussion they all decided that the patient did not need home care services with her NWB RUE and otherwise very ambulatory. The case was not opened. Outreach to patient to [facility]. Message left requesting return call to discuss.      Care Transitions Subsequent and Final Call    Schedule Follow Up Appointment with PCP: Completed  Subsequent and Final Calls  Do you have any ongoing symptoms?: No  Have your medications changed?: No  Do you have any questions related to your medications?: No  Do you currently have any active services?: Yes  Are you currently active with any services?: Home Health  Do you have any needs or concerns that I can assist you with?: No  Identified Barriers: Impairment  Care Transitions Interventions  Other Interventions:            Follow Up  Future Appointments   Date Time Provider Judy Molina   8/9/2021  9:45 AM Joy Prater MD [facility]   9/14/2021 10:45 AM Mak Griggs MD P [facility]ER [facility]   11/10/2021 10:30 AM Aida Braxton MD [facility] Int None       Galdino Baugh RN

## 2021-08-09 ENCOUNTER — NURSE ONLY (OUTPATIENT)
Dept: INTERNAL MEDICINE CLINIC | Age: 76
End: 2021-08-09

## 2021-08-09 ENCOUNTER — OFFICE VISIT (OUTPATIENT)
Dept: ORTHOPEDIC SURGERY | Age: 76
End: 2021-08-09
Payer: MEDICARE

## 2021-08-09 VITALS — WEIGHT: 230 LBS | HEIGHT: 61 IN | BODY MASS INDEX: 43.43 KG/M2

## 2021-08-09 DIAGNOSIS — S42.251D CLOSED DISPLACED FRACTURE OF GREATER TUBEROSITY OF RIGHT HUMERUS WITH ROUTINE HEALING: Primary | ICD-10-CM

## 2021-08-09 DIAGNOSIS — E11.9 TYPE 2 DIABETES MELLITUS WITHOUT COMPLICATION, WITHOUT LONG-TERM CURRENT USE OF INSULIN (HCC): Primary | ICD-10-CM

## 2021-08-09 DIAGNOSIS — M25.511 ACUTE PAIN OF RIGHT SHOULDER: ICD-10-CM

## 2021-08-09 LAB
BILIRUBIN, POC: NORMAL
BLOOD URINE, POC: NORMAL
CLARITY, POC: NORMAL
COLOR, POC: NORMAL
CREATININE URINE: 125 MG/DL (ref 28–259)
GLUCOSE URINE, POC: NORMAL
KETONES, POC: NORMAL
LEUKOCYTE EST, POC: NORMAL
MICROALBUMIN UR-MCNC: <1.2 MG/DL
MICROALBUMIN/CREAT UR-RTO: NORMAL MG/G (ref 0–30)
NITRITE, POC: NORMAL
PH, POC: NORMAL
PROTEIN, POC: NORMAL
SPECIFIC GRAVITY, POC: NORMAL
UROBILINOGEN, POC: NORMAL

## 2021-08-09 PROCEDURE — 99213 OFFICE O/P EST LOW 20 MIN: CPT | Performed by: ORTHOPAEDIC SURGERY

## 2021-08-09 NOTE — PROGRESS NOTES
FOLLOW UP ORTHOPAEDIC NOTE    The patient follows up today for re-evaluation of right shoulder pain. The patient states she has been nonweightbearing in the sling. She is accompanied by her daughter. They present today at routine follow-up for radiographs. 1/10 pain on intake form    PE:  AAOx3  RR  Unlabored breathing    Pertinent radiographs/imagin view right shoulder 2021: Maintained reduction of greater tuberosity fragmentation/fracture. Reduced glenohumeral joint. Maintain small osseous fragment axillary pouch. Slight callus appreciated lateral proximal humerus greater tuberosity     Diagnosis Orders   1. Closed displaced fracture of greater tuberosity of right humerus with routine healing     2. Acute pain of right shoulder  XR SHOULDER RIGHT (MIN 2 VIEWS)       Assessment and plan: 76 female with continued subjective symptoms of right shoulder pain now 3 weeks post injury with known, correlating diagnosis of right shoulder closed greater tuberosity fracture status post fracture dislocation. -Greater than 50% of the time (/ minutes) was spent coordinating and discussing the clinical findings and diagnostic imaging results as they pertain to the patient's presenting subjective symptoms.  -I had a pleasant discussion with the patient and her daughter. Currently there is no gross interval change with regard to fracture segment/fragmentation segments reduction.  -At this time, I would like to start formal physical therapy in 2 weeks. I would like for continued consolidation of the proximal humerus greater tuberosity fracture to continue prior to starting formal therapy to limit displacement. I reviewed with him both the fine-line between allowing satisfactory fracture consolidation and healing and balancing that with stiffness post injury. I did review with her that there will be likely some stiffness that she will work through with regard to physical therapy.   She is understanding of this and wishes to continue conservative nonoperative care. -Continue OTC Tylenol per bottle as needed discomfort  -She may come out of the sling 3 times daily and rest her arm in her lap and work on passive and active assisted range of motion at the elbow as well as continue to open and close her hand to limit stiffness.  -When she starts formal physical therapy, they will work on gentle progressions with regard to passive range of motion and active assisted range of motion. I do not want any active range of motion starting until roughly 8 weeks post injury.  -All questions answered to the patient's satisfaction and the patient expressed understanding and agreement with the above listed treatment plan  -Follow up 3 weeks with repeat 4 view right shoulder radiograph substituting a Velpeau view instead of axillary lateral.  At that time we will assess to see about weaning out of the sling over 6 to 8-week post injury point with the guidance of therapy  -Thank you for the clinical consultation and allowing me to participate in the patient's care. Electronically signed by William Laura MD on 8/9/21 at 10:38 AM EDT         William Laura MD       Orthopaedic Surgery-Sports Medicine    Disclaimer: This note was dictated with voice recognition software. Though review and correction are routinely performed, please contact the office/medical records for any errors requiring correction.

## 2021-08-12 RX ORDER — AMLODIPINE BESYLATE 5 MG/1
TABLET ORAL
Qty: 30 TABLET | Refills: 2 | Status: SHIPPED | OUTPATIENT
Start: 2021-08-12 | End: 2021-11-08 | Stop reason: SDUPTHER

## 2021-08-13 ENCOUNTER — CARE COORDINATION (OUTPATIENT)
Dept: CASE MANAGEMENT | Age: 76
End: 2021-08-13

## 2021-08-13 NOTE — CARE COORDINATION
[facility] Transitions FINAL Call    2021    Patient: Cathrine Dandy  Patient : 1945   MRN: [id number]  Reason for Admission: near syncope vs fall, R humerus fx with anterior dislocation (sling, NWHIRA LÓPEZ, f/up Dr Keon Kaufman), mild bradycardia, DM2, hx tardive dyskinesia  Discharge Date: 21 RARS: Readmission Risk Score: 13         Spoke with: Cathrine Dandy (patient)    Reports doing well. Has plans to start OP therapy and allowed out of sling for ROM but limited. Her  hurt his hand and unable to assist her but daughter's have plan as they return to work to provide additional support. She declines referral to  for resources. She has CTN contact number for future needs. Care Transitions Subsequent and Final Call    Schedule Follow Up Appointment with PCP: Completed  Subsequent and Final Calls  Do you have any ongoing symptoms?: No  Have your medications changed?: No  Do you have any questions related to your medications?: No  Do you currently have any active services?: No  Do you have any needs or concerns that I can assist you with?: No  Identified Barriers: Impairment  Care Transitions Interventions  Other Interventions:            Follow Up  Future Appointments   Date Time Provider Judy Molina   2021  9:00 AM Tabitha Gamino PT [facility] HOD   2021 10:30 AM Amanda Hutton PT [facility]   2021  9:45 AM Luis A Kessler MD [facility]   2021  9:45 AM Tabitha Gamino, PT [facility] Rodolfo Ramirez [facility]   2021  9:00 AM Tabitha Gamino PT [facility] H[facility]   2021  9:45 AM [facility]   2021 10:30 AM Boneta Allegra [facility]   2021  9:45 AM Boneta Allegra [facility]   2021 10:45 AM Jayden Gonzalez MD P [facility]   2021 10:30 AM Boneta Allegra [address] [facility]   2021  9:00 AM Tabitha Gamino, [address] [place] [address] Ashley MART   9/23/2021  9:00 AM Gustavo Camarena [facility] Pascual Dalton [facility]   11/10/2021 10:30 AM MD Ashley Gannon Int None       Guero De La Paz RN

## 2021-08-25 ENCOUNTER — HOSPITAL ENCOUNTER (OUTPATIENT)
Dept: PHYSICAL THERAPY | Age: 76
Setting detail: THERAPIES SERIES
Discharge: HOME | End: 2021-08-25
Payer: MEDICARE

## 2021-08-25 PROCEDURE — 97016 VASOPNEUMATIC DEVICE THERAPY: CPT

## 2021-08-25 PROCEDURE — 97162 PT EVAL MOD COMPLEX 30 MIN: CPT

## 2021-08-25 PROCEDURE — 97140 MANUAL THERAPY 1/> REGIONS: CPT

## 2021-08-25 PROCEDURE — 97110 THERAPEUTIC EXERCISES: CPT

## 2021-08-25 NOTE — FLOWSHEET NOTE
[address] [place] [address] [facility] MICHELLE Aceves [address]  Phone [phone]  Fax [phone]    Outpatient Physical Therapy     [x] Daily Treatment Note   [] Progress Note   [] Discharge Note    Date:  8/25/2021    Patient Name:  Ildefonso Mauricio         YOB: 1945    Medical Diagnosis: Closed displaced fx of greater tuberosity of R humerus with routine healing (S42.042D)         Treatment Diagnosis: limited ROM, weakness, poor posture, swelling, decreased functional status                          Onset Date:    7/19/21  (DOI)               Referral Date:  8/9/21                 Referring Physician: Mayela Chin MD                                                    Visits Allowed/Insurance/Certification Information: [facility] Medicare, med Bety Mansfield, no auth     Restrictions/Precautions: no AROM R shoulder until 8 weeks post-injury, PROM, AAROM only    Plan of care sent to provider:   []NA   []Faxed   [x]Co-signature       Plan of care signed:    [x]NA   []Yes   []No      Progress report will be due (10 Rx or 30 days whichever is less): 3/64/73     Recertification will be due (POC Duration  / 90 days whichever is less): 11/25/21     Visit# / total visits:     Visit # Insurance Allowable Auth Required   In Person 1/24 Med nec []  Yes     [x]  No    Tele Health 0  []  Yes     []  No    Total 1/24       Plan for Next Session:  Continue with PROM, add AAROM for HEP      Subjective:  See eval     Pain level:   AT EVAL: Patient describes pain to be intermittent in R shoulder in proximal humerus  Patient reports pain is  0/10 pain at present and  4/10 pain at its worst.  Worsened by: Any kind of movement of R arm        Objective:       Exercises:    Exercises in bold performed in department today. Items not bolded are carried forward from prior visits for continuity of the record.   Exercise/Equipment Resistance/Repetitions Issued for [employer] pulleys       AROM elbow flex/ext  Wrist flex/ext  Pronation/supination x10 each direction in sitting or supine x      strength  Yellow foam x   Supine back pocket shrugs x10 x     codmans x10 x                                                                                                   Therapeutic Exercise/Home Exercise Program:   x15 min. Patient educated on eval findings, plan of care, and goals. Instructed in HEP with handout given. Group Therapy:    0 minutes    Therapeutic Activity:  0 minutes     Gait: 0 minutes    Neuromuscular Re-Education:  0 minutes      Canalith Repositioning Procedure:  0 minutes    Manual Therapy:  15 minutes  PROM:  R shoulder in supine, flex, abd, ER, IR, elbow flex/ext, sup/pronation  Gentle inf oscillations for relaxation and pain relief    Modalities: 15 minutes   [x] GAME READY (VASO)- for significant edema, swelling, pain control, 38 deg, min pressure, with patient seated     Functional Outcome Measure:   []NA  Measure Used: Megha Morse  Date Assessed: 8/25/21  Score:  70%    Assessment/Treatment/Activity Tolerance:    Patients response to treatment:   [x]Patient tolerated treatment well with no adverse reactions noted    []Patient limited by fatigue   []Patient limited by pain    []Patient limited by other medical complications   [x]Other: pain 0/10 after treatment    Goals:   Progress towards goals:  Goals established on 8/25/21  GOALS:  Short Term Goals:  2 weeks  1. Independent in HEP and progression per patient tolerance, in order to prevent re-injury. []? Progressing: []? Met: []? Not Met: []? Adjusted       2. Patient will have a decrease in pain to facilitate improvement in movement, function, and ADLs as indicated by Functional Deficits. []? Progressing: []? Met: []? Not Met: []? Adjusted          Long Term Goals:  12 weeks  1. Disability index score of 20% or less for the [employer] to assist with reaching prior level of function. []?  Progressing: []? Met: []? Not Met: []? Adjusted      2. Patient will demonstrate increased AROM R shoulder = L shoulder to allow for proper joint functioning as indicated by patients Functional Deficits. []? Progressing: []? Met: []? Not Met: []? Adjusted       3. Patient will demonstrate an increase in strength to 4/5 or greater in R shoulder and UE to allow for proper functional mobility as indicated by patients Functional Deficits. []? Progressing: []? Met: []? Not Met: []? Adjusted       4. Patient will return to all transfers, work activities, and functional activities without increased symptoms or restriction. []? Progressing: []? Met: []? Not Met: []? Adjusted       5. Patient will have 0-2/10 pain with ADL's.  []? Progressing: []? Met: []? Not Met: []? Adjusted       6. Patient stated goal: sleep through the night without waking due to pain  []? Progressing: []? Met: []? Not Met: []? Adjusted    Prognosis: [x]Good   []Fair   []Poor    Patient Requires Follow-up:  [x]Yes  []No    Plan: [x]Plan of care initiated     []Continue per plan of care    [] Alter current plan (see comments)    []Hold pending MD visit []Discharge    Charges:  Timed Code Treatment Minutes: 30   Total Treatment Minutes:  60   BWC time for each procedure?:  TE TIME:  NMR TIME:  MANUAL TIME:  UNTIMED MINUTES:       [] EVAL (LOW) 86250 (typically 20 minutes face-to-face)  [x] EVAL (MOD) 67228 (typically 30 minutes face-to-face)  [] EVAL (HIGH) 89064 (typically 45 minutes face-to-face)  [] RE-EVAL     [x] CZ(13162) x1     [] IONTO  [] NMR (48543) x     [x] VASO  [x] Manual (13354) x1     [] Other:  [] TA x      [] Mech Traction (67071)  [] ES(attended) (81509)     [] ES (un) (50726): Medicare Cap total YTD:   [x]N/A    Electronically signed by:  Elizabeth Juan PT, MPT, OMT-C [id number]      Note: If patient does not return for scheduled/ recommended follow up visits, this note will serve as a discharge from care along with most recent update on progress.

## 2021-08-25 NOTE — PROGRESS NOTES
[facility], [facility], [address]  Phone: [phone]                                                    Physical Therapy Certification    We had the pleasure of evaluating the following patient for physical therapy services at [address] [place] [address]. A summary of our findings can be found in the initial assessment below. This includes our plan of care. If you have any questions or concerns regarding these findings, please do not hesitate to contact me at the office phone number checked above. Thank you for the referral.       Physician Signature:_______________________________Date:__________________  By signing above (or electronic signature), therapists plan is approved by physician     [facility] PHYSICAL THERAPY EVALUATION    Patient: Lily Garcia   :    MRN: [id number]     Medical Diagnosis: Closed displaced fx of greater tuberosity of R humerus with routine healing (S42.251D)     Treatment Diagnosis: limited ROM, weakness, poor posture, swelling, decreased functional status      Onset Date: 21     Referral Date:  21     Referring Physician: Leigh Ann Le MD        Visits Allowed/Insurance/Certification Information: [facility] Medicare, jessi Godfrey, no auth    Restrictions/Precautions: no AROM R shoulder until 8 weeks post-injury, PROM, AAROM only    C-SSRS Triggered by Intake questionnaire (Past 2 wk assessment):   [x] No, Questionnaire did not trigger screening.   [] Yes, Patient intake triggered further evaluation      [] C-SSRS Screening completed  [] PCP notified via Plan of Care  [] Emergency services notified     Pt Occupation/Job Duties:  Retired.   Enjoys family time    Social support/Environment:     Family/caregiver support:   [x]Yes, lives with  who just injured his hand   []No    Health History reviewed with pt:   [x]Yes   []No      PMH:  HLD, DM, HTN, OA, morbid obesity, paranoid schizophrenia (controlled with meds), B TKR 17 years ago, hysterectomy, anxiety    SUBJECTIVE FINDINGS        History of Present Illness:   Patient reports she fell on 21 in her kitchen when she turned quickly, lost her balance, and fell. She was transported to [facility] by EMS. She was admitted with R humerus fx with ant dislocation, mild bradycardia, and DM. She was there 2 days, and then discharged to home, and was referred to ortho. Planning to treat with conservative care at this time. Patient wearing a sling with pillow for 6-8 weeks post-injurty     21 xrays CT R shoulder:  Impression   1. Acute Hill-Sachs fracture deformity/concavity with slightly displaced   right greater tuberosity fracture.  Fracture fragments of the axillary pouch   and inferior to the glenoid likely arising from the anterior inferior glenoid. 2. Articular surface of the medial humeral head properly articulates with the   glenoid consistent with reduction. 3. Respiratory motion and dependent atelectasis in the right lung. 4. Moderate degenerative changes of the right AC joint. Had xrays on 21 in office with results as follows:   Pertinent radiographs/imagin view right shoulder 2021: Maintained reduction of greater tuberosity fragmentation/fracture. Reduced glenohumeral joint. Maintain small osseous fragment axillary pouch. Slight callus appreciated lateral proximal humerus greater tuberosity   Patient now referred for PT treatment. Per MD note on 21 MD states, [place] Can may come out of the sling 3 times daily and rest her arm in her lap and work on passive and active assisted range of motion at the elbow as well as continue to open and close her hand to limit stiffness.  -When she starts formal physical therapy, they will work on gentle progressions with regard to passive range of motion and active assisted range of motion.   I do not want any active range of motion starting until roughly 8 weeks post injury. Follow up 3 weeks with repeat 4 view right shoulder radiograph substituting a Velpeau view instead of axillary lateral.  At that time we will assess to see about weaning out of the sling over 6 to 8-week post injury point with the guidance of therapy\". She is using tylenol for pain control, but has not needed it for several weeks. R hand dominant. Pain       Patient describes pain to be intermittent in R shoulder in proximal humerus  Patient reports pain is  0/10 pain at present and  4/10 pain at its worst.  Worsened by: Any kind of movement of R arm    Improved by:  Wearing sling, pendulum   Pt. reports pain with coughing, sneezing and laughing:   []Yes   [x]No    []NA     Current Functional Limitations:   [x]Yes   []No  Functional Complaints:   She is limited with all activity using R arm   PLOF:   [x]No functional limitations   []Pt with previous functional limitations  Pt's sleep is affected?    [x]Yes, some sleep disturbances due to pain, sleeping with sling in bed   []No         Patient goal for therapy:  \"be able to use R arm\"      OBJECTIVE FINDINGS    Posture:   [x]Forward head  [x]Forward flexed trunk   []Pronounced CT junction    [x]Increased thoracic kyphosis   []Increased lumbar lordosis   []Scoliosis   []Swayback  []Decreased WB on   []R   []L   []Scapular winging  []Other:       Range of Motion   [x]Cervical Spine   []Thoracic Spine     Range Tested AROM PROM MMT/Resisted Tests   Flexion WFL  5/5   Extension \"  5/5   Sidebending Left \"  5/5   Sidebending Right \"  5/5   Rotation Left \"  5/5   Rotation Right \"  5/5   Comments:    UE Range of Motion/Strength Testing     [x]All ROM WNL except as marked below   [x]All strength WNL (5/5) except as marked below (measured out of 5)     Range Tested AROM PROM Resisted Comments   *denotes pain Left Right NT  Left Right Left Right  NT    Shoulder Flexion 160   88* 4/5     Shoulder Extension 55    5/5     Shoulder Abduction 140 conditions   []Asthma (J45)  []Coughing   []COPD (J44.9)   Psychological Disorders  [x]Anxiety (F41.9)  []Depression (F32.9)   [x]Other: paranoid schizophrenia [x]Other:   B TKR       Functional Outcome Measure:   []NA  Measure Used: Rupert Murray  Date Assessed: 8/25/21  Score:  70%    ASSESSMENT:  Patient presents with s/s consistent with Dx. Recommend PT treatment to address problem list so that the patient may return to regular activities without any functional limitations noted. Functional Impairments   []Noted spinal or UE joint hypomobility   []Noted spinal or UE joint hypermobility   [x]Decreased UE functional ROM   [x]Decreased UE functional strength   []Abnormal reflexes/sensation/myotomal/dermatomal deficits   [x]Decreased RC/scapular/core strength and neuromuscular control   []other:      Functional Activity Limitations (from functional questionnaire and intake)   []Reduced ability to tolerate prolonged functional positions   [x]Reduced ability or difficulty with changes of positions or transfers between positions   []Reduced ability to maintain good posture and demonstrate good body mechanics with sitting, bending, and lifting   [x] Reduced ability or tolerance with driving and/or computer work   [x]Reduced ability to sleep   [x]Reduced ability to perform lifting, reaching, carrying tasks   [x]Reduced ability to tolerate impact through UE   [x]Reduced ability to reach behind back   [x]Reduced ability to  or hold objects   [x]Reduced ability to throw or toss an object   []other:    Participation Restrictions   [x]Reduced participation in self care activities   [x]Reduced participation in home management activities   []Reduced participation in work activities   [x]Reduced participation in social activities. []Reduced participation in sport/recreation activities. Classification:    [x]Signs/symptoms consistent with post-fx status including decreased ROM, strength and function.   []Signs/symptoms work activities, and functional activities without increased symptoms or restriction. [] Progressing: [] Met: [] Not Met: [] Adjusted   5. Patient will have 0-2/10 pain with ADL's.  [] Progressing: [] Met: [] Not Met: [] Adjusted   6. Patient stated goal: sleep through the night without waking due to pain  [] Progressing: [] Met: [] Not Met: [] Adjusted    PLAN OF CARE     To see patient 1-2  x/week for 12 weeks for the following treatment interventions:  [x]Therapeutic Exercise  [x]Modalities of Choice: []Heat []Cold []US []Estim []Ionto []Vaso-pneumatic device  []Mechanical Traction   [x]Manual Therapy  [x]Neuromuscular Re-Education  []Gait Training   [x]Therapeutic Activity  []Other     Physical Therapy Evaluation Complexity Justification  [] EVAL (LOW) 83195 (typically 20 minutes face-to-face)  [x] EVAL (MOD) 12912 (typically 30 minutes face-to-face)  [] EVAL (HIGH) 68476 (typically 45 minutes face-to-face)  [] RE-EVAL     Thank you for the referral of this patient.       Timed Code Treatment Minutes:  30   minutes     Total Treatment Time:  60  minutes    Corey Hairston, PT  MPT, OMT-C [id number]

## 2021-08-27 ENCOUNTER — TELEPHONE (OUTPATIENT)
Dept: CARDIOLOGY CLINIC | Age: 76
End: 2021-08-27

## 2021-08-27 ENCOUNTER — HOSPITAL ENCOUNTER (OUTPATIENT)
Dept: PHYSICAL THERAPY | Age: 76
Setting detail: THERAPIES SERIES
Discharge: HOME | End: 2021-08-27
Payer: MEDICARE

## 2021-08-27 DIAGNOSIS — R55 SYNCOPE AND COLLAPSE: ICD-10-CM

## 2021-08-27 PROCEDURE — 97110 THERAPEUTIC EXERCISES: CPT

## 2021-08-27 PROCEDURE — 97140 MANUAL THERAPY 1/> REGIONS: CPT

## 2021-08-27 NOTE — FLOWSHEET NOTE
[address] [place] [address] [facility] MICHELLE Aceves [address]  Phone [phone]  Fax [phone]    Outpatient Physical Therapy     [x] Daily Treatment Note   [] Progress Note   [] Discharge Note    Date:  8/27/2021    Patient Name:  Brigido Simpson         YOB: 1945    Medical Diagnosis: Closed displaced fx of greater tuberosity of R humerus with routine healing (S42.251D)         Treatment Diagnosis: limited ROM, weakness, poor posture, swelling, decreased functional status                          Onset Date:    7/19/21  (DOI)               Referral Date:  8/9/21                 Referring Physician: Soraya Savage MD                                                    Visits Allowed/Insurance/Certification Information: [facility] Medicare, med Mic Pineda, no auth     Restrictions/Precautions: no AROM R shoulder until 8 weeks post-injury (9/13/21), PROM, AAROM only, can be out of sling 3x/day for ex or to rest in lap. Plan of care sent to provider:   []NA   []Faxed   [x]Co-signature       Plan of care signed:    []NA   [x]Yes 8/25/21   []No      Progress report will be due (10 Rx or 30 days whichever is less): 0/91/82     Recertification will be due (POC Duration  / 90 days whichever is less): 11/25/21     Visit# / total visits:     Visit # Insurance Allowable Auth Required   In Person 2/24 Med nec []  Yes     [x]  No    Tele Health 0  []  Yes     []  No    Total 2/24       Plan for Next Session:  Continue with PROM, add AAROM for HEP      Subjective:  Doing well. NO changes since eval.  Brought to clinic by daughter, 15 min late, as she was confused on her appt time. Performing exercises 3x/day. Pain level:  1-2/10 at rest, 4/10 at worst.   AT EVAL: Patient describes pain to be intermittent in R shoulder in proximal humerus  Patient reports pain is  0/10 pain at present and  4/10 pain at its worst.  Worsened by:   Any kind of movement of R arm        Objective:       Exercises:    Exercises in bold performed in department today. Items not bolded are carried forward from prior visits for continuity of the record0  Exercise/Equipment Resistance/Repetitions Issued for HEP     pulleys nv      AROM elbow flex/ext  Wrist flex/ext  Pronation/supination x10 each direction in sitting or supine x      strength  Yellow foam x   Supine back pocket shrugs 3x10 x     codmans x10 x     Table slides  x10      Axial elongation x10      Shoulder shrugs x10                                                                               Therapeutic Exercise/Home Exercise Program:   x10 min. Reviewed and progressed exercises as noted above with new handout given. Group Therapy:    0 minutes    Therapeutic Activity:  0 minutes     Gait: 0 minutes    Neuromuscular Re-Education:  0 minutes      Canalith Repositioning Procedure:  0 minutes    Manual Therapy:  15 minutes  PROM:  R shoulder in supine, flex, abd, ER, IR, elbow flex/ext, sup/pronation  Gentle inf oscillations for relaxation and pain relief    Modalities: 0 minutes- deferred, requests to ice at home. [x] GAME READY (VASO)- for significant edema, swelling, pain control, 38 deg, min pressure, with patient seated     Functional Outcome Measure:   []NA  Measure Used: Meghna Merida  Date Assessed: 8/25/21  Score:  70%    Assessment/Treatment/Activity Tolerance:    Patients response to treatment:   [x]Patient tolerated treatment well with no adverse reactions noted    []Patient limited by fatigue   []Patient limited by pain    []Patient limited by other medical complications   [x]Other: pain 0/10 after treatment    Goals:   Progress towards goals:  Goals established on 8/25/21  GOALS:  Short Term Goals:  2 weeks  1. Independent in HEP and progression per patient tolerance, in order to prevent re-injury. []? Progressing: []? Met: []? Not Met: []? Adjusted       2.  Patient will have a decrease in pain to facilitate improvement in movement, function, and ADLs as indicated by Functional Deficits. []? Progressing: []? Met: []? Not Met: []? Adjusted          Long Term Goals:  12 weeks  1. Disability index score of 20% or less for the QuickDash to assist with reaching prior level of function. []? Progressing: []? Met: []? Not Met: []? Adjusted      2. Patient will demonstrate increased AROM R shoulder = L shoulder to allow for proper joint functioning as indicated by patients Functional Deficits. []? Progressing: []? Met: []? Not Met: []? Adjusted       3. Patient will demonstrate an increase in strength to 4/5 or greater in R shoulder and UE to allow for proper functional mobility as indicated by patients Functional Deficits. []? Progressing: []? Met: []? Not Met: []? Adjusted       4. Patient will return to all transfers, work activities, and functional activities without increased symptoms or restriction. []? Progressing: []? Met: []? Not Met: []? Adjusted       5. Patient will have 0-2/10 pain with ADL's.  []? Progressing: []? Met: []? Not Met: []? Adjusted       6. Patient stated goal: sleep through the night without waking due to pain  []? Progressing: []? Met: []? Not Met: []?  Adjusted    Prognosis: [x]Good   []Fair   []Poor    Patient Requires Follow-up:  [x]Yes  []No    Plan: []Plan of care initiated     [x]Continue per plan of care    [] Alter current plan (see comments)    []Hold pending MD visit []Discharge    Charges:  Timed Code Treatment Minutes: 25   Total Treatment Minutes:  25   BWC time for each procedure?:  TE TIME:  NMR TIME:  MANUAL TIME:  UNTIMED MINUTES:       [] EVAL (LOW) 68934 (typically 20 minutes face-to-face)  [] EVAL (MOD) 50786 (typically 30 minutes face-to-face)  [] EVAL (HIGH) 02642 (typically 45 minutes face-to-face)  [] RE-EVAL     [x] KK(83135) x1     [] IONTO  [] NMR (53812) x     [] VASO  [x] Manual (37255) x1     [] Other:  [] TA x      [] [facility] T[facility] (31833)  [] ES(attended) (13583)     [] ES (un) (33041): Medicare Cap total YTD:   [x]N/A    Electronically signed by:  Antoinette Uribe, PT, MPT, OMT-C [id number]      Note: If patient does not return for scheduled/ recommended follow up visits, this note will serve as a discharge from care along with most recent update on progress.

## 2021-08-30 ENCOUNTER — OFFICE VISIT (OUTPATIENT)
Dept: ORTHOPEDIC SURGERY | Age: 76
End: 2021-08-30
Payer: MEDICARE

## 2021-08-30 VITALS — BODY MASS INDEX: 43.43 KG/M2 | HEIGHT: 61 IN | WEIGHT: 230 LBS

## 2021-08-30 DIAGNOSIS — S42.251D CLOSED DISPLACED FRACTURE OF GREATER TUBEROSITY OF RIGHT HUMERUS WITH ROUTINE HEALING: Primary | ICD-10-CM

## 2021-08-30 DIAGNOSIS — M25.511 ACUTE PAIN OF RIGHT SHOULDER: ICD-10-CM

## 2021-08-30 PROCEDURE — G8417 CALC BMI ABV UP PARAM F/U: HCPCS | Performed by: ORTHOPAEDIC SURGERY

## 2021-08-30 PROCEDURE — 1090F PRES/ABSN URINE INCON ASSESS: CPT | Performed by: ORTHOPAEDIC SURGERY

## 2021-08-30 PROCEDURE — 1036F TOBACCO NON-USER: CPT | Performed by: ORTHOPAEDIC SURGERY

## 2021-08-30 PROCEDURE — G8400 PT W/DXA NO RESULTS DOC: HCPCS | Performed by: ORTHOPAEDIC SURGERY

## 2021-08-30 PROCEDURE — 99213 OFFICE O/P EST LOW 20 MIN: CPT | Performed by: ORTHOPAEDIC SURGERY

## 2021-08-30 PROCEDURE — G8427 DOCREV CUR MEDS BY ELIG CLIN: HCPCS | Performed by: ORTHOPAEDIC SURGERY

## 2021-08-30 PROCEDURE — 1123F ACP DISCUSS/DSCN MKR DOCD: CPT | Performed by: ORTHOPAEDIC SURGERY

## 2021-08-30 PROCEDURE — 4040F PNEUMOC VAC/ADMIN/RCVD: CPT | Performed by: ORTHOPAEDIC SURGERY

## 2021-08-30 NOTE — PROGRESS NOTES
FOLLOW UP ORTHOPAEDIC NOTE    The patient follows up today for reevaluation of right shoulder injury. The patient states she has been nonweightbearing but has been going to therapy starting last week. She is accompanied by her . She states that she has been wearing the sling. Pain 2/10 on intake form    PE:  AAOx3  RR  Unlabored breathing  Skin warm and moist  Focused physical examination of the right shoulder  Unchanged clinical examination, positive tenderness palpation greater tuberosity. Skin intact throughout  5/5 D B T G IO EPL  SILT Ax, R, U, M  +2 radial pulse    Pertinent radiographs/imagin view right shoulder AP gray she scapular Y and Velpeau view 2021: Consolidating greater tuberosity fracture with healing callus and bridging present. Small anterior inferior glenoid fracture unchanged. Minimal displacement. Small ossific density in the axillary recess/pouch-unchanged. Diagnosis Orders   1. Closed displaced fracture of greater tuberosity of right humerus with routine healing     2. Acute pain of right shoulder  XR SHOULDER RIGHT (MIN 2 VIEWS)     Assessment and plan: 68 female with continued subjective symptoms of right shoulder pain with known, correlating diagnosis of right shoulder fracture dislocation currently with healing greater tuberosity fracture. -Time of 16 minutes was spent coordinating and discussing the clinical findings and diagnostic imaging results as they pertain to the patient's presenting subjective symptoms.  -I had a pleasant discussion with the patient and her  today. I am quite pleased with the overall healing process of where this fracture occurred especially given its comminution.  -Physical therapy will continue to progress her with full range of motion and increase ADLs. I do not want them to work on active range of motion ~8-week point. She may come out of her sling at home at this point and do waist level activity.   Passive and active assisted range of motion per physical therapy is also acceptable. Otherwise she may sleep in a sling for an additional 2 weeks ~8-week post injury point.  -No lifting greater than 2 to 5 pounds at this point. Again at waist level starting  -OTC Tylenol per bottle as needed discomfort  -Physical therapy will also work on pain modalities. I took extensive time today to review with them both again that this will take some time for her to continue to regain her range of motion and that there could always be some stiffness that persists there. Given the severity of the injury, and the amount of comminution with reduction of these fragments into the greater tuberosity/Hill-Sachs lesion area, this currently is encouraging and will trend towards a positive result  -All questions answered to the patient's satisfaction and the patient expressed understanding and agreement with the above listed treatment plan  -Follow up 6 weeks for 3-month post injury visit  -Thank you for the clinical consultation and allowing me to participate in the patient's care. Electronically signed by Brady Strong MD on 8/30/21 at 10:03 AM MIA Strong MD       Orthopaedic Surgery-Sports Medicine    Disclaimer: This note was dictated with voice recognition software. Though review and correction are routinely performed, please contact the office/medical records for any errors requiring correction.

## 2021-08-31 ENCOUNTER — HOSPITAL ENCOUNTER (OUTPATIENT)
Dept: PHYSICAL THERAPY | Age: 76
Setting detail: THERAPIES SERIES
Discharge: HOME | End: 2021-08-31
Payer: MEDICARE

## 2021-08-31 PROCEDURE — 97140 MANUAL THERAPY 1/> REGIONS: CPT

## 2021-08-31 PROCEDURE — 97016 VASOPNEUMATIC DEVICE THERAPY: CPT

## 2021-08-31 PROCEDURE — 97110 THERAPEUTIC EXERCISES: CPT

## 2021-08-31 NOTE — FLOWSHEET NOTE
[address] [place] [address] [facility] MICHELLE Aceves [address]  Phone [phone]  Fax [phone]    Outpatient Physical Therapy     [x] Daily Treatment Note   [] Progress Note   [] Discharge Note    Date:  2021    Patient Name:  Lavonne Sidhu         YOB: 1945    Medical Diagnosis: Closed displaced fx of greater tuberosity of R humerus with routine healing (S42.251D)         Treatment Diagnosis: limited ROM, weakness, poor posture, swelling, decreased functional status                          Onset Date:    21  (DOI)               Referral Date:  21                 Referring Physician: Claude Comber, MD                                                    Visits Allowed/Insurance/Certification Information: [facility] Medicare, med Brina Farooq, no auth     Restrictions/Precautions: no AROM R shoulder until 8 weeks post-injury (21), PROM, AAROM only, no strengthening    Plan of care sent to provider:   []NA   []Faxed   [x]Co-signature       Plan of care signed:    []NA   [x]Yes 21   []No      Progress report will be due (10 Rx or 30 days whichever is less): 9/10/18     Recertification will be due (POC Duration  / 90 days whichever is less): 21     Visit# / total visits:     Visit # Insurance Allowable Auth Required   In Person 3/24 Med nec []  Yes     [x]  No    Tele Health 0  []  Yes     []  No    Total 3/24       Plan for Next Session:  Continue with PROM, add AAROM for HEP      Subjective:  Patient doing well. Saw MD on 21. Had xrays as follows:  Pertinent radiographs/imagin view right shoulder AP gray she scapular Y and Velpeau view 2021: Consolidating greater tuberosity fracture with healing callus and bridging present. Small anterior inferior glenoid fracture unchanged. Minimal displacement. Small ossific density in the axillary recess/pouch-unchanged.     Per MD note on 21, \"Physical therapy will continue to progress her with full range of motion and increase ADLs. I do not want them to work on active range of motion ~8-week point. She may come out of her sling at home at this point and do waist level activity. Passive and active assisted range of motion per physical therapy is also acceptable. Otherwise she may sleep in a sling for an additional 2 weeks ~8-week post injury point.  -No lifting greater than 2 to 5 pounds at this point. Again at waist level starting  -OTC Tylenol per bottle as needed discomfort  -Physical therapy will also work on pain modalities\"    Patient is not wearing her sling during the day, but is wearing it at night. Sees MD again 10/18/21. Pain level:  1/10 at rest, 3/10 at worst.   AT EVAL: Patient describes pain to be intermittent in R shoulder in proximal humerus  Patient reports pain is  0/10 pain at present and  4/10 pain at its worst.  Worsened by: Any kind of movement of R arm        Objective:     PROM:  R shoulder flex 110, abd 70, ER 35 in 70 deg abd, IR 80 in 70 deg abd       Exercises:    Exercises in bold performed in department today. Items not bolded are carried forward from prior visits for continuity of the record0  Exercise/Equipment Resistance/Repetitions Issued for HEP     pulleys x5 min PT present for subjective info     AROM elbow flex/ext  Wrist flex/ext  Pronation/supination x10 each direction in sitting or supine x      strength  Yellow foam x   Supine back pocket shrugs and scap retraction 2x10 of each x     codmans x10 x     Table slides  x10      Axial elongation x10      Shoulder shrugs x10      Scapular clock (seated) x10 each (12/6, 3/9)                                                                        Therapeutic Exercise/Home Exercise Program:   x15 min. Reviewed and progressed exercises as noted above with new handout given.      Group Therapy:    0 minutes    Therapeutic Activity:  0 minutes     Gait: 0 minutes    Neuromuscular Re-Education:  0 minutes      Canalith Repositioning Procedure:  0 minutes    Manual Therapy:  25 minutes  PROM:  R shoulder in supine, flex, abd, ER, IR, elbow flex/ext, sup/pronation  Gentle inf oscillations for relaxation and pain relief    Modalities: 15 minutes    [x] GAME READY (VASO)- for significant edema, swelling, pain control, 38 deg, min pressure, with patient seated     Functional Outcome Measure:   []NA  Measure Used: Marta Chen  Date Assessed: 8/25/21  Score:  70%    Assessment/Treatment/Activity Tolerance:    Patients response to treatment:   [x]Patient tolerated treatment well with no adverse reactions noted    []Patient limited by fatigue   []Patient limited by pain    []Patient limited by other medical complications   [x]Other: pain 2-3/10 after treatment    Goals:   Progress towards goals:  Goals established on 8/25/21  GOALS:  Short Term Goals:  2 weeks  1. Independent in HEP and progression per patient tolerance, in order to prevent re-injury. []? Progressing: []? Met: []? Not Met: []? Adjusted       2. Patient will have a decrease in pain to facilitate improvement in movement, function, and ADLs as indicated by Functional Deficits. []? Progressing: []? Met: []? Not Met: []? Adjusted          Long Term Goals:  12 weeks  1. Disability index score of 20% or less for the QuickDash to assist with reaching prior level of function. []? Progressing: []? Met: []? Not Met: []? Adjusted      2. Patient will demonstrate increased AROM R shoulder = L shoulder to allow for proper joint functioning as indicated by patients Functional Deficits. []? Progressing: []? Met: []? Not Met: []? Adjusted       3. Patient will demonstrate an increase in strength to 4/5 or greater in R shoulder and UE to allow for proper functional mobility as indicated by patients Functional Deficits. []? Progressing: []? Met: []? Not Met: []? Adjusted       4.  Patient will return to all transfers,

## 2021-09-02 ENCOUNTER — HOSPITAL ENCOUNTER (OUTPATIENT)
Dept: PHYSICAL THERAPY | Age: 76
Setting detail: THERAPIES SERIES
Discharge: HOME | End: 2021-09-02
Payer: MEDICARE

## 2021-09-02 ENCOUNTER — APPOINTMENT (OUTPATIENT)
Dept: PHYSICAL THERAPY | Age: 76
End: 2021-09-02
Payer: MEDICARE

## 2021-09-02 PROCEDURE — 97140 MANUAL THERAPY 1/> REGIONS: CPT

## 2021-09-02 PROCEDURE — 97016 VASOPNEUMATIC DEVICE THERAPY: CPT

## 2021-09-02 PROCEDURE — 97110 THERAPEUTIC EXERCISES: CPT

## 2021-09-02 NOTE — FLOWSHEET NOTE
[address] [place] [address] [facility] MICHELLE Aceves [address]  Phone [phone]  Fax [phone]    Outpatient Physical Therapy     [x] Daily Treatment Note   [] Progress Note   [] Discharge Note    Date:  2021    Patient Name:  Buck Perdomo         YOB: 1945    Medical Diagnosis: Closed displaced fx of greater tuberosity of R humerus with routine healing (S42.251D)         Treatment Diagnosis: limited ROM, weakness, poor posture, swelling, decreased functional status                          Onset Date:    21  (DOI)               Referral Date:  21                 Referring Physician: Anna Blankenship MD                                                    Visits Allowed/Insurance/Certification Information: [facility] Medicare, jessi Bettencourt, no auth     Restrictions/Precautions: no AROM R shoulder until 8 weeks post-injury (21), PROM, AAROM only, no strengthening    Plan of care sent to provider:   []NA   []Faxed   [x]Co-signature       Plan of care signed:    []NA   [x]Yes 21   []No      Progress report will be due (10 Rx or 30 days whichever is less):      Recertification will be due (POC Duration  / 90 days whichever is less): 21     Visit# / total visits:     Visit # Insurance Allowable Auth Required   In Person  Med nec []  Yes     [x]  No    [facility] 0  []  Yes     []  No    Total        Plan for Next Session:  Continue with PROM, add AAROM for HEP      Subjective:  Reports she took tylenol a bit ago  21 Had xrays as follows:  Pertinent radiographs/imagin view right shoulder AP gray she scapular Y and Velpeau view 2021: Consolidating greater tuberosity fracture with healing callus and bridging present. Small anterior inferior glenoid fracture unchanged. Minimal displacement. Small ossific density in the axillary recess/pouch-unchanged.     Per MD note on 21, \"Physical therapy will continue to progress her with full range of motion and increase ADLs. I do not want them to work on active range of motion ~8-week point. She may come out of her sling at home at this point and do waist level activity. Passive and active assisted range of motion per physical therapy is also acceptable. Otherwise she may sleep in a sling for an additional 2 weeks ~8-week post injury point.  -No lifting greater than 2 to 5 pounds at this point. Again at waist level starting  -OTC Tylenol per bottle as needed discomfort  -Physical therapy will also work on pain modalities\"    Patient is not wearing her sling during the day, but is wearing it at night. Sees MD again 10/18/21. Pain level:  1/10 at rest, 3/10 at worst.   AT EVAL: Patient describes pain to be intermittent in R shoulder in proximal humerus  Patient reports pain is  0/10 pain at present and  4/10 pain at its worst.  Worsened by: Any kind of movement of R arm        Objective:     PROM:  R shoulder flex 120, abd 85, ER 35 in 70 deg abd, IR 85 in 70 deg abd       Exercises:    Exercises in bold performed in department today. Items not bolded are carried forward from prior visits for continuity of the record0  Exercise/Equipment Resistance/Repetitions Issued for HEP     pulleys x5 min PT present for subjective info     AROM elbow flex/ext  Wrist flex/ext  Pronation/supination x10 each direction in sitting or supine x      strength  Yellow foam x   Supine back pocket shrugs and scap retraction 2x10 of each x     codmans x10 x     Table slides  x10      Axial elongation x10      Shoulder shrugs x10      Scapular clock (seated) x10 each (12/6, 3/9)                                                                        Therapeutic Exercise/Home Exercise Program:   x14 min.   Reviewed and progressed exercises as noted above     Group Therapy:    0 minutes    Therapeutic Activity:  0 minutes     Gait: 0 minutes    Neuromuscular Re-Education:  0 minutes      Canalith Repositioning Procedure:  0 minutes    Manual Therapy:  25 minutes  PROM:  R shoulder in supine, flex, abd, ER, IR, elbow flex/ext, sup/pronation  Gentle inf oscillations for relaxation and pain relief    Modalities: 15 minutes    [x] GAME READY (VASO)- for significant edema, swelling, pain control, 38 deg, min pressure, with patient seated     Functional Outcome Measure:   []NA  Measure Used: Remington Turner  Date Assessed: 8/25/21  Score:  70%    Assessment/Treatment/Activity Tolerance:    Patients response to treatment:   [x]Patient tolerated treatment well with no adverse reactions noted    []Patient limited by fatigue   []Patient limited by pain    []Patient limited by other medical complications   [x]Other: pain 2-3/10 after treatment    Goals:   Progress towards goals:  Goals established on 8/25/21  GOALS:  Short Term Goals:  2 weeks  1. Independent in HEP and progression per patient tolerance, in order to prevent re-injury. []? Progressing: []? Met: []? Not Met: []? Adjusted       2. Patient will have a decrease in pain to facilitate improvement in movement, function, and ADLs as indicated by Functional Deficits. []? Progressing: []? Met: []? Not Met: []? Adjusted          Long Term Goals:  12 weeks  1. Disability index score of 20% or less for the QuickDash to assist with reaching prior level of function. []? Progressing: []? Met: []? Not Met: []? Adjusted      2. Patient will demonstrate increased AROM R shoulder = L shoulder to allow for proper joint functioning as indicated by patients Functional Deficits. []? Progressing: []? Met: []? Not Met: []? Adjusted       3. Patient will demonstrate an increase in strength to 4/5 or greater in R shoulder and UE to allow for proper functional mobility as indicated by patients Functional Deficits. []? Progressing: []? Met: []? Not Met: []? Adjusted       4.  Patient will return to all transfers, work activities, and functional activities without increased symptoms or restriction. []? Progressing: []? Met: []? Not Met: []? Adjusted       5. Patient will have 0-2/10 pain with ADL's.  []? Progressing: []? Met: []? Not Met: []? Adjusted       6. Patient stated goal: sleep through the night without waking due to pain  []? Progressing: []? Met: []? Not Met: []? Adjusted    Prognosis: [x]Good   []Fair   []Poor    Patient Requires Follow-up:  [x]Yes  []No    Plan: []Plan of care initiated     [x]Continue per plan of care    [] Alter current plan (see comments)    []Hold pending MD visit []Discharge    Charges:  Timed Code Treatment Minutes: 39   Total Treatment Minutes:  54   BWC time for each procedure?:  TE TIME:  NMR TIME:  MANUAL TIME:  UNTIMED MINUTES:       [] EVAL (LOW) 30757 (typically 20 minutes face-to-face)  [] EVAL (MOD) 79583 (typically 30 minutes face-to-face)  [] EVAL (HIGH) 12252 (typically 45 minutes face-to-face)  [] RE-EVAL        [x] FP(13797) x1     [] IONTO  [] NMR (38970) x     [x]? VASO  [x]? Manual (83224) x2    [] Other:  [] TA x      [] Mech Traction (97278)  [] ES(attended) (54003)     [] ES (un) (99842): Medicare Cap total YTD:   [x]N/A    Electronically signed by:  SANDOVAL [id number]      Note: If patient does not return for scheduled/ recommended follow up visits, this note will serve as a discharge from care along with most recent update on progress.

## 2021-09-07 ENCOUNTER — HOSPITAL ENCOUNTER (OUTPATIENT)
Dept: PHYSICAL THERAPY | Age: 76
Setting detail: THERAPIES SERIES
Discharge: HOME | End: 2021-09-07
Payer: MEDICARE

## 2021-09-07 PROCEDURE — 97140 MANUAL THERAPY 1/> REGIONS: CPT

## 2021-09-07 PROCEDURE — 97016 VASOPNEUMATIC DEVICE THERAPY: CPT

## 2021-09-07 PROCEDURE — 97110 THERAPEUTIC EXERCISES: CPT

## 2021-09-07 NOTE — FLOWSHEET NOTE
[address] [place] [address] [facility] MICHELLE Aceves [address]  Phone [phone]  Fax [phone]    Outpatient Physical Therapy     [x] Daily Treatment Note   [] Progress Note   [] Discharge Note    Date:  2021    Patient Name:  Agustina Huitron   \"Pat\"       YOB: 1945    Medical Diagnosis: Closed displaced fx of greater tuberosity of R humerus with routine healing (S42.251D)         Treatment Diagnosis: limited ROM, weakness, poor posture, swelling, decreased functional status                          Onset Date:    21  (DOI)               Referral Date:  21                 Referring Physician: Valeda Dubin, MD                                                    Visits Allowed/Insurance/Certification Information: [facility] Medicare, med Qian Gilmore, no auth     Restrictions/Precautions: no AROM R shoulder until 8 weeks post-injury (21), PROM, AAROM only, no strengthening    Plan of care sent to provider:   []NA   []Faxed   [x]Co-signature       Plan of care signed:    []NA   [x]Yes 21   []No      Progress report will be due (10 Rx or 30 days whichever is less):      Recertification will be due (POC Duration  / 90 days whichever is less): 21     Visit# / total visits:     Visit # Insurance Allowable Auth Required   In Person  Med nec []  Yes     [x]  No    Tele Health 0  []  Yes     []  No    Total        Plan for Next Session:  Continue with PROM, add AAROM for HEP      Subjective:  21  Patient is doing well. Only wearing her sling at night. Took tylenol this morning to help with the pain. Performing HEP 3x/day. Still having discomfort at night, but has not been taking anything for pain at night.     21 Had xrays as follows:  Pertinent radiographs/imagin view right shoulder AP gray she scapular Y and Velpeau view 2021: Consolidating greater tuberosity fracture with healing callus and bridging present. Small anterior inferior glenoid fracture unchanged. Minimal displacement. Small ossific density in the axillary recess/pouch-unchanged. Per MD note on 8/30/21, \"Physical therapy will continue to progress her with full range of motion and increase ADLs. I do not want them to work on active range of motion ~8-week point. She may come out of her sling at home at this point and do waist level activity. Passive and active assisted range of motion per physical therapy is also acceptable. Otherwise she may sleep in a sling for an additional 2 weeks ~8-week post injury point.  -No lifting greater than 2 to 5 pounds at this point. Again at waist level starting  -OTC Tylenol per bottle as needed discomfort  -Physical therapy will also work on pain modalities\"    Patient is not wearing her sling during the day, but is wearing it at night. Sees MD again 10/18/21. Pain level:  0/10 at rest, 3-4/10 at worst since last visit. AT EVAL: Patient describes pain to be intermittent in R shoulder in proximal humerus  Patient reports pain is  0/10 pain at present and  4/10 pain at its worst.  Worsened by: Any kind of movement of R arm        Objective:     PROM:  R shoulder flex 130, abd 95, ER 60 in 90 deg abd, IR 65 in 90 deg abd       Exercises:    Exercises in bold performed in department today.   Items not bolded are carried forward from prior visits for continuity of the record0  Exercise/Equipment Resistance/Repetitions Issued for HEP     pulleys x5 min PT present for subjective info     AROM elbow flex/ext  Wrist flex/ext  Pronation/supination x10 each direction in sitting or supine x      strength  Yellow foam x   Supine back pocket shrugs and scap retraction 2x10 of each x     codmans x10 x     Table slides  x10      Axial elongation x10      Shoulder shrugs 2x10      Scapular clock (seated) x10 each (12/6, 3/9) Therapeutic Exercise/Home Exercise Program:   x15 min. Reviewed and progressed exercises as noted above     Group Therapy:    0 minutes    Therapeutic Activity:  0 minutes     Gait: 0 minutes    Neuromuscular Re-Education:  0 minutes      Canalith Repositioning Procedure:  0 minutes    Manual Therapy:  25 minutes  PROM:  R shoulder in supine, flex, abd, ER, IR, elbow flex/ext, sup/pronation  Gentle inf oscillations for relaxation and pain relief    Modalities: 15 minutes    [x] GAME READY (VASO)- for significant edema, swelling, pain control, 38 deg, min pressure, with patient seated     Functional Outcome Measure:   []NA  Measure Used: Ginette Mariee  Date Assessed: 8/25/21  Score:  70%    Assessment/Treatment/Activity Tolerance:    Patients response to treatment:   [x]Patient tolerated treatment well with no adverse reactions noted    []Patient limited by fatigue   []Patient limited by pain    []Patient limited by other medical complications   [x]Other: pain 0/10 after treatment    Goals:   Progress towards goals:  Goals established on 8/25/21  GOALS:  Short Term Goals:  2 weeks  1. Independent in HEP and progression per patient tolerance, in order to prevent re-injury. []? Progressing: []? Met: []? Not Met: []? Adjusted       2. Patient will have a decrease in pain to facilitate improvement in movement, function, and ADLs as indicated by Functional Deficits. []? Progressing: []? Met: []? Not Met: []? Adjusted          Long Term Goals:  12 weeks  1. Disability index score of 20% or less for the QuickDash to assist with reaching prior level of function. []? Progressing: []? Met: []? Not Met: []? Adjusted      2. Patient will demonstrate increased AROM R shoulder = L shoulder to allow for proper joint functioning as indicated by patients Functional Deficits. []? Progressing: []? Met: []? Not Met: []? Adjusted       3.  Patient will demonstrate an increase in strength to 4/5 or greater in R shoulder and UE to allow for proper functional mobility as indicated by patients Functional Deficits. []? Progressing: []? Met: []? Not Met: []? Adjusted       4. Patient will return to all transfers, work activities, and functional activities without increased symptoms or restriction. []? Progressing: []? Met: []? Not Met: []? Adjusted       5. Patient will have 0-2/10 pain with ADL's.  []? Progressing: []? Met: []? Not Met: []? Adjusted       6. Patient stated goal: sleep through the night without waking due to pain  []? Progressing: []? Met: []? Not Met: []? Adjusted    Prognosis: [x]Good   []Fair   []Poor    Patient Requires Follow-up:  [x]Yes  []No    Plan: []Plan of care initiated     [x]Continue per plan of care    [] Alter current plan (see comments)    []Hold pending MD visit []Discharge    Charges:  Timed Code Treatment Minutes: 40   Total Treatment Minutes:  55   [facility] time for each procedure?:  TE TIME:  NMR TIME:  MANUAL TIME:  UNTIMED MINUTES:       [] EVAL (LOW) 12221 (typically 20 minutes face-to-face)  [] EVAL (MOD) 02111 (typically 30 minutes face-to-face)  [] EVAL (HIGH) 49346 (typically 45 minutes face-to-face)  [] RE-EVAL        [x] AV(35849) x1     [] IONTO  [] NMR (88367) x     [x]? VASO  [x]? Manual (37785) x2    [] Other:  [] TA x      [] Mech Traction (84215)  [] ES(attended) (84673)     [] ES (un) (38772): Medicare Cap total YTD:   [x]N/A    Electronically signed by:  Corey Hairston, PT, MPT, OMT-C [id number]        Note: If patient does not return for scheduled/ recommended follow up visits, this note will serve as a discharge from care along with most recent update on progress.

## 2021-09-08 RX ORDER — HYDROCHLOROTHIAZIDE 25 MG/1
TABLET ORAL
Qty: 90 TABLET | Refills: 0 | Status: SHIPPED | OUTPATIENT
Start: 2021-09-08 | End: 2021-11-05

## 2021-09-09 ENCOUNTER — HOSPITAL ENCOUNTER (OUTPATIENT)
Dept: PHYSICAL THERAPY | Age: 76
Setting detail: THERAPIES SERIES
Discharge: HOME | End: 2021-09-09
Payer: MEDICARE

## 2021-09-09 PROCEDURE — 97110 THERAPEUTIC EXERCISES: CPT

## 2021-09-09 PROCEDURE — 97016 VASOPNEUMATIC DEVICE THERAPY: CPT

## 2021-09-09 PROCEDURE — 97140 MANUAL THERAPY 1/> REGIONS: CPT

## 2021-09-09 NOTE — FLOWSHEET NOTE
[address] [place] [address] [facility] MICHELLE Aceves [address]  Phone [phone]  Fax [phone]    Outpatient Physical Therapy     [x] Daily Treatment Note   [] Progress Note   [] Discharge Note    Date:  2021    Patient Name:  Leslie Boothe   \"Pat\"       YOB: 1945    Medical Diagnosis: Closed displaced fx of greater tuberosity of R humerus with routine healing (S42.251D)         Treatment Diagnosis: limited ROM, weakness, poor posture, swelling, decreased functional status                          Onset Date:    21  (DOI)               Referral Date:  21                 Referring Physician: Mariaelena Brantley MD                                                    Visits Allowed/Insurance/Certification Information: [facility] Medicare, jessi Gonzalez, no auth     Restrictions/Precautions: no AROM R shoulder until 8 weeks post-injury (21), PROM, AAROM only, no strengthening    Plan of care sent to provider:   []NA   []Faxed   [x]Co-signature       Plan of care signed:    []NA   [x]Yes 21   []No      Progress report will be due (10 Rx or 30 days whichever is less): 21    Sees MD again     Recertification will be due (POC Duration  / 90 days whichever is less): 21     Visit# / total visits:     Visit # Insurance Allowable Auth Required   In Person  Med nec []  Yes     [x]  No    Tele Health 0  []  Yes     []  No    Total        Plan for Next Session:  Continue with PROM, add AAROM for HEP      Subjective   Took xtra strength tylenol about 20 min ago to prepare for therapy and takes a couple of the regular strength at night   Performing HEP 3x/day. 21 Had xrays as follows:  Pertinent radiographs/imagin view right shoulder AP gray she scapular Y and Velpeau view 2021: Consolidating greater tuberosity fracture with healing callus and bridging present.   Small anterior inferior glenoid fracture unchanged. Minimal displacement. Small ossific density in the axillary recess/pouch-unchanged. Per MD note on 8/30/21, \"Physical therapy will continue to progress her with full range of motion and increase ADLs. I do not want them to work on active range of motion ~8-week point. She may come out of her sling at home at this point and do waist level activity. Passive and active assisted range of motion per physical therapy is also acceptable. Otherwise she may sleep in a sling for an additional 2 weeks ~8-week post injury point.  -No lifting greater than 2 to 5 pounds at this point. Again at waist level starting  -OTC Tylenol per bottle as needed discomfort  -Physical therapy will also work on pain modalities\"    Patient is not wearing her sling during the day, but is wearing it at night. Sees MD again 10/18/21. Pain level:  Currently 3/10     0/10 at rest, 3-4/10 at worst since last visit. AT EVAL: Patient describes pain to be intermittent in R shoulder in proximal humerus  Patient reports pain is  0/10 pain at present and  4/10 pain at its worst.  Worsened by: Any kind of movement of R arm        Objective:     PROM:  R shoulder flex 130, abd 95, ER 50 in 90 deg abd, IR 65 in 90 deg abd       Exercises:   fx 7/19/21  End of 7 wk 7/6/21  Exercises in bold performed in department today.   Items not bolded are carried forward from prior visits for continuity of the record  Exercise/Equipment Resistance/Repetitions Issued for HEP     pulleys x5 min PT present for subjective info     AROM elbow flex/ext  Wrist flex/ext  Pronation/supination x10 each direction in sitting or supine x      strength  Yellow foam x   Supine back pocket shrugs and scap retraction 2x10 of each x     codmans x10 x     Table slides  x10 x     Axial elongation x10 x     Shoulder shrugs 2x10 x     Scapular clock (seated) x10 each (12/6, 3/9) x Therapeutic Exercise/Home Exercise Program:   x15 min. Reviewed and progressed exercises as noted above     Group Therapy:    0 minutes    Therapeutic Activity:  0 minutes     Gait: 0 minutes    Neuromuscular Re-Education:  0 minutes      Canalith Repositioning Procedure:  0 minutes    Manual Therapy:  27 minutes  PROM:  R shoulder in supine, flex, abd, ER, IR, elbow flex/ext, sup/pronation  Gentle inf oscillations for relaxation and pain relief    Modalities: 15 minutes    [x] GAME READY (VASO)- for significant edema, swelling, pain control, 38 deg, min pressure, with patient seated     Functional Outcome Measure:   []NA  Measure Used: Theodore Tyler  Date Assessed: 8/25/21  Score:  70%    Assessment/Treatment/Activity Tolerance:    Patients response to treatment:   [x]Patient tolerated treatment well with no adverse reactions noted    []Patient limited by fatigue   []Patient limited by pain    []Patient limited by other medical complications   [x]Other: pain 0/10 after treatment    Goals:   Progress towards goals:  Goals established on 8/25/21  GOALS:  Short Term Goals:  2 weeks  1. Independent in HEP and progression per patient tolerance, in order to prevent re-injury. []? Progressing: []? Met: []? Not Met: []? Adjusted       2. Patient will have a decrease in pain to facilitate improvement in movement, function, and ADLs as indicated by Functional Deficits. []? Progressing: []? Met: []? Not Met: []? Adjusted          Long Term Goals:  12 weeks  1. Disability index score of 20% or less for the QuickDash to assist with reaching prior level of function. []? Progressing: []? Met: []? Not Met: []? Adjusted      2. Patient will demonstrate increased AROM R shoulder = L shoulder to allow for proper joint functioning as indicated by patients Functional Deficits. []? Progressing: []? Met: []? Not Met: []? Adjusted       3.  Patient will demonstrate an increase in strength to 4/5 or greater in R shoulder and UE to allow for proper functional mobility as indicated by patients Functional Deficits. []? Progressing: []? Met: []? Not Met: []? Adjusted       4. Patient will return to all transfers, work activities, and functional activities without increased symptoms or restriction. []? Progressing: []? Met: []? Not Met: []? Adjusted       5. Patient will have 0-2/10 pain with ADL's.  []? Progressing: []? Met: []? Not Met: []? Adjusted       6. Patient stated goal: sleep through the night without waking due to pain  []? Progressing: []? Met: []? Not Met: []? Adjusted    Prognosis: [x]Good   []Fair   []Poor    Patient Requires Follow-up:  [x]Yes  []No    Plan: []Plan of care initiated     [x]Continue per plan of care    [] Alter current plan (see comments)    []Hold pending MD visit []Discharge    Charges:  Timed Code Treatment Minutes: 42   Total Treatment Minutes:  57   [facility] time for each procedure?:  TE TIME:  NMR TIME:  MANUAL TIME:  UNTIMED MINUTES:       [] EVAL (LOW) 21787 (typically 20 minutes face-to-face)  [] EVAL (MOD) 93365 (typically 30 minutes face-to-face)  [] EVAL (HIGH) 89693 (typically 45 minutes face-to-face)  [] RE-EVAL        [x] RV(64974) x1     [] IONTO  [] NMR (45565) x     [x]? VASO  [x]? Manual (13776) x2    [] Other:  [] TA x      [] Mech Traction (53390)  [] ES(attended) (56241)     [] ES (un) (48482): Medicare Cap total YTD:   [x]N/A    Electronically signed by:  ALYSSIA [id number]        Note: If patient does not return for scheduled/ recommended follow up visits, this note will serve as a discharge from care along with most recent update on progress.

## 2021-09-13 ENCOUNTER — HOSPITAL ENCOUNTER (OUTPATIENT)
Dept: PHYSICAL THERAPY | Age: 76
Setting detail: THERAPIES SERIES
Discharge: HOME | End: 2021-09-13
Payer: MEDICARE

## 2021-09-13 PROCEDURE — 97016 VASOPNEUMATIC DEVICE THERAPY: CPT

## 2021-09-13 PROCEDURE — 97140 MANUAL THERAPY 1/> REGIONS: CPT

## 2021-09-13 PROCEDURE — 97110 THERAPEUTIC EXERCISES: CPT

## 2021-09-13 NOTE — FLOWSHEET NOTE
[address] [place] [address] [facility] MICHELLE Aceves [address]  Phone [phone]  Fax [phone]    Outpatient Physical Therapy     [x] Daily Treatment Note   [] Progress Note   [] Discharge Note    Date:  2021    Patient Name:  Ruchi Garces   \"Mariam\"       YOB: 1945    Medical Diagnosis: Closed displaced fx of greater tuberosity of R humerus with routine healing (S42.251D)         Treatment Diagnosis: limited ROM, weakness, poor posture, swelling, decreased functional status                          Onset Date:    21  (DOI)               Referral Date:  21                 Referring Physician: Carrie Zambrano MD                                                    Visits Allowed/Insurance/Certification Information: [facility] Medicare, med Lindjo Fly, no auth     Restrictions/Precautions: no AROM R shoulder until 8 weeks post-injury (21), PROM, AAROM only, no strengthening    Plan of care sent to provider:   []NA   []Faxed   [x]Co-signature       Plan of care signed:    []NA   [x]Yes 21   []No      Progress report will be due (10 Rx or 30 days whichever is less): 21    Sees MD again     Recertification will be due (POC Duration  / 90 days whichever is less): 21     Visit# / total visits:     Visit # Insurance Allowable Auth Required   In Person  Med nec []  Yes     [x]  No    Tele Health 0  []  Yes     []  No    Total        Plan for Next Session:  Continue with PROM, add AAROM for HEP      Subjective   Took xtra strength tylenol again before coming to therapy. Reports having a bad day yesterday and really bad overnight, waking twice to take Asprin. Performing HEP 3x/day. 21 Had xrays as follows:  Pertinent radiographs/imagin view right shoulder AP gray she scapular Y and Velpeau view 2021: Consolidating greater tuberosity fracture with healing callus and bridging present. Small anterior inferior glenoid fracture unchanged. Minimal displacement. Small ossific density in the axillary recess/pouch-unchanged. Per MD note on 8/30/21, \"Physical therapy will continue to progress her with full range of motion and increase ADLs. I do not want them to work on active range of motion ~8-week point. She may come out of her sling at home at this point and do waist level activity. Passive and active assisted range of motion per physical therapy is also acceptable. Otherwise she may sleep in a sling for an additional 2 weeks ~8-week post injury point.  -No lifting greater than 2 to 5 pounds at this point. Again at waist level starting  -OTC Tylenol per bottle as needed discomfort  -Physical therapy will also work on pain modalities\"    Patient is not wearing her sling during the day, but is wearing it at night. Sees MD again 10/18/21. Pain level:  Currently 3/10     At times 0/10 at rest, 5-6/10 at worst since last visit. AT EVAL: Patient describes pain to be intermittent in R shoulder in proximal humerus  Patient reports pain is  0/10 pain at present and  4/10 pain at its worst.  Worsened by: Any kind of movement of R arm        Objective:     PROM:  R shoulder flex 130, abd 95, ER 50 in 90 deg abd, IR 70 in 90 deg abd discomfort with all motions       Exercises:   fx 7/19/21  End of 8 wk 9/13/21  Exercises in bold performed in department today.   Items not bolded are carried forward from prior visits for continuity of the record  Exercise/Equipment Resistance/Repetitions Issued for HEP     pulleys x5 min PT present for subjective info     AROM elbow flex/ext  Wrist flex/ext  Pronation/supination x10 each direction in sitting or supine x      strength  Yellow foam x   Supine back pocket shrugs and scap retraction 2x10 of each x     codmans x10 x     Table slides  x10 x     Axial elongation x10 x     Shoulder shrugs 2x10 x     Scapular clock (seated) x10 each (12/6, 3/9) x Therapeutic Exercise/Home Exercise Program:   x16 min. Reviewed and progressed exercises as noted above     Group Therapy:    0 minutes    Therapeutic Activity:  0 minutes     Gait: 0 minutes    Neuromuscular Re-Education:  0 minutes      Canalith Repositioning Procedure:  0 minutes    Manual Therapy:  27 minutes  PROM:  R shoulder in supine, flex, abd, ER, IR, elbow flex/ext, sup/pronation  Gentle inf oscillations for relaxation and pain relief    Modalities: 15 minutes    [x] GAME READY (VASO)- for significant edema, swelling, pain control, 38 deg, min pressure, with patient seated     Functional Outcome Measure:   []NA  Measure Used: Ginette Mariee  Date Assessed: 8/25/21  Score:  70%    Assessment/Treatment/Activity Tolerance:    Patients response to treatment:   [x]Patient tolerated treatment well with no adverse reactions noted    []Patient limited by fatigue   []Patient limited by pain    []Patient limited by other medical complications   [x]Other: pain 0/10 after treatment    Goals:   Progress towards goals:  Goals established on 8/25/21  GOALS:  Short Term Goals:  2 weeks  1. Independent in HEP and progression per patient tolerance, in order to prevent re-injury. []? Progressing: []? Met: []? Not Met: []? Adjusted       2. Patient will have a decrease in pain to facilitate improvement in movement, function, and ADLs as indicated by Functional Deficits. []? Progressing: []? Met: []? Not Met: []? Adjusted          Long Term Goals:  12 weeks  1. Disability index score of 20% or less for the QuickDash to assist with reaching prior level of function. []? Progressing: []? Met: []? Not Met: []? Adjusted      2. Patient will demonstrate increased AROM R shoulder = L shoulder to allow for proper joint functioning as indicated by patients Functional Deficits. []? Progressing: []? Met: []? Not Met: []? Adjusted       3.  Patient will demonstrate an increase in strength to 4/5 or greater in R shoulder and UE to allow for proper functional mobility as indicated by patients Functional Deficits. []? Progressing: []? Met: []? Not Met: []? Adjusted       4. Patient will return to all transfers, work activities, and functional activities without increased symptoms or restriction. []? Progressing: []? Met: []? Not Met: []? Adjusted       5. Patient will have 0-2/10 pain with ADL's.  []? Progressing: []? Met: []? Not Met: []? Adjusted       6. Patient stated goal: sleep through the night without waking due to pain  []? Progressing: []? Met: []? Not Met: []? Adjusted    Prognosis: [x]Good   []Fair   []Poor    Patient Requires Follow-up:  [x]Yes  []No    Plan: []Plan of care initiated     [x]Continue per plan of care    [] Alter current plan (see comments)    []Hold pending MD visit []Discharge    Charges:  Timed Code Treatment Minutes: 43   Total Treatment Minutes:  58   [facility] time for each procedure?:  TE TIME:  NMR TIME:  MANUAL TIME:  UNTIMED MINUTES:       [] EVAL (LOW) 98711 (typically 20 minutes face-to-face)  [] EVAL (MOD) 17358 (typically 30 minutes face-to-face)  [] EVAL (HIGH) 94638 (typically 45 minutes face-to-face)  [] RE-EVAL        [x] MG(51425) x1     [] IONTO  [] NMR (22497) x     [x]? VASO  [x]? Manual (98728) x2    [] Other:  [] TA x      [] Mech Traction (49948)  [] ES(attended) (42155)     [] ES (un) (19612): Medicare Cap total YTD:   [x]N/A    Electronically signed by:  ROSARIO [id number]        Note: If patient does not return for scheduled/ recommended follow up visits, this note will serve as a discharge from care along with most recent update on progress.

## 2021-09-14 ENCOUNTER — OFFICE VISIT (OUTPATIENT)
Dept: CARDIOLOGY CLINIC | Age: 76
End: 2021-09-14
Payer: MEDICARE

## 2021-09-14 VITALS
BODY MASS INDEX: 42.25 KG/M2 | OXYGEN SATURATION: 99 % | SYSTOLIC BLOOD PRESSURE: 122 MMHG | TEMPERATURE: 97.7 F | WEIGHT: 223.8 LBS | HEART RATE: 72 BPM | DIASTOLIC BLOOD PRESSURE: 70 MMHG | HEIGHT: 61 IN

## 2021-09-14 DIAGNOSIS — I10 BENIGN ESSENTIAL HTN: Primary | ICD-10-CM

## 2021-09-14 DIAGNOSIS — R55 SYNCOPE AND COLLAPSE: ICD-10-CM

## 2021-09-14 PROCEDURE — G8400 PT W/DXA NO RESULTS DOC: HCPCS | Performed by: INTERNAL MEDICINE

## 2021-09-14 PROCEDURE — 4040F PNEUMOC VAC/ADMIN/RCVD: CPT | Performed by: INTERNAL MEDICINE

## 2021-09-14 PROCEDURE — 1123F ACP DISCUSS/DSCN MKR DOCD: CPT | Performed by: INTERNAL MEDICINE

## 2021-09-14 PROCEDURE — G8427 DOCREV CUR MEDS BY ELIG CLIN: HCPCS | Performed by: INTERNAL MEDICINE

## 2021-09-14 PROCEDURE — G8417 CALC BMI ABV UP PARAM F/U: HCPCS | Performed by: INTERNAL MEDICINE

## 2021-09-14 PROCEDURE — 1036F TOBACCO NON-USER: CPT | Performed by: INTERNAL MEDICINE

## 2021-09-14 PROCEDURE — 1090F PRES/ABSN URINE INCON ASSESS: CPT | Performed by: INTERNAL MEDICINE

## 2021-09-14 PROCEDURE — 99214 OFFICE O/P EST MOD 30 MIN: CPT | Performed by: INTERNAL MEDICINE

## 2021-09-14 NOTE — PROGRESS NOTES
[address]   Cardiac Followup    Referring Provider:  [facility], MD     Chief Complaint   Patient presents with    Follow-Up from Hospital     syncope    Other     No new complaints or symptoms since hospital    Other     has had chest pains in the past        History of Present Illness:    Chris Schmidt is a 68 y.o. female who is here today for hospital follow up for falls and sinus bradycardia. She has a past medic al history of hypertension, hyperlipidemia, and diabetes mellitus. She presented to the hospital in 7/2020 after multiple falls, one resulting in a humerus fracture. Diltiazem stopped due to bradycardia. She wore a cardiac event monitor from 7/20-8/19/2021 which showed an average heart rate of 73 (), predominantly NSR, nocturnal bradycardia. An echocardiogram from 7/21/2021 showed an EF of 55-60%. Today she states she has been feeling well since she left the hospital. She has had not further dizziness or no syncope. She is tolerating her medications and taking them as prescribed. BP nad HR ok when cks at home. Patient currently denies any weight gain, edema, palpitations, chest pain, shortness of breath, dizziness, and syncope. Past Medical History:   has a past medical history of Hyperlipidemia, Hyperlipidemia associated with type 2 diabetes mellitus ([address] Hypertension, Morbid obesity with BMI of 40.0-44.9, adult ([address] Osteoarthrosis, not specified whether generalized/localized, lower leg, Paranoid type schizophrenia ([address] Type 2 diabetes mellitus without complication ([address] and Type II or unspecified type diabetes mellitus without mention of complication, not stated as uncontrolled. Surgical History:   has a past surgical history that includes joint replacement; blepharoplasty (5/10/2012); and Colonoscopy (8/1/2007). Social History:   reports that she has never smoked.  She has never used smokeless tobacco. She reports that she does not drink alcohol and does not use drugs. Family History:  family history includes Cancer in her sister; Dementia in her brother. Home Medications:  Prior to Admission medications    Medication Sig Start Date End Date Taking? Authorizing Provider   hydroCHLOROthiazide (HYDRODIURIL) 25 MG tablet TAKE 1 TABLET BY MOUTH  DAILY 9/8/21  Yes Julián Winter MD   amLODIPine (NORVASC) 5 MG tablet TAKE 1 TABLET DAILY 8/12/21  Yes Dayday Brooks MD   pioglitazone (ACTOS) 30 MG tablet TAKE 1 TABLET BY MOUTH ONCE DAILY 7/15/21  Yes Julián Winter MD   pravastatin (PRAVACHOL) 40 MG tablet TAKE 1 TABLET BY MOUTH ONCE DAILY 7/15/21  Yes Julián Winter MD   glimepiride (AMARYL) 4 MG tablet TAKE 1 TABLET BY MOUTH ONCE DAILY IN THE MORNING BEFORE BREAKFAST 7/15/21  Yes Juliná Winter MD   valsartan (DIOVAN) 160 MG tablet TAKE 1 TABLET BY MOUTH  DAILY 5/13/21  Yes Julián Winter MD   Accu-Chek Softclix Lancets MISC USE TO TEST TWICE A DAY. DX:E11.9 3/30/21  Yes Julián Winter MD   blood glucose test strips (ACCU-CHEK NEY PLUS) strip USE TO TEST TWICE A DAY. DX: E11.9 3/12/21  Yes Julián Winter MD   Calcium Carb-Cholecalciferol (CALCIUM PLUS VITAMIN D3) 600-800 MG-UNIT TABS Take by mouth 2 times daily   Yes Historical Provider, MD   Wheat Dextrin (BENEFIBER PO) Take by mouth as needed   Yes Historical Provider, MD   acetaminophen (TYLENOL) 325 MG tablet Take 325 mg by mouth as needed for Pain   Yes Historical Provider, MD   busPIRone (BUSPAR) 10 MG tablet Take 10 mg by mouth 2 times daily   Yes Historical Provider, MD   Blood Glucose Monitoring Suppl (ACCU-CHEK NEY PLUS) w/Device KIT Check twice daily. DX: E11.9 1/22/18  Yes Julián Winter MD   Lancets MISC One every day 5/1/12  Yes Julián Winter MD   aspirin 81 MG EC tablet Take 81 mg by mouth daily. Yes Historical Provider, MD   risperidone (RISPERDAL) 2 MG tablet Take 2 mg by mouth daily. Yes Historical Provider, MD        Allergies:  Tessalon [benzonatate], Diazepam, Diclofenac sodium, Lycopene, Metformin, Olanzapine, Tape [adhesive tape], and Zocor [simvastatin - high dose]     Review of Systems:   · Constitutional: there has been no unanticipated weight loss. There's been no change in energy level, sleep pattern, or activity level. · Eyes: No visual changes or diplopia. No scleral icterus. · ENT: No Headaches, hearing loss or vertigo. No mouth sores or sore throat. · Cardiovascular: Reviewed in HPI  · Respiratory: No cough or wheezing, no sputum production. No hematemesis. · Gastrointestinal: No abdominal pain, appetite loss, blood in stools. No change in bowel or bladder habits. · Genitourinary: No dysuria, trouble voiding, or hematuria. · Musculoskeletal:  No gait disturbance, weakness or joint complaints. · Integumentary: No rash or pruritis. · Neurological: No headache, diplopia, change in muscle strength, numbness or tingling. No change in gait, balance, coordination, mood, affect, memory, mentation, behavior. · Psychiatric: No anxiety, no depression. · Endocrine: No malaise, fatigue or temperature intolerance. No excessive thirst, fluid intake, or urination. No tremor. · Hematologic/Lymphatic: No abnormal bruising or bleeding, blood clots or swollen lymph nodes. · Allergic/Immunologic: No nasal congestion or hives.     Physical Examination:    Vitals:    09/14/21 1044   BP: 122/70   Pulse: 72   Temp: 97.7 °F (36.5 °C)   SpO2: 99%        Constitutional and General Appearance: NAD   Respiratory:  · Normal excursion and expansion without use of accessory muscles  · Resp Auscultation: Normal breath sounds without dullness  Cardiovascular:  · The apical impulses not displaced  · Heart tones are crisp and normal  · Cervical veins are not engorged  · The carotid upstroke is normal in amplitude and contour without delay or bruit  · Normal S1S2, No S3, No Murmur  · Peripheral pulses are symmetrical and full  · There is no clubbing, cyanosis of the extremities. · No edema  · Femoral Arteries: 2+ and equal  · Pedal Pulses: 2+ and equal   Abdomen:  · No masses or tenderness  · Liver/Spleen: No Abnormalities Noted  Neurological/Psychiatric:  · Alert and oriented in all spheres  · Moves all extremities well  · Exhibits normal gait balance and coordination  · No abnormalities of mood, affect, memory, mentation, or behavior are noted    EKG 7/19/2021  Sinus bradycardia  Low voltage QRS      Echocardiogram 7/21/2021  Summary   Left ventricular systolic function is normal with ejection fraction   estimated at 55-60 %. No regional wall motion abnormalities are noted. Left ventricular size is decreased. There is mild concentric left ventricular hypertrophy. Grade I diastolic dysfunction with normal filling pressure. The right atrium is mildly dilated. Mild posterior mitral annular calcification is present. Aortic valve sclerosis without aortic stenosis. Mild tricuspid regurgitation. Normal systolic pulmonary artery pressure (SPAP) estimated at 39 mmHg (RA   pressure 3 mmHg). Mild pulmonic regurgitation present. 7/14/2015 55%, LVH, trace TR          Assessment:   Abnormal EKG  Sinus bradycardia - HR stable since stop dilt  Hypertension - controlled post med changes. Discussed w/ pt   Hyperlipidemia - stable  Diabetes Mellitus   History of Humerus fracture after a fall   Frequent falls - appears not cardiac. No recurrence     Plan:  Cardiac medications reviewed including indications and pertinent side effects. No changes. Refills provided as necessary. Check blood pressure and heart rate at home a few times per week- keep a log with dates and times and bring to office visit   Regular exercise and following a healthy diet encouraged   Follow up with me as needed   Follow up with PCP     Scribe's attestation:   This note was scribed in the presence of Dr. Zoraida Smith M.D. By Cheko Trujillo RN    The scribes documentation has been prepared under my direction and personally reviewed by me in its entirety. I confirm that the note above accurately reflects all work, treatment, procedures, and medical decision making performed by me. Dr. Yusuf Wilson MD        Thank you for allowing me to participate in the care of this individual.      Jodi Pham.  Clinton Vásquez M.D., Amaris Jean-Baptiste

## 2021-09-14 NOTE — LETTER
Shannon Holloway  [birth year]  [facility]   Cardiac Followup    Referring Provider:  Bella Pennington MD     Chief Complaint   Patient presents with    Follow-Up from Hospital     syncope    Other     No new complaints or symptoms since hospital    Other     has had chest pains in the past        History of Present Illness:    Shannon Holloway is a 68 y.o. female who is here today for hospital follow up for falls and sinus bradycardia. She has a past medic al history of hypertension, hyperlipidemia, and diabetes mellitus. She presented to the hospital in 7/2020 after multiple falls, one resulting in a humerus fracture. Diltiazem stopped due to bradycardia. She wore a cardiac event monitor from 7/20-8/19/2021 which showed an average heart rate of 73 (), predominantly NSR, nocturnal bradycardia. An echocardiogram from 7/21/2021 showed an EF of 55-60%. Today she states she has been feeling well since she left the hospital. She has had not further dizziness or no syncope. She is tolerating her medications and taking them as prescribed. BP nad HR ok when cks at home. Patient currently denies any weight gain, edema, palpitations, chest pain, shortness of breath, dizziness, and syncope. Past Medical History:   has a past medical history of Hyperlipidemia, Hyperlipidemia associated with type 2 diabetes mellitus ([address] Hypertension, Morbid obesity with BMI of 40.0-44.9, adult ([address] Osteoarthrosis, not specified whether generalized/localized, lower leg, Paranoid type schizophrenia ([address] Type 2 diabetes mellitus without complication ([address] and Type II or unspecified type diabetes mellitus without mention of complication, not stated as uncontrolled. Surgical History:   has a past surgical history that includes joint replacement; blepharoplasty (5/10/2012); and Colonoscopy (8/1/2007). Social History:   reports that she has never smoked.  She has never used smokeless tobacco. She reports that she does not drink alcohol and does not use drugs. Family History:  family history includes Cancer in her sister; Dementia in her brother. Home Medications:  Prior to Admission medications    Medication Sig Start Date End Date Taking? Authorizing Provider   hydroCHLOROthiazide (HYDRODIURIL) 25 MG tablet TAKE 1 TABLET BY MOUTH  DAILY 9/8/21  Yes Nat Nugent MD   amLODIPine (NORVASC) 5 MG tablet TAKE 1 TABLET DAILY 8/12/21  Yes Monica Gongora MD   pioglitazone (ACTOS) 30 MG tablet TAKE 1 TABLET BY MOUTH ONCE DAILY 7/15/21  Yes Nat Nugent MD   pravastatin (PRAVACHOL) 40 MG tablet TAKE 1 TABLET BY MOUTH ONCE DAILY 7/15/21  Yes Nat Nugent MD   glimepiride (AMARYL) 4 MG tablet TAKE 1 TABLET BY MOUTH ONCE DAILY IN THE MORNING BEFORE BREAKFAST 7/15/21  Yes Nat Nugent MD   valsartan (DIOVAN) 160 MG tablet TAKE 1 TABLET BY MOUTH  DAILY 5/13/21  Yes Nat Nugent MD   Accu-Chek Softclix Lancets MISC USE TO TEST TWICE A DAY. DX:E11.9 3/30/21  Yes Nat Nugent MD   blood glucose test strips (ACCU-CHEK NEY PLUS) strip USE TO TEST TWICE A DAY. DX: E11.9 3/12/21  Yes Nat Nugent MD   Calcium Carb-Cholecalciferol (CALCIUM PLUS VITAMIN D3) 600-800 MG-UNIT TABS Take by mouth 2 times daily   Yes Historical Provider, MD   Wheat Dextrin (BENEFIBER PO) Take by mouth as needed   Yes Historical Provider, MD   acetaminophen (TYLENOL) 325 MG tablet Take 325 mg by mouth as needed for Pain   Yes Historical Provider, MD   busPIRone (BUSPAR) 10 MG tablet Take 10 mg by mouth 2 times daily   Yes Historical Provider, MD   Blood Glucose Monitoring Suppl (ACCU-CHEK NEY PLUS) w/Device KIT Check twice daily. DX: E11.9 1/22/18  Yes Nat Nugent MD   Lancets MISC One every day 5/1/12  Yes Nat Nugent MD   aspirin 81 MG EC tablet Take 81 mg by mouth daily.      Yes Historical Provider, MD   risperidone (RISPERDAL) 2 MG tablet Take 2 mg by mouth daily. Yes Historical Provider, MD        Allergies:  Tessalon [benzonatate], Diazepam, Diclofenac sodium, Lycopene, Metformin, Olanzapine, Tape [adhesive tape], and Zocor [simvastatin - high dose]     Review of Systems:   · Constitutional: there has been no unanticipated weight loss. There's been no change in energy level, sleep pattern, or activity level. · Eyes: No visual changes or diplopia. No scleral icterus. · ENT: No Headaches, hearing loss or vertigo. No mouth sores or sore throat. · Cardiovascular: Reviewed in HPI  · Respiratory: No cough or wheezing, no sputum production. No hematemesis. · Gastrointestinal: No abdominal pain, appetite loss, blood in stools. No change in bowel or bladder habits. · Genitourinary: No dysuria, trouble voiding, or hematuria. · Musculoskeletal:  No gait disturbance, weakness or joint complaints. · Integumentary: No rash or pruritis. · Neurological: No headache, diplopia, change in muscle strength, numbness or tingling. No change in gait, balance, coordination, mood, affect, memory, mentation, behavior. · Psychiatric: No anxiety, no depression. · Endocrine: No malaise, fatigue or temperature intolerance. No excessive thirst, fluid intake, or urination. No tremor. · Hematologic/Lymphatic: No abnormal bruising or bleeding, blood clots or swollen lymph nodes. · Allergic/Immunologic: No nasal congestion or hives.     Physical Examination:    Vitals:    09/14/21 1044   BP: 122/70   Pulse: 72   Temp: 97.7 °F (36.5 °C)   SpO2: 99%        Constitutional and General Appearance: NAD   Respiratory:  · Normal excursion and expansion without use of accessory muscles  · Resp Auscultation: Normal breath sounds without dullness  Cardiovascular:  · The apical impulses not displaced  · Heart tones are crisp and normal  · Cervical veins are not engorged  · The carotid upstroke is normal in amplitude and contour without delay or bruit  · Normal S1S2, No S3, No Murmur  · Peripheral pulses are symmetrical and full  · There is no clubbing, cyanosis of the extremities. · No edema  · Femoral Arteries: 2+ and equal  · Pedal Pulses: 2+ and equal   Abdomen:  · No masses or tenderness  · Liver/Spleen: No Abnormalities Noted  Neurological/Psychiatric:  · Alert and oriented in all spheres  · Moves all extremities well  · Exhibits normal gait balance and coordination  · No abnormalities of mood, affect, memory, mentation, or behavior are noted    EKG 7/19/2021  Sinus bradycardia  Low voltage QRS      Echocardiogram 7/21/2021  Summary   Left ventricular systolic function is normal with ejection fraction   estimated at 55-60 %. No regional wall motion abnormalities are noted. Left ventricular size is decreased. There is mild concentric left ventricular hypertrophy. Grade I diastolic dysfunction with normal filling pressure. The right atrium is mildly dilated. Mild posterior mitral annular calcification is present. Aortic valve sclerosis without aortic stenosis. Mild tricuspid regurgitation. Normal systolic pulmonary artery pressure (SPAP) estimated at 39 mmHg (RA   pressure 3 mmHg). Mild pulmonic regurgitation present. 7/14/2015 55%, LVH, trace TR          Assessment:   Abnormal EKG  Sinus bradycardia - HR stable since stop dilt  Hypertension - controlled post med changes. Discussed w/ pt   Hyperlipidemia - stable  Diabetes Mellitus   History of Humerus fracture after a fall   Frequent falls - appears not cardiac. No recurrence     Plan:  Cardiac medications reviewed including indications and pertinent side effects. No changes. Refills provided as necessary. Check blood pressure and heart rate at home a few times per week- keep a log with dates and times and bring to office visit   Regular exercise and following a healthy diet encouraged   Follow up with me as needed   Follow up with PCP     Mary Lou's attestation:   This note was scribed in the presence of Dr. Annamarie Sue M.D. By [facility] RN    The scribes documentation has been prepared under my direction and personally reviewed by me in its entirety. I confirm that the note above accurately reflects all work, treatment, procedures, and medical decision making performed by me. Dr. Annamarie Sue MD        Thank you for allowing me to participate in the care of this individual.      Sanchez Mc.  Angela Ashby M.D., Reuben Armstrong

## 2021-09-14 NOTE — PATIENT INSTRUCTIONS
Plan:  Cardiac medications reviewed including indications and pertinent side effects    Check blood pressure and heart rate at home a few times per week- keep a log with dates and times and bring to office visit   Regular exercise and following a healthy diet encouraged   Follow up with me as needed   Follow up with PCP

## 2021-09-16 ENCOUNTER — HOSPITAL ENCOUNTER (OUTPATIENT)
Dept: PHYSICAL THERAPY | Age: 76
Setting detail: THERAPIES SERIES
Discharge: HOME | End: 2021-09-16
Payer: MEDICARE

## 2021-09-16 PROCEDURE — 97016 VASOPNEUMATIC DEVICE THERAPY: CPT

## 2021-09-16 PROCEDURE — 97140 MANUAL THERAPY 1/> REGIONS: CPT

## 2021-09-16 PROCEDURE — 97110 THERAPEUTIC EXERCISES: CPT

## 2021-09-16 NOTE — FLOWSHEET NOTE
[address] [place] [address] [facility] MICHELLE Aceves [address]  Phone [phone]  Fax [phone]    Outpatient Physical Therapy     [x] Daily Treatment Note   [] Progress Note   [] Discharge Note    Date:  2021    Patient Name:  Lavonne Sidhu   \"Pat\"       YOB: 1945    Medical Diagnosis: Closed displaced fx of greater tuberosity of R humerus with routine healing (S42.251D)         Treatment Diagnosis: limited ROM, weakness, poor posture, swelling, decreased functional status                          Onset Date:    21  (DOI)               Referral Date:  21                 Referring Physician: Claude Comber, MD                                                    Visits Allowed/Insurance/Certification Information: [facility] Medicare, med Briantiffanie Farooq, no auth     Restrictions/Precautions: no AROM R shoulder until 8 weeks post-injury (21), PROM, AAROM only, no strengthening    Plan of care sent to provider:   []NA   []Faxed   [x]Co-signature       Plan of care signed:    []NA   [x]Yes 21   []No      Progress report will be due (10 Rx or 30 days whichever is less): 21    Sees MD again     Recertification will be due (POC Duration  / 90 days whichever is less): 21     Visit# / total visits:     Visit # Insurance Allowable Auth Required   In Person  Med nec []  Yes     [x]  No    Tele Health 0  []  Yes     []  No    Total        Plan for Next Session:  Continue with PROM, add AAROM for HEP      Subjective   Reports she forgot her xtra strength tylenol this morning and has not taken any tylenol at all. Reports she is out of her sling at night now for two nights   Performing HEP 3x/day.         21 Had xrays as follows:  Pertinent radiographs/imagin view right shoulder AP gray she scapular Y and Velpeau view 2021: Consolidating greater tuberosity fracture with healing callus and bridging present. Small anterior inferior glenoid fracture unchanged. Minimal displacement. Small ossific density in the axillary recess/pouch-unchanged. Per MD note on 8/30/21, \"Physical therapy will continue to progress her with full range of motion and increase ADLs. I do not want them to work on active range of motion ~8-week point. She may come out of her sling at home at this point and do waist level activity. Passive and active assisted range of motion per physical therapy is also acceptable. Otherwise she may sleep in a sling for an additional 2 weeks ~8-week post injury point.  -No lifting greater than 2 to 5 pounds at this point. Again at waist level starting  -OTC Tylenol per bottle as needed discomfort  -Physical therapy will also work on pain modalities\"    Patient is not wearing her sling during the day, but is wearing it at night. Sees MD again 10/18/21. Pain level:  Currently 3/10     At times 0/10 at rest, 5/10 at worst since last visit. AT EVAL: Patient describes pain to be intermittent in R shoulder in proximal humerus  Patient reports pain is  0/10 pain at present and  4/10 pain at its worst.  Worsened by: Any kind of movement of R arm        Objective:     PROM:  R shoulder flex 140, abd 100, ER 52 in 90 deg abd, IR 70 in 90 deg abd discomfort with all motions       Exercises:   fx 7/19/21  End of 8 wk 9/13/21  Exercises in bold performed in department today.   Items not bolded are carried forward from prior visits for continuity of the record  Exercise/Equipment Resistance/Repetitions Issued for HEP     pulleys x5 min PT present for subjective info     AROM elbow flex/ext  Wrist flex/ext  Pronation/supination x10 each direction in sitting or supine x      strength  Yellow foam x   Supine back pocket shrugs and scap retraction 2x10 of each x   codmans x10 x   Table slides  x10 x   Axial elongation x10 x   Shoulder shrugs 2x10 x   Scapular clock (seated) x10 each (12/6, 3/9) x Supine arom shoulder flex   2x5 elbow bent  prog to 2x15 x   Supine or sit arom should IR/ER 2x5-15 x                                                      Therapeutic Exercise/Home Exercise Program:   x15 min. Reviewed and progressed exercises as noted above. Reviewed arom no higher than 90 degrees    Group Therapy:    0 minutes    Therapeutic Activity:  0 minutes     Gait: 0 minutes    Neuromuscular Re-Education:  0 minutes      Canalith Repositioning Procedure:  0 minutes    Manual Therapy:  23 minutes  PROM:  R shoulder in supine, flex, abd, ER, IR, elbow flex/ext, sup/pronation  Gentle inf oscillations for relaxation and pain relief    Modalities: 15 minutes    [x] GAME READY (VASO)- for significant edema, swelling, pain control, 38 deg, min pressure, with patient seated     Functional Outcome Measure:   []NA  Measure Used: Stewart Moreland  Date Assessed: 8/25/21  Score:  70%    Assessment/Treatment/Activity Tolerance:    Patients response to treatment:   [x]Patient tolerated treatment well with no adverse reactions noted    []Patient limited by fatigue   []Patient limited by pain    []Patient limited by other medical complications   [x]Other: pain 0/10 after treatment    Goals:   Progress towards goals:  Goals established on 8/25/21  GOALS:  Short Term Goals:  2 weeks  1. Independent in HEP and progression per patient tolerance, in order to prevent re-injury. []? Progressing: []? Met: []? Not Met: []? Adjusted       2. Patient will have a decrease in pain to facilitate improvement in movement, function, and ADLs as indicated by Functional Deficits. []? Progressing: []? Met: []? Not Met: []? Adjusted          Long Term Goals:  12 weeks  1. Disability index score of 20% or less for the QuickDash to assist with reaching prior level of function. []? Progressing: []? Met: []? Not Met: []? Adjusted      2.  Patient will demonstrate increased AROM R shoulder = L shoulder to allow for proper joint functioning as indicated by patients Functional Deficits. []? Progressing: []? Met: []? Not Met: []? Adjusted       3. Patient will demonstrate an increase in strength to 4/5 or greater in R shoulder and UE to allow for proper functional mobility as indicated by patients Functional Deficits. []? Progressing: []? Met: []? Not Met: []? Adjusted       4. Patient will return to all transfers, work activities, and functional activities without increased symptoms or restriction. []? Progressing: []? Met: []? Not Met: []? Adjusted       5. Patient will have 0-2/10 pain with ADL's.  []? Progressing: []? Met: []? Not Met: []? Adjusted       6. Patient stated goal: sleep through the night without waking due to pain  []? Progressing: []? Met: []? Not Met: []? Adjusted    Prognosis: [x]Good   []Fair   []Poor    Patient Requires Follow-up:  [x]Yes  []No    Plan: []Plan of care initiated     [x]Continue per plan of care    [] Alter current plan (see comments)    []Hold pending MD visit []Discharge    Charges:  Timed Code Treatment Minutes: 38   Total Treatment Minutes:  53   2858 [facility] time for each procedure?:  TE TIME:  NMR TIME:  MANUAL TIME:  UNTIMED MINUTES:       [] EVAL (LOW) 14405 (typically 20 minutes face-to-face)  [] EVAL (MOD) 17913 (typically 30 minutes face-to-face)  [] EVAL (HIGH) 25922 (typically 45 minutes face-to-face)  [] RE-EVAL        [x] RS(88006) x1     [] IONTO  [] NMR (00201) x     [x]? VASO  [x]? Manual (81396) x2    [] Other:  [] TA x      [] Mech Traction (39303)  [] ES(attended) (48410)     [] ES (un) (01047): Medicare Cap total YTD:   [x]N/A    Electronically signed by:  ROSE MARIE [id number]        Note: If patient does not return for scheduled/ recommended follow up visits, this note will serve as a discharge from care along with most recent update on progress.

## 2021-09-21 ENCOUNTER — HOSPITAL ENCOUNTER (OUTPATIENT)
Dept: PHYSICAL THERAPY | Age: 76
Setting detail: THERAPIES SERIES
Discharge: HOME | End: 2021-09-21
Payer: MEDICARE

## 2021-09-21 PROCEDURE — 97016 VASOPNEUMATIC DEVICE THERAPY: CPT

## 2021-09-21 PROCEDURE — 97110 THERAPEUTIC EXERCISES: CPT

## 2021-09-21 PROCEDURE — 97140 MANUAL THERAPY 1/> REGIONS: CPT

## 2021-09-21 NOTE — FLOWSHEET NOTE
[address] [place] [address] and Sports Rehabilitation, [address], [facility], [place] [address]  Phone [phone]  Fax [phone]    Outpatient Physical Therapy     [x] Daily Treatment Note   [x] Progress Note  9/21/21 [] Discharge Note    Date:  9/21/2021    Patient Name:  Anastasiya Ramos   \"Pat\"       YOB: 1945    Medical Diagnosis: Closed displaced fx of greater tuberosity of R humerus with routine healing (S42.251D)         Treatment Diagnosis: limited ROM, weakness, poor posture, swelling, decreased functional status                          Onset Date:    7/19/21  (DOI)               Referral Date:  8/9/21                 Referring Physician: Terri Siddiqi MD                                                    Visits Allowed/Insurance/Certification Information: [facility] Medicare, jessi Mireles, no auth     Restrictions/Precautions: no AROM R shoulder until 8 weeks post-injury (9/13/21), PROM, AAROM only, no strengthening    Plan of care sent to provider:   []NA   []Faxed   [x]Co-signature       Plan of care signed:    []NA   [x]Yes 8/25/21   []No      Progress report will be due (10 Rx or 30 days whichever is less): 10/19/21    Sees MD again 30/35/88    Recertification will be due (POC Duration  / 90 days whichever is less): 11/25/21     Visit# / total visits:     Visit # Insurance Allowable Auth Required   In Person 9/24 Jessi Mireles []  Yes     [x]  No    [facility] 0  []  Yes     []  No    Total 9/24       Plan for Next Session:  Continue with AROM and strengthening exercises, add shoulder isometrics, scap retraction with tband      Subjective   Patient doing well. Reports a 55% improvement with PT treatment. She is not wearing the sling anymore. Taking tylenol before therapy for pain control. She is sleeping through the night without waking due to pain. Performing HEP 3x/day.   She is now able to reach for her faucet at the kitchen sink, wash her hair, and tie her shoes with R UE. She is still having difficulty with reaching above shoulder level or behind her back. She is able to lift light objects, <5#.           21 Had xrays as follows:  Pertinent radiographs/imagin view right shoulder AP gray she scapular Y and Velpeau view 2021: Consolidating greater tuberosity fracture with healing callus and bridging present. Small anterior inferior glenoid fracture unchanged. Minimal displacement. Small ossific density in the axillary recess/pouch-unchanged. Per MD note on 21, \"Physical therapy will continue to progress her with full range of motion and increase ADLs. I do not want them to work on active range of motion ~8-week point. She may come out of her sling at home at this point and do waist level activity. Passive and active assisted range of motion per physical therapy is also acceptable. Otherwise she may sleep in a sling for an additional 2 weeks ~8-week post injury point.  -No lifting greater than 2 to 5 pounds at this point. Again at waist level starting  -OTC Tylenol per bottle as needed discomfort  -Physical therapy will also work on pain modalities\"      Pain level:  Currently 0/10, 4-5/10 at worst since last visit. AT EVAL: Patient describes pain to be intermittent in R shoulder in proximal humerus  Patient reports pain is  0/10 pain at present and  4/10 pain at its worst.  Worsened by: Any kind of movement of R arm        Objective:     PROM:  R shoulder flex 150, abd 140, ER 65 in 90 deg abd, IR 50 in 90 deg abd   AROM:  R shoulder flex 90, abd 85, Ext 55  Palpation:  Tender over R anterior shoulder    Exercises:   fx 21  End of 9 wk 21  Exercises in bold performed in department today.   Items not bolded are carried forward from prior visits for continuity of the record  Exercise/Equipment Resistance/Repetitions Issued for HEP     pulleys x5 min PT present for subjective info     AAROM  Shoulder flex, abd, ER with cane x5 each direction in supine x      strength  Yellow foam x   Supine back pocket shrugs and scap retraction 2x10 of each x   codmans x10 x   Table slides  x10 x   Axial elongation x10 x   Shoulder shrugs 2x10 x   Scapular clock (seated) x10 each (12/6, 3/9) x   Supine ceiling punches 2x10 x   Supine figure 8 2x10 x     Supine chest press 2x10     sidelying shoulder abd to 90  2x10     sidelying shoulder ER with arm at side 2x10                                  Therapeutic Exercise/Home Exercise Program:   x30 min. Reviewed and progressed exercises as noted above with new handout given. Assessment done for progress note. Group Therapy:    0 minutes    Therapeutic Activity:  0 minutes     Gait: 0 minutes    Neuromuscular Re-Education:  0 minutes      Canalith Repositioning Procedure:  0 minutes    Manual Therapy:  15 minutes  PROM:  R shoulder in supine, flex, abd, ER, IR, elbow flex/ext, sup/pronation  Gentle inf oscillations for relaxation and pain relief    Modalities: 15 minutes    [x] GAME READY (VASO)- for significant edema, swelling, pain control, 38 deg, min pressure, with patient seated     Functional Outcome Measure:   []NA  Measure Used: Destiny [place]  Date Assessed: 8/25/21  Score:  70%    Assessment/Treatment/Activity Tolerance:  Patient making great progress with PT treatment. ROM progressing appropriately. Started with AROM as indicated per MD note on 8/30/21. NO concerns at this time. Recommend a continuation of PT treatment 2x/week for another 4-8 weeks progressing towards remaining goals. Patients response to treatment:   [x]Patient tolerated treatment well with no adverse reactions noted    []Patient limited by fatigue   []Patient limited by pain    []Patient limited by other medical complications   [x]Other: pain 0/10 after treatment    Goals:   Progress towards goals:  STG's MET. LTG #6 MET. Progressing towards remaining LTG's. GOALS:  Short Term Goals:  2 weeks  1.  Independent in HEP and progression per patient tolerance, in order to prevent re-injury. []? Progressing: [x]? Met: []? Not Met: []? Adjusted       2. Patient will have a decrease in pain to facilitate improvement in movement, function, and ADLs as indicated by Functional Deficits. []? Progressing: [x]? Met: []? Not Met: []? Adjusted          Long Term Goals:  12 weeks  1. Disability index score of 20% or less for the QuickDash to assist with reaching prior level of function. [x]? Progressing: []? Met: []? Not Met: []? Adjusted      2. Patient will demonstrate increased AROM R shoulder = L shoulder to allow for proper joint functioning as indicated by patients Functional Deficits. [x]? Progressing: []? Met: []? Not Met: []? Adjusted       3. Patient will demonstrate an increase in strength to 4/5 or greater in R shoulder and UE to allow for proper functional mobility as indicated by patients Functional Deficits. [x]? Progressing: []? Met: []? Not Met: []? Adjusted       4. Patient will return to all transfers, work activities, and functional activities without increased symptoms or restriction. [x]? Progressing: []? Met: []? Not Met: []? Adjusted       5. Patient will have 0-2/10 pain with ADL's. [x]? Progressing: []? Met: []? Not Met: []? Adjusted       6. Patient stated goal: sleep through the night without waking due to pain  []? Progressing: [x]? Met: []? Not Met: []?  Adjusted    Prognosis: [x]Good   []Fair   []Poor    Patient Requires Follow-up:  [x]Yes  []No    Plan: []Plan of care initiated     [x]Continue per plan of care    [] Alter current plan (see comments)    []Hold pending MD visit []Discharge    Charges:  Timed Code Treatment Minutes: 45   Total Treatment Minutes:  60   4042 [facility] time for each procedure?:  TE TIME:  NMR TIME:  MANUAL TIME:  UNTIMED MINUTES:       [] EVAL (LOW) 06667 (typically 20 minutes face-to-face)  [] EVAL (MOD) 21950 (typically 30 minutes face-to-face)  [] EVAL (HIGH) 96714 (typically 45 minutes face-to-face)  [] RE-EVAL        [x] KU(61275) x2     [] IONTO  [] NMR (25528) x     [x]? VASO  [x]? Manual (29807) x1    [] Other:  [] TA x      [] Mech Traction (45979)  [] ES(attended) (77983)     [] ES (un) (49207): Medicare Cap total YTD:   [x]N/A    Electronically signed by:  Cheryl Sanchez, PT, MPT, OMT-C 2018          Note: If patient does not return for scheduled/ recommended follow up visits, this note will serve as a discharge from care along with most recent update on progress.

## 2021-09-23 ENCOUNTER — HOSPITAL ENCOUNTER (OUTPATIENT)
Dept: PHYSICAL THERAPY | Age: 76
Setting detail: THERAPIES SERIES
Discharge: HOME | End: 2021-09-23
Payer: MEDICARE

## 2021-09-23 ENCOUNTER — TELEPHONE (OUTPATIENT)
Dept: INTERNAL MEDICINE CLINIC | Age: 76
End: 2021-09-23

## 2021-09-23 PROCEDURE — 97016 VASOPNEUMATIC DEVICE THERAPY: CPT

## 2021-09-23 PROCEDURE — 97140 MANUAL THERAPY 1/> REGIONS: CPT

## 2021-09-23 PROCEDURE — 97110 THERAPEUTIC EXERCISES: CPT

## 2021-09-23 NOTE — TELEPHONE ENCOUNTER
----- Message from Jorge L Cooper MD sent at 9/23/2021  1:07 PM EDT -----  Contact: Rhonda Baez [facility]  ----- Message -----  From: Mary Mueller  Sent: 9/23/2021   8:15 AM EDT  To: oJrge L Cooper MD    Patient states she has a rash on her hands and arms that is spreading. It is red and itching. She is confident it is not poison ivy but it is plant related. She has had it 4 days.      Thank you     [facility]

## 2021-09-23 NOTE — FLOWSHEET NOTE
[address] [place] [address] and Sports Rehabilitation, [address]  Phone [phone]  Fax [phone]    Outpatient Physical Therapy     [x] Daily Treatment Note   [x] Progress Note  21 [] Discharge Note    Date:  2021    Patient Name:  Sandor Labor   \"Pat\"       YOB: 1945    Medical Diagnosis: Closed displaced fx of greater tuberosity of R humerus with routine healing (S42.251D)         Treatment Diagnosis: limited ROM, weakness, poor posture, swelling, decreased functional status                          Onset Date:    21  (DOI)               Referral Date:  21                 Referring Physician: Von Lizama MD                                                    Visits Allowed/Insurance/Certification Information: [facility] Medicare, med Sarthak Ego, no auth     Restrictions/Precautions: no AROM R shoulder until 8 weeks post-injury (21), PROM, AAROM only, no strengthening    Plan of care sent to provider:   []NA   []Faxed   [x]Co-signature       Plan of care signed:    []NA   [x]Yes 21   []No      Progress report will be due (10 Rx or 30 days whichever is less): 10/19/21    Sees MD again     Recertification will be due (POC Duration  / 90 days whichever is less): 21     Visit# / total visits:     Visit # Insurance Allowable Auth Required   In Person 10/24 Med nec []  Yes     [x]  No    Tele Health 0  []  Yes     []  No    Total 10/24       Plan for Next Session:  Continue with AROM and strengthening exercises, add shoulder isometrics, scap retraction with tband      Subjective   Patient reports she took tylenol this morning for therapy.   Reports she has some kind of skin reaction (some type of poison plant)  She is able to lift light objects, <5#.           21 Had xrays as follows:  Pertinent radiographs/imagin view right shoulder AP gray she scapular Y and Velpeau view 2021: Consolidating greater tuberosity fracture with healing callus and bridging present. Small anterior inferior glenoid fracture unchanged. Minimal displacement. Small ossific density in the axillary recess/pouch-unchanged. Per MD note on 8/30/21, \"Physical therapy will continue to progress her with full range of motion and increase ADLs. I do not want them to work on active range of motion ~8-week point. She may come out of her sling at home at this point and do waist level activity. Passive and active assisted range of motion per physical therapy is also acceptable. Otherwise she may sleep in a sling for an additional 2 weeks ~8-week post injury point.  -No lifting greater than 2 to 5 pounds at this point. Again at waist level starting  -OTC Tylenol per bottle as needed discomfort  -Physical therapy will also work on pain modalities\"      Pain level:  Currently 3/10, 4-5/10 at worst since last visit. AT EVAL: Patient describes pain to be intermittent in R shoulder in proximal humerus  Patient reports pain is  0/10 pain at present and  4/10 pain at its worst.  Worsened by: Any kind of movement of R arm        Objective:     PROM:  R shoulder flex 160, abd 140, ER 65 in 90 deg abd, IR 60 in 90 deg abd       Exercises:   fx 7/19/21  End of 9 wk 9/20/21  Exercises in bold performed in department today.   Items not bolded are carried forward from prior visits for continuity of the record  Exercise/Equipment Resistance/Repetitions Issued for HEP     pulleys x5 min PT present for subjective info     AAROM  Shoulder flex, abd, ER with cane x5 each direction in supine x      strength  Yellow foam x   Supine back pocket shrugs and scap retraction 2x10 of each x   codmans x10 x   Table slides  x10 x   Axial elongation x10 x   Shoulder shrugs 2x10 x   Scapular clock (seated) x10 each (12/6, 3/9) x   Supine ceiling punches 2x10 x   Supine figure 8 2x10 x     Supine chest press 2x10     sidelying shoulder abd to 90  2x10 sidelying shoulder ER with arm at side 2x10                                  Therapeutic Exercise/Home Exercise Program:   x30 min. Reviewed and progressed exercises as noted above     Group Therapy:    0 minutes    Therapeutic Activity:  0 minutes     Gait: 0 minutes     Neuromuscular Re-Education:  0 minutes      Canalith Repositioning Procedure:  0 minutes    Manual Therapy:  15 minutes  PROM:  R shoulder in supine, flex, abd, ER, IR, elbow flex/ext, sup/pronation  Gentle inf oscillations for relaxation and pain relief    Modalities: 15 minutes    [x] GAME READY (VASO)- for significant edema, swelling, pain control, 38 deg, min pressure, with patient seated     Functional Outcome Measure:   []NA  Measure Used: Sean Hernández  Date Assessed: 8/25/21  Score:  70%    Assessment/Treatment/Activity Tolerance:  9/21/21Patient making great progress with PT treatment. ROM progressing appropriately. Started with AROM as indicated per MD note on 8/30/21. NO concerns at this time. Recommend a continuation of PT treatment 2x/week for another 4-8 weeks progressing towards remaining goals. Patients response to treatment:   [x]Patient tolerated treatment well with no adverse reactions noted    []Patient limited by fatigue   []Patient limited by pain    []Patient limited by other medical complications   [x]Other: pain 0/10 after treatment    Goals:   Progress towards goals:  STG's MET. LTG #6 MET. Progressing towards remaining LTG's. GOALS:  Short Term Goals:  2 weeks  1. Independent in HEP and progression per patient tolerance, in order to prevent re-injury. []? Progressing: [x]? Met: []? Not Met: []? Adjusted       2. Patient will have a decrease in pain to facilitate improvement in movement, function, and ADLs as indicated by Functional Deficits. []? Progressing: [x]? Met: []? Not Met: []? Adjusted          Long Term Goals:  12 weeks  1.  Disability index score of 20% or less for the QuickDash to assist with reaching prior level of function. [x]? Progressing: []? Met: []? Not Met: []? Adjusted      2. Patient will demonstrate increased AROM R shoulder = L shoulder to allow for proper joint functioning as indicated by patients Functional Deficits. [x]? Progressing: []? Met: []? Not Met: []? Adjusted       3. Patient will demonstrate an increase in strength to 4/5 or greater in R shoulder and UE to allow for proper functional mobility as indicated by patients Functional Deficits. [x]? Progressing: []? Met: []? Not Met: []? Adjusted       4. Patient will return to all transfers, work activities, and functional activities without increased symptoms or restriction. [x]? Progressing: []? Met: []? Not Met: []? Adjusted       5. Patient will have 0-2/10 pain with ADL's. [x]? Progressing: []? Met: []? Not Met: []? Adjusted       6. Patient stated goal: sleep through the night without waking due to pain  []? Progressing: [x]? Met: []? Not Met: []? Adjusted    Prognosis: [x]Good   []Fair   []Poor    Patient Requires Follow-up:  [x]Yes  []No    Plan: []Plan of care initiated     [x]Continue per plan of care    [] Alter current plan (see comments)    []Hold pending MD visit []Discharge    Charges:  Timed Code Treatment Minutes: 45   Total Treatment Minutes:  60   3178 [facility] time for each procedure?:  TE TIME:  NMR TIME:  MANUAL TIME:  UNTIMED MINUTES:       [] EVAL (LOW) 65154 (typically 20 minutes face-to-face)  [] EVAL (MOD) 93179 (typically 30 minutes face-to-face)  [] EVAL (HIGH) 69914 (typically 45 minutes face-to-face)  [] RE-EVAL        [x] JT(21298) x2     [] IONTO  [] NMR (33327) x     [x]? VASO  [x]? Manual (87721) x1    [] Other:  [] TA x      [] Mech Traction (75754)  [] ES(attended) (94395)     [] ES (un) (55569):     Medicare Cap total YTD:   [x]N/A    Electronically signed by:  SADI [id number]          Note: If patient does not return for scheduled/ recommended follow up visits, this note will serve as a discharge from care along with most recent update on progress.

## 2021-09-28 ENCOUNTER — HOSPITAL ENCOUNTER (OUTPATIENT)
Dept: PHYSICAL THERAPY | Age: 76
Setting detail: THERAPIES SERIES
Discharge: HOME | End: 2021-09-28
Payer: MEDICARE

## 2021-09-28 PROCEDURE — 97110 THERAPEUTIC EXERCISES: CPT

## 2021-09-28 PROCEDURE — 97140 MANUAL THERAPY 1/> REGIONS: CPT

## 2021-09-28 PROCEDURE — 97016 VASOPNEUMATIC DEVICE THERAPY: CPT

## 2021-09-28 NOTE — FLOWSHEET NOTE
[address] [place] [address] and Sports Rehabilitation, [address]  Phone [phone]  Fax [phone]    Outpatient Physical Therapy     [x] Daily Treatment Note   [] Progress Note  21 [] Discharge Note    Date:  2021    Patient Name:  Agustina Huitron   \"Pat\"       YOB: 1945    Medical Diagnosis: Closed displaced fx of greater tuberosity of R humerus with routine healing (S42.251D)         Treatment Diagnosis: limited ROM, weakness, poor posture, swelling, decreased functional status                          Onset Date:    21  (DOI)               Referral Date:  21                 Referring Physician: Valeda Dubin, MD                                                    Visits Allowed/Insurance/Certification Information: [facility] Medicare, jessi Gilmore, no auth     Restrictions/Precautions: no AROM R shoulder until 8 weeks post-injury (21), PROM, AAROM only, no strengthening    Plan of care sent to provider:   []NA   []Faxed   [x]Co-signature       Plan of care signed:    []NA   [x]Yes 21   []No      Progress report will be due (10 Rx or 30 days whichever is less): 10/19/21    Sees MD again     Recertification will be due (POC Duration  / 90 days whichever is less): 21     Visit# / total visits:     Visit # Insurance Allowable Auth Required   In Person  Jessi Gilmore []  Yes     [x]  No    [facility] 0  []  Yes     []  No    Total        Plan for Next Session:  Continue with AROM and strengthening exercises, add shoulder isometrics, scap retraction with tband      Subjective   Patient reports she forgot to took tylenol this morning for therapy. Reports she still has some kind of skin reaction (some type of poison plant). She can do a fair amount below shoulder ht.    She is able to lift light objects, <5#.           21 Had xrays as follows:  Pertinent radiographs/imagin view right shoulder AP gray she scapular Y and Velpeau view 8/30/2021: Consolidating greater tuberosity fracture with healing callus and bridging present. Small anterior inferior glenoid fracture unchanged. Minimal displacement. Small ossific density in the axillary recess/pouch-unchanged. Per MD note on 8/30/21, \"Physical therapy will continue to progress her with full range of motion and increase ADLs. I do not want them to work on active range of motion ~8-week point. She may come out of her sling at home at this point and do waist level activity. Passive and active assisted range of motion per physical therapy is also acceptable. Otherwise she may sleep in a sling for an additional 2 weeks ~8-week post injury point.  -No lifting greater than 2 to 5 pounds at this point. Again at waist level starting  -OTC Tylenol per bottle as needed discomfort  -Physical therapy will also work on pain modalities\"      Pain level:  Currently 3-4/10, 4-5/10 at worst since last visit. AT EVAL: Patient describes pain to be intermittent in R shoulder in proximal humerus  Patient reports pain is  0/10 pain at present and  4/10 pain at its worst.  Worsened by: Any kind of movement of R arm        Objective:     PROM:  R shoulder flex 150*, abd 130*, ER 65* in 90 deg abd, IR 70 in 90 deg abd       Exercises:   fx 7/19/21  End of 9 wk 9/20/21  Exercises in bold performed in department today.   Items not bolded are carried forward from prior visits for continuity of the record  Exercise/Equipment Resistance/Repetitions Issued for HEP     pulleys x5 min PT present for subjective info     AAROM  Shoulder flex, abd, ER with cane x5 each direction in supine x      strength  Yellow foam x   Supine back pocket shrugs and scap retraction 2x10 of each x   codmans x10 x   Table slides  x10 x   Axial elongation x10 x   Shoulder shrugs 2x10 x   Scapular clock (seated) x10 each (12/6, 3/9) x   Supine ceiling punches 2x10 x   Supine figure 8 2x10 x Supine chest press 2x10 x   sidelying shoulder abd to 90  2x10 x   sidelying shoulder ER with arm at side 2x10 x   rosi add,abd,flex,ext,ir,er x10 ea w/6\" hold x                                                     Therapeutic Exercise/Home Exercise Program:   x33 min. Reviewed and progressed exercises as noted above     Group Therapy:    0 minutes    Therapeutic Activity:  0 minutes     Gait: 0 minutes     Neuromuscular Re-Education:  0 minutes      Canalith Repositioning Procedure:  0 minutes    Manual Therapy:  15 minutes  PROM:  R shoulder in supine, flex, abd, ER, IR, elbow flex/ext, sup/pronation  Gentle inf oscillations for relaxation and pain relief    Modalities: 10 minutes pt needed to leave   [x] GAME READY (VASO)- for significant edema, swelling, pain control, 38 deg, min pressure, with patient seated     Functional Outcome Measure:   []NA  Measure Used: Megan Reyes  Date Assessed: 8/25/21  Score:  70%    Assessment/Treatment/Activity Tolerance:  9/21/21Patient making great progress with PT treatment. ROM progressing appropriately. Started with AROM as indicated per MD note on 8/30/21. NO concerns at this time. Recommend a continuation of PT treatment 2x/week for another 4-8 weeks progressing towards remaining goals. Patients response to treatment:   [x]Patient tolerated treatment well with no adverse reactions noted    []Patient limited by fatigue   []Patient limited by pain    []Patient limited by other medical complications   [x]Other: pain 0/10 after treatment    Goals:   Progress towards goals:  STG's MET. LTG #6 MET. Progressing towards remaining LTG's. GOALS:  Short Term Goals:  2 weeks  1. Independent in HEP and progression per patient tolerance, in order to prevent re-injury. []? Progressing: [x]? Met: []? Not Met: []? Adjusted       2. Patient will have a decrease in pain to facilitate improvement in movement, function, and ADLs as indicated by Functional Deficits.   []? Progressing: [x]? Met: []? Not Met: []? Adjusted          Long Term Goals:  12 weeks  1. Disability index score of 20% or less for the QuickDash to assist with reaching prior level of function. [x]? Progressing: []? Met: []? Not Met: []? Adjusted      2. Patient will demonstrate increased AROM R shoulder = L shoulder to allow for proper joint functioning as indicated by patients Functional Deficits. [x]? Progressing: []? Met: []? Not Met: []? Adjusted       3. Patient will demonstrate an increase in strength to 4/5 or greater in R shoulder and UE to allow for proper functional mobility as indicated by patients Functional Deficits. [x]? Progressing: []? Met: []? Not Met: []? Adjusted       4. Patient will return to all transfers, work activities, and functional activities without increased symptoms or restriction. [x]? Progressing: []? Met: []? Not Met: []? Adjusted       5. Patient will have 0-2/10 pain with ADL's. [x]? Progressing: []? Met: []? Not Met: []? Adjusted       6. Patient stated goal: sleep through the night without waking due to pain  []? Progressing: [x]? Met: []? Not Met: []? Adjusted    Prognosis: [x]Good   []Fair   []Poor    Patient Requires Follow-up:  [x]Yes  []No    Plan: []Plan of care initiated     [x]Continue per plan of care    [] Alter current plan (see comments)    []Hold pending MD visit []Discharge    Charges:  Timed Code Treatment Minutes: 48   Total Treatment Minutes:  60 0693 [facility] time for each procedure?:  TE TIME:  NMR TIME:  MANUAL TIME:  UNTIMED MINUTES:       [] EVAL (LOW) 12724 (typically 20 minutes face-to-face)  [] EVAL (MOD) 54928 (typically 30 minutes face-to-face)  [] EVAL (HIGH) 04180 (typically 45 minutes face-to-face)  [] RE-EVAL        [x] IV(54484) x2     [] IONTO  [] NMR (75664) x     [x]? VASO  [x]? Manual (37724) x1    [] Other:  [] TA x      [] Mech Traction (19998)  [] ES(attended) (79851)     [] ES (un) (61924):     Medicare Cap total YTD: [x]N/A    Electronically signed by:  Annika Torrez, [id number]          Note: If patient does not return for scheduled/ recommended follow up visits, this note will serve as a discharge from care along with most recent update on progress.

## 2021-09-30 ENCOUNTER — HOSPITAL ENCOUNTER (OUTPATIENT)
Dept: PHYSICAL THERAPY | Age: 76
Setting detail: THERAPIES SERIES
Discharge: HOME | End: 2021-09-30
Payer: MEDICARE

## 2021-09-30 PROCEDURE — 97016 VASOPNEUMATIC DEVICE THERAPY: CPT

## 2021-09-30 PROCEDURE — 97110 THERAPEUTIC EXERCISES: CPT

## 2021-09-30 PROCEDURE — 97140 MANUAL THERAPY 1/> REGIONS: CPT

## 2021-09-30 NOTE — FLOWSHEET NOTE
[address] [place] [address] and Sports Rehabilitation, [address]  Phone [phone]  Fax [phone]    Outpatient Physical Therapy     [x] Daily Treatment Note   [] Progress Note  21 [] Discharge Note    Date:  2021    Patient Name:  Estuardo Record   \"Pat\"       YOB: 1945    Medical Diagnosis: Closed displaced fx of greater tuberosity of R humerus with routine healing (S42.251D)         Treatment Diagnosis: limited ROM, weakness, poor posture, swelling, decreased functional status                          Onset Date:    21  (DOI)               Referral Date:  21                 Referring Physician: Marino Banuelos MD                                                    Visits Allowed/Insurance/Certification Information: [facility] Medicare, med Radha Hale no auth     Restrictions/Precautions:     Plan of care sent to provider:   []NA   []Faxed   [x]Co-signature       Plan of care signed:    []NA   [x]Yes 21, 21   []No      Progress report will be due (10 Rx or 30 days whichever is less): 10/19/21    Sees MD again     Recertification will be due (POC Duration  / 90 days whichever is less): 21     Visit# / total visits:     Visit # Insurance Allowable Auth Required   In Person  Med nec []  Yes     [x]  No    Tele Health 0  []  Yes     []  No    Total        Plan for Next Session:  Continue with AROM and strengthening exercises, add bent over row, bicep curls, tricep kickback      Subjective Patient reports having increased soreness from the isometrics. Woke up with pain at 5:00 this morning, took a tylenol, and now her pain is better. Performing HEP 2x/day. Not icing at home. 21 Had xrays as follows:  Pertinent radiographs/imagin view right shoulder AP gray she scapular Y and Velpeau view 2021: Consolidating greater tuberosity fracture with healing callus and bridging present. Small anterior inferior glenoid fracture unchanged. Minimal displacement. Small ossific density in the axillary recess/pouch-unchanged. Per MD note on 8/30/21, \"Physical therapy will continue to progress her with full range of motion and increase ADLs. I do not want them to work on active range of motion ~8-week point. She may come out of her sling at home at this point and do waist level activity. Passive and active assisted range of motion per physical therapy is also acceptable. Otherwise she may sleep in a sling for an additional 2 weeks ~8-week post injury point.  -No lifting greater than 2 to 5 pounds at this point. Again at waist level starting  -OTC Tylenol per bottle as needed discomfort  -Physical therapy will also work on pain modalities\"      Pain level:  Currently 1/10, 5/10 at worst since last visit R shoulder   AT EVAL: Patient describes pain to be intermittent in R shoulder in proximal humerus  Patient reports pain is  0/10 pain at present and  4/10 pain at its worst.  Worsened by: Any kind of movement of R arm        Objective:     PROM:  R shoulder flex 155, abd 155, ER 80 in 90 deg abd, IR 70 in 90 deg abd    fx 7/19/21  End of 10 wk 9/27/21    Exercises:  Exercises in bold performed in department today.   Items not bolded are carried forward from prior visits for continuity of the record  Exercise/Equipment Resistance/Repetitions Issued for HEP     pulleys x5 min PT present for subjective info     AAROM  Shoulder flex, abd, ER with cane x5 each direction in supine x      strength  Yellow foam x   Supine back pocket shrugs and scap retraction 2x10 of each x   codmans x10 x   Table slides  x10 x   Axial elongation x10 x   Shoulder shrugs 2x10 x   Scapular clock (seated) x10 each (12/6, 3/9) x   Supine ceiling punches 2x10, 1# x   Supine figure 8 2x10, 1# x   Supine chest press 2x10, 1# x   sidelying shoulder abd to 90  2x10, 1# x   sidelying shoulder ER with arm at side 2x10, 1# x R shoulder rosi add,abd,flex,ext,ir,er x10 ea w/6\" hold x   Mid rows X20, red tband    Rhythmic stabilization x1 min with light manual resistance    B shoulder flex and abd to 90 deg x10 each direction, focusing on scapular control                                       Therapeutic Exercise/Home Exercise Program:   x33 min. Reviewed and progressed exercises as noted above     Group Therapy:    0 minutes    Therapeutic Activity:  0 minutes     Gait: 0 minutes     Neuromuscular Re-Education:  0 minutes      Canalith Repositioning Procedure:  0 minutes    Manual Therapy:  15 minutes  PROM:  R shoulder in supine, flex, abd, ER, IR, elbow flex/ext, sup/pronation  Gentle inf oscillations for relaxation and pain relief    Modalities: 10 minutes pt needed to leave   [x] GAME READY (VASO)- for significant edema, swelling, pain control, 38 deg, min pressure, with patient seated     Functional Outcome Measure:   []NA  Measure Used: Nicole Ng  Date Assessed: 8/25/21  Score:  70%    Assessment/Treatment/Activity Tolerance:    Patients response to treatment:   [x]Patient tolerated treatment well with no adverse reactions noted    []Patient limited by fatigue   []Patient limited by pain    []Patient limited by other medical complications   [x]Other: pain 0/10 after treatment    Goals:   Progress towards goals:  STG's MET. LTG #6 MET. Progressing towards remaining LTG's. GOALS:  Short Term Goals:  2 weeks  1. Independent in HEP and progression per patient tolerance, in order to prevent re-injury. []? Progressing: [x]? Met: []? Not Met: []? Adjusted       2. Patient will have a decrease in pain to facilitate improvement in movement, function, and ADLs as indicated by Functional Deficits. []? Progressing: [x]? Met: []? Not Met: []? Adjusted          Long Term Goals:  12 weeks  1. Disability index score of 20% or less for the QuickDash to assist with reaching prior level of function. [x]? Progressing: []? Met: []?  Not Met: []? Adjusted      2. Patient will demonstrate increased AROM R shoulder = L shoulder to allow for proper joint functioning as indicated by patients Functional Deficits. [x]? Progressing: []? Met: []? Not Met: []? Adjusted       3. Patient will demonstrate an increase in strength to 4/5 or greater in R shoulder and UE to allow for proper functional mobility as indicated by patients Functional Deficits. [x]? Progressing: []? Met: []? Not Met: []? Adjusted       4. Patient will return to all transfers, work activities, and functional activities without increased symptoms or restriction. [x]? Progressing: []? Met: []? Not Met: []? Adjusted       5. Patient will have 0-2/10 pain with ADL's. [x]? Progressing: []? Met: []? Not Met: []? Adjusted       6. Patient stated goal: sleep through the night without waking due to pain  []? Progressing: [x]? Met: []? Not Met: []? Adjusted    Prognosis: [x]Good   []Fair   []Poor    Patient Requires Follow-up:  [x]Yes  []No    Plan: []Plan of care initiated     [x]Continue per plan of care    [] Alter current plan (see comments)    []Hold pending MD visit []Discharge    Charges:  Timed Code Treatment Minutes: 48   Total Treatment Minutes:  58   2858 [facility] time for each procedure?:  TE TIME:  NMR TIME:  MANUAL TIME:  UNTIMED MINUTES:       [] EVAL (LOW) 40524 (typically 20 minutes face-to-face)  [] EVAL (MOD) 14180 (typically 30 minutes face-to-face)  [] EVAL (HIGH) 26637 (typically 45 minutes face-to-face)  [] RE-EVAL        [x] RW(67517) x2     [] IONTO  [] NMR (75053) x     [x]? VASO  [x]? Manual (75350) x1    [] Other:  [] TA x      [] Mech Traction (83655)  [] ES(attended) (02407)     [] ES (un) (55512): Medicare Cap total YTD:   [x]N/A    Electronically signed by:  Marielle Barber, PT, MPT, OMT-C [id number]            Note: If patient does not return for scheduled/ recommended follow up visits, this note will serve as a discharge from care along with most recent update on progress.

## 2021-10-05 ENCOUNTER — HOSPITAL ENCOUNTER (OUTPATIENT)
Dept: PHYSICAL THERAPY | Age: 76
Setting detail: THERAPIES SERIES
Discharge: HOME | End: 2021-10-05
Payer: MEDICARE

## 2021-10-05 PROCEDURE — 97110 THERAPEUTIC EXERCISES: CPT

## 2021-10-05 PROCEDURE — 97016 VASOPNEUMATIC DEVICE THERAPY: CPT

## 2021-10-05 PROCEDURE — 97140 MANUAL THERAPY 1/> REGIONS: CPT

## 2021-10-05 NOTE — FLOWSHEET NOTE
[facility], [address]  Phone [phone]  Fax [phone]    Outpatient Physical Therapy     [x] Daily Treatment Note   [] Progress Note  21 [] Discharge Note    Date:  10/5/2021    Patient Name:  Aniyah Mustafa   \"Pat\"       YOB: 1945    Medical Diagnosis: Closed displaced fx of greater tuberosity of R humerus with routine healing (S42.251D)         Treatment Diagnosis: limited ROM, weakness, poor posture, swelling, decreased functional status                          Onset Date:    21  (DOI)               Referral Date:  21                 Referring Physician: Royal Telma MD                                                    Visits Allowed/Insurance/Certification Information: [facility] Medicare, med Christina Sample no auth     Restrictions/Precautions:     Plan of care sent to provider:   []NA   []Faxed   [x]Co-signature       Plan of care signed:    []NA   [x]Yes 21, 21   []No      Progress report will be due (10 Rx or 30 days whichever is less): 10/19/21    Sees MD again     Recertification will be due (POC Duration  / 90 days whichever is less): 21     Visit# / total visits:     Visit # Insurance Allowable Auth Required   In Person  Med nec []  Yes     [x]  No    Tele Health 0  []  Yes     []  No    Total        Plan for Next Session:  Continue with AROM and strengthening exercises, add bent over row,  tricep kickback      Subjective   Patient reports she did the wt ex last night for the first time since adding them. Reports she is doing better with isometric ex  Reports she forgot to take tylenol this morning  Reports she drove herself today, about 3 miles  Reports they are moving to [facility] this weekend and for now will want to continue here for therapy  Performing HEP 2x/day.         21 Had xrays as follows:  Pertinent radiographs/imagin view right shoulder AP gray she scapular Y and Velpeau view 8/30/2021: Consolidating greater tuberosity fracture with healing callus and bridging present. Small anterior inferior glenoid fracture unchanged. Minimal displacement. Small ossific density in the axillary recess/pouch-unchanged. Per MD note on 8/30/21, \"Physical therapy will continue to progress her with full range of motion and increase ADLs. I do not want them to work on active range of motion ~8-week point. She may come out of her sling at home at this point and do waist level activity. Passive and active assisted range of motion per physical therapy is also acceptable. Otherwise she may sleep in a sling for an additional 2 weeks ~8-week post injury point.  -No lifting greater than 2 to 5 pounds at this point. Again at waist level starting  -OTC Tylenol per bottle as needed discomfort  -Physical therapy will also work on pain modalities\"      Pain level:  Currently 1/10, 5/10 at worst since last visit R shoulder   AT EVAL: Patient describes pain to be intermittent in R shoulder in proximal humerus  Patient reports pain is  0/10 pain at present and  4/10 pain at its worst.  Worsened by: Any kind of movement of R arm        Objective:     PROM:  R shoulder flex 165, abd 155, ER 70 in 90 deg abd, IR 75 in 90 deg abd    fx 7/19/21  End of 11 wk 10/4/21    Exercises:  Exercises in bold performed in department today.   Items not bolded are carried forward from prior visits for continuity of the record  Exercise/Equipment Resistance/Repetitions Issued for HEP     pulleys x5 min PT present for subjective info     AAROM  Shoulder flex, abd, ER with cane x5 each direction in supine x      strength  Yellow foam x   Supine back pocket shrugs and scap retraction 2x10 of each x   codmans x10 x   Table slides  x10 x   Axial elongation x10 x   Shoulder shrugs 2x10 x   Scapular clock (seated) x10 each (12/6, 3/9) x   Supine ceiling punches 3x10, 1# x   Supine figure 8 3x10, 1# x   Supine chest press 3x10, 1# x   sidelying shoulder abd to 90  2x10, 1# x   sidelying shoulder ER with arm at side 2x10, 1# x    R shoulder rosi add,abd,flex,ext,ir,er x10 ea w/6\" hold x   Mid rows X20, red tband    Rhythmic stabilization x1 min with light manual resistance    B shoulder flex and abd to 90 deg x10 each direction, focusing on scapular control    bicep curls 1# 2x10                                  Therapeutic Exercise/Home Exercise Program:   x35 min. Reviewed and progressed exercises as noted above     Group Therapy:    0 minutes    Therapeutic Activity:  0 minutes     Gait: 0 minutes     Neuromuscular Re-Education:  0 minutes      Canalith Repositioning Procedure:  0 minutes    Manual Therapy:  15 minutes  PROM:  R shoulder in supine, flex, abd, ER, IR, elbow flex/ext, sup/pronation  Gentle inf oscillations for relaxation and pain relief    Modalities: 15 minutes pt needed to leave   [x] GAME READY (VASO)- for significant edema, swelling, pain control, 38 deg, min pressure, with patient seated     Functional Outcome Measure:   []NA  Measure Used: [facility]  Date Assessed: 8/25/21  Score:  70%    Assessment/Treatment/Activity Tolerance:    Patients response to treatment:   [x]Patient tolerated treatment well with no adverse reactions noted    []Patient limited by fatigue   []Patient limited by pain    []Patient limited by other medical complications   [x]Other: pain 0/10 after treatment    Goals:   Progress towards goals:  STG's MET. LTG #6 MET. Progressing towards remaining LTG's. GOALS:  Short Term Goals:  2 weeks  1. Independent in HEP and progression per patient tolerance, in order to prevent re-injury. []? Progressing: [x]? Met: []? Not Met: []? Adjusted       2. Patient will have a decrease in pain to facilitate improvement in movement, function, and ADLs as indicated by Functional Deficits. []? Progressing: [x]? Met: []? Not Met: []?  Adjusted          Long Term Goals:  12 weeks  1. Disability index score of 20% or less for the QuickDash to assist with reaching prior level of function. [x]? Progressing: []? Met: []? Not Met: []? Adjusted      2. Patient will demonstrate increased AROM R shoulder = L shoulder to allow for proper joint functioning as indicated by patients Functional Deficits. [x]? Progressing: []? Met: []? Not Met: []? Adjusted       3. Patient will demonstrate an increase in strength to 4/5 or greater in R shoulder and UE to allow for proper functional mobility as indicated by patients Functional Deficits. [x]? Progressing: []? Met: []? Not Met: []? Adjusted       4. Patient will return to all transfers, work activities, and functional activities without increased symptoms or restriction. [x]? Progressing: []? Met: []? Not Met: []? Adjusted       5. Patient will have 0-2/10 pain with ADL's. [x]? Progressing: []? Met: []? Not Met: []? Adjusted       6. Patient stated goal: sleep through the night without waking due to pain  []? Progressing: [x]? Met: []? Not Met: []? Adjusted    Prognosis: [x]Good   []Fair   []Poor    Patient Requires Follow-up:  [x]Yes  []No    Plan: []Plan of care initiated     [x]Continue per plan of care    [] Alter current plan (see comments)    []Hold pending MD visit []Discharge    Charges:  Timed Code Treatment Minutes: 50   Total Treatment Minutes:  22 3908 [facility] time for each procedure?:  TE TIME:  NMR TIME:  MANUAL TIME:  UNTIMED MINUTES:       [] EVAL (LOW) 48658 (typically 20 minutes face-to-face)  [] EVAL (MOD) 25740 (typically 30 minutes face-to-face)  [] EVAL (HIGH) 13840 (typically 45 minutes face-to-face)  [] RE-EVAL        [x] ER(55626) x2     [] IONTO  [] NMR (35340) x     [x]? VASO  [x]? Manual (48969) x1    [] Other:  [] TA x      [] Mech Traction (87704)  [] ES(attended) (33709)     [] ES (un) (24195):     Medicare Cap total YTD:   [x]N/A    Electronically signed by:  Lyle Tobar, [id number]            Note: If

## 2021-10-07 ENCOUNTER — HOSPITAL ENCOUNTER (OUTPATIENT)
Dept: PHYSICAL THERAPY | Age: 76
Setting detail: THERAPIES SERIES
Discharge: HOME | End: 2021-10-07
Payer: MEDICARE

## 2021-10-07 PROCEDURE — 97140 MANUAL THERAPY 1/> REGIONS: CPT

## 2021-10-07 PROCEDURE — 97016 VASOPNEUMATIC DEVICE THERAPY: CPT

## 2021-10-07 PROCEDURE — 97110 THERAPEUTIC EXERCISES: CPT

## 2021-10-07 NOTE — FLOWSHEET NOTE
[address] [facility], [address]  Phone [phone]  Fax [phone]    Outpatient Physical Therapy     [x] Daily Treatment Note   [] Progress Note  9/21/21 [] Discharge Note    Date:  10/7/2021    Patient Name:  Marjorie Laboy   \"Pat\"       YOB: 1945    Medical Diagnosis: Closed displaced fx of greater tuberosity of R humerus with routine healing (S42.251D)         Treatment Diagnosis: limited ROM, weakness, poor posture, swelling, decreased functional status                          Onset Date:    7/19/21  (DOI)               Referral Date:  8/9/21                 Referring Physician: Lydia Woo MD                                                    Visits Allowed/Insurance/Certification Information: [facility] Medicare, med Paolo Draper, no auth     Restrictions/Precautions:     Plan of care sent to provider:   []NA   []Faxed   [x]Co-signature       Plan of care signed:    []NA   [x]Yes 8/25/21, 9/22/21   []No      Progress report will be due (10 Rx or 30 days whichever is less): 10/19/21    Sees MD again 18/42/18    Recertification will be due (POC Duration  / 90 days whichever is less): 11/25/21     Visit# / total visits:     Visit # Insurance Allowable Auth Required   In Person 14/24 Med nec []  Yes     [x]  No    Tele Health 0  []  Yes     []  No    Total 14/24       Plan for Next Session:  Continue with AROM and strengthening exercises, add shoulder tband ex,  tricep kickback      Subjective  Patient had been using a stepstool to get up into her truck, but the past few days she has not been using the stepstool, and has used the truck handles which has made her R shoulder more painful/sore lately. Since then her R shoulder pain has been greater with lifting and reaching. She is still sleeping fine without reports of pain in her bed. Took tylenol this morning to help with pain. Performing HEP 1-2 times  daily. 21 Had xrays as follows:  Pertinent radiographs/imagin view right shoulder AP gray she scapular Y and Velpeau view 2021: Consolidating greater tuberosity fracture with healing callus and bridging present. Small anterior inferior glenoid fracture unchanged. Minimal displacement. Small ossific density in the axillary recess/pouch-unchanged. Per MD note on 21, \"Physical therapy will continue to progress her with full range of motion and increase ADLs. I do not want them to work on active range of motion ~8-week point. She may come out of her sling at home at this point and do waist level activity. Passive and active assisted range of motion per physical therapy is also acceptable. Otherwise she may sleep in a sling for an additional 2 weeks ~8-week post injury point.  -No lifting greater than 2 to 5 pounds at this point. Again at waist level starting  -OTC Tylenol per bottle as needed discomfort  -Physical therapy will also work on pain modalities\"      Pain level:  Currently 1/10, 5/10 at worst since last visit R shoulder   AT EVAL: Patient describes pain to be intermittent in R shoulder in proximal humerus  Patient reports pain is  0/10 pain at present and  4/10 pain at its worst.  Worsened by: Any kind of movement of R arm        Objective:     PROM:  R shoulder flex 170, abd 180, ER 85 in 90 deg abd, IR 75 in 90 deg abd    fx 21  End of 11 wk 10/4/21    Exercises:  Exercises in bold performed in department today.   Items not bolded are carried forward from prior visits for continuity of the record  Exercise/Equipment Resistance/Repetitions Issued for HEP     pulleys x5 min PT present for subjective info     AAROM  Shoulder flex, abd, ER with cane x5 each direction in supine x      strength  Yellow foam x   Supine back pocket shrugs and scap retraction 2x10 of each x   codmans x10 x   Table slides  x10 x   Axial elongation x10 x   Shoulder shrugs 2x10 x   Scapular clock improvement in movement, function, and ADLs as indicated by Functional Deficits. []? Progressing: [x]? Met: []? Not Met: []? Adjusted          Long Term Goals:  12 weeks  1. Disability index score of 20% or less for the QuickDash to assist with reaching prior level of function. [x]? Progressing: []? Met: []? Not Met: []? Adjusted      2. Patient will demonstrate increased AROM R shoulder = L shoulder to allow for proper joint functioning as indicated by patients Functional Deficits. [x]? Progressing: []? Met: []? Not Met: []? Adjusted       3. Patient will demonstrate an increase in strength to 4/5 or greater in R shoulder and UE to allow for proper functional mobility as indicated by patients Functional Deficits. [x]? Progressing: []? Met: []? Not Met: []? Adjusted       4. Patient will return to all transfers, work activities, and functional activities without increased symptoms or restriction. [x]? Progressing: []? Met: []? Not Met: []? Adjusted       5. Patient will have 0-2/10 pain with ADL's. [x]? Progressing: []? Met: []? Not Met: []? Adjusted       6. Patient stated goal: sleep through the night without waking due to pain  []? Progressing: [x]? Met: []? Not Met: []? Adjusted    Prognosis: [x]Good   []Fair   []Poor    Patient Requires Follow-up:  [x]Yes  []No    Plan: []Plan of care initiated     [x]Continue per plan of care    [] Alter current plan (see comments)    []Hold pending MD visit []Discharge    Charges:  Timed Code Treatment Minutes: 45   Total Treatment Minutes:  60   4[facility] time for each procedure?:  TE TIME:  NMR TIME:  MANUAL TIME:  UNTIMED MINUTES:       [] EVAL (LOW) 68529 (typically 20 minutes face-to-face)  [] EVAL (MOD) 21032 (typically 30 minutes face-to-face)  [] EVAL (HIGH) 78940 (typically 45 minutes face-to-face)  [] RE-EVAL        [x] RT(07512) x2     [] IONTO  [] NMR (08678) x     [x]? VASO  [x]?  Manual (40678) x1    [] Other:  [] TA x      [] [facility] Traction (85486)  [] ES(attended) (87442)     [] ES (un) (34414): Medicare Cap total YTD:   [x]N/A    Electronically signed by:  Radha Little, PT, MPT, OMT-C [id number]              Note: If patient does not return for scheduled/ recommended follow up visits, this note will serve as a discharge from care along with most recent update on progress.

## 2021-10-12 ENCOUNTER — HOSPITAL ENCOUNTER (OUTPATIENT)
Dept: PHYSICAL THERAPY | Age: 76
Setting detail: THERAPIES SERIES
Discharge: HOME | End: 2021-10-12
Payer: MEDICARE

## 2021-10-12 PROCEDURE — 97016 VASOPNEUMATIC DEVICE THERAPY: CPT

## 2021-10-12 PROCEDURE — 97140 MANUAL THERAPY 1/> REGIONS: CPT

## 2021-10-12 PROCEDURE — 97110 THERAPEUTIC EXERCISES: CPT

## 2021-10-12 NOTE — FLOWSHEET NOTE
[address] [place] [address] [facility], [address]  Phone [phone]  Fax [phone]    Outpatient Physical Therapy     [x] Daily Treatment Note   [] Progress Note  21 [] Discharge Note    Date:  10/12/2021    Patient Name:  Guicho Velasquez   \"Mariam\"       YOB: 1945    Medical Diagnosis: Closed displaced fx of greater tuberosity of R humerus with routine healing (S42.251D)         Treatment Diagnosis: limited ROM, weakness, poor posture, swelling, decreased functional status                          Onset Date:    21  (DOI)               Referral Date:  21                 Referring Physician: Galdino Rao MD                                                    Visits Allowed/Insurance/Certification Information: [facility] Medicare, jessi Limon no auth     Restrictions/Precautions:     Plan of care sent to provider:   []NA   []Faxed   [x]Co-signature       Plan of care signed:    []NA   [x]Yes 21, 21   []No      Progress report will be due (10 Rx or 30 days whichever is less): 10/19/21    Sees MD again 84/95/77    Recertification will be due (POC Duration  / 90 days whichever is less): 21     Visit# / total visits:     Visit # Insurance Allowable Auth Required   In Person 15/24 Med nec []  Yes     [x]  No    Tele Health 0  []  Yes     []  No    Total 15/24       Plan for Next Session:  Continue with AROM and strengthening exercises, add shoulder tband ex      Subjective  Patient reports her rash is doing much better, took her last steroid this morning. Took tylenol this morning to help with pain. Performing HEP 1-2 times  daily. 21 Had xrays as follows:  Pertinent radiographs/imagin view right shoulder AP gray she scapular Y and Velpeau view 2021: Consolidating greater tuberosity fracture with healing callus and bridging present. Small anterior inferior glenoid fracture unchanged. Minimal displacement. Small ossific density in the axillary recess/pouch-unchanged. Per MD note on 8/30/21, \"Physical therapy will continue to progress her with full range of motion and increase ADLs. I do not want them to work on active range of motion ~8-week point. She may come out of her sling at home at this point and do waist level activity. Passive and active assisted range of motion per physical therapy is also acceptable. Otherwise she may sleep in a sling for an additional 2 weeks ~8-week post injury point.  -No lifting greater than 2 to 5 pounds at this point. Again at waist level starting  -OTC Tylenol per bottle as needed discomfort  -Physical therapy will also work on pain modalities\"      Pain level:  Currently 1/10, 5/10 at worst since last visit R shoulder   AT EVAL: Patient describes pain to be intermittent in R shoulder in proximal humerus  Patient reports pain is  0/10 pain at present and  4/10 pain at its worst.  Worsened by: Any kind of movement of R arm        Objective:     PROM:  R shoulder flex 170, abd 180, ER 85 in 90 deg abd, IR 75 in 90 deg abd    fx 7/19/21  End of 11 wk 10/4/21    Exercises:  Exercises in bold performed in department today.   Items not bolded are carried forward from prior visits for continuity of the record  Exercise/Equipment Resistance/Repetitions Issued for HEP     pulleys x5 min PT present for subjective info     AAROM  Shoulder flex, abd, ER with cane x5 each direction in supine x      strength  Yellow foam x   Supine back pocket shrugs and scap retraction 2x10 of each x   codmans x10 x   Table slides  x10 x   Axial elongation x10 x   Shoulder shrugs 2x10 x   Scapular clock (seated) x10 each (12/6, 3/9) x   Supine ceiling punches 3x10, 2# x   Supine figure 8 3x10, 2# x   Supine chest press 3x10, 2# x   sidelying shoulder abd to 90  2x10, 2# x   sidelying shoulder ER with arm at side 2x10, 2# x    R shoulder rosi add,abd,flex,ext,ir,er x10 ea w/6\" hold x   Mid rows X20, red tband x   Rhythmic stabilization x1 min with light manual resistance x   B shoulder flex and abd to 90 deg x10 each direction, focusing on scapular control, 1# x   bicep curls 2# 2x10 x   PNF D2 2x10 in supine x   Bent over row 2x10 2# x   Bent over long arm ext 2x10 1# x   Bent over tricep kickback 2x10 1# x                                Therapeutic Exercise/Home Exercise Program:   x33 min. Reviewed and progressed exercises as noted above     Group Therapy:    0 minutes    Therapeutic Activity:  0 minutes     Gait: 0 minutes     Neuromuscular Re-Education:  0 minutes      Canalith Repositioning Procedure:  0 minutes    Manual Therapy:  15 minutes  PROM:  R shoulder in supine, flex, abd, ER, IR, elbow flex/ext, sup/pronation  Gentle inf oscillations for relaxation and pain relief    Modalities: 15 minutes pt needed to leave   [x] GAME READY (VASO)- for significant edema, swelling, pain control, 38 deg, min pressure, with patient seated     Functional Outcome Measure:   []NA  [facility]  Date Assessed: 8/25/21  Score:  70%    Assessment/Treatment/Activity Tolerance:    Patients response to treatment:   [x]Patient tolerated treatment well with no adverse reactions noted    []Patient limited by fatigue   []Patient limited by pain    []Patient limited by other medical complications   [x]Other: pain 0/10 after treatment    Goals:   Progress towards goals:  STG's MET. LTG #6 MET. Progressing towards remaining LTG's. GOALS:  Short Term Goals:  2 weeks  1. Independent in HEP and progression per patient tolerance, in order to prevent re-injury. []? Progressing: [x]? Met: []? Not Met: []? Adjusted       2. Patient will have a decrease in pain to facilitate improvement in movement, function, and ADLs as indicated by Functional Deficits. []? Progressing: [x]? Met: []? Not Met: []? Adjusted          Long Term Goals:  12 weeks  1.  Disability index score of 20% or less for the QuickDash to assist with reaching prior level of function. [x]? Progressing: []? Met: []? Not Met: []? Adjusted      2. Patient will demonstrate increased AROM R shoulder = L shoulder to allow for proper joint functioning as indicated by patients Functional Deficits. [x]? Progressing: []? Met: []? Not Met: []? Adjusted       3. Patient will demonstrate an increase in strength to 4/5 or greater in R shoulder and UE to allow for proper functional mobility as indicated by patients Functional Deficits. [x]? Progressing: []? Met: []? Not Met: []? Adjusted       4. Patient will return to all transfers, work activities, and functional activities without increased symptoms or restriction. [x]? Progressing: []? Met: []? Not Met: []? Adjusted       5. Patient will have 0-2/10 pain with ADL's. [x]? Progressing: []? Met: []? Not Met: []? Adjusted       6. Patient stated goal: sleep through the night without waking due to pain  []? Progressing: [x]? Met: []? Not Met: []? Adjusted    Prognosis: [x]Good   []Fair   []Poor    Patient Requires Follow-up:  [x]Yes  []No    Plan: []Plan of care initiated     [x]Continue per plan of care    [] Alter current plan (see comments)    []Hold pending MD visit []Discharge    Charges:  Timed Code Treatment Minutes: 48   Total Treatment Minutes:  08 6458 [facility] time for each procedure?:  TE TIME:  NMR TIME:  MANUAL TIME:  UNTIMED MINUTES:       [] EVAL (LOW) 81180 (typically 20 minutes face-to-face)  [] EVAL (MOD) 65263 (typically 30 minutes face-to-face)  [] EVAL (HIGH) 08399 (typically 45 minutes face-to-face)  [] RE-EVAL        [x] GM(46970) x2     [] IONTO  [] NMR (99029) x     [x]? VASO  [x]? Manual (58971) x1    [] Other:  [] TA x      [] Mech Traction (84916)  [] ES(attended) (92899)     [] ES (un) (94125):     Medicare Cap total YTD:   [x]N/A    Electronically signed by:  Anderson Caldwell [id number]              Note: If patient does not return for scheduled/ recommended follow up visits, this note will serve as a discharge from care along with most recent update on progress.

## 2021-10-14 ENCOUNTER — HOSPITAL ENCOUNTER (OUTPATIENT)
Dept: PHYSICAL THERAPY | Age: 76
Setting detail: THERAPIES SERIES
Discharge: HOME | End: 2021-10-14
Payer: MEDICARE

## 2021-10-14 PROCEDURE — 97110 THERAPEUTIC EXERCISES: CPT

## 2021-10-14 PROCEDURE — 97016 VASOPNEUMATIC DEVICE THERAPY: CPT

## 2021-10-14 PROCEDURE — 97140 MANUAL THERAPY 1/> REGIONS: CPT

## 2021-10-14 NOTE — FLOWSHEET NOTE
[address] [place] [address] [facility] [address]LE [address]  Phone [phone]  Fax [phone]    Outpatient Physical Therapy     [x] Daily Treatment Note   [x] Progress Note  21,10/14/21 [] Discharge Note    Date:  10/14/2021    Patient Name:  Theodora Negrete   \"Pat\"       YOB: 1945    Medical Diagnosis: Closed displaced fx of greater tuberosity of R humerus with routine healing (S42.251D)         Treatment Diagnosis: limited ROM, weakness, poor posture, swelling, decreased functional status                          Onset Date:    21  (DOI)               Referral Date:  21                 Referring Physician: Vanna Leal MD                                                    Visits Allowed/Insurance/Certification Information: [facility] Medicare, med Maudine Anon, no auth     Restrictions/Precautions:     Plan of care sent to provider:   []NA   []Faxed   [x]Co-signature       Plan of care signed:    []NA   [x]Yes 21, 21   []No      Progress report will be due (10 Rx or 30 days whichever is less): 10/19/21    Sees MD again     Recertification will be due (POC Duration  / 90 days whichever is less): 21     Visit# / total visits:     Visit # Insurance Allowable Auth Required   In Person  Med nec []  Yes     [x]  No    Tele Health 0  []  Yes     []  No    Total        Plan for Next Session:  Continue with AROM and strengthening exercises, add shoulder tband ex      Subjective  Patient reports she did not do ex yesterday because they were really busy and she was sore, reports being sore (not sure if from ex or more activity) and Took tylenol this morning to help with pain. Also has finished steroids. Performing HEP 1-2 times  daily.           21 Had xrays as follows:  Pertinent radiographs/imagin view right shoulder AP gray she scapular Y and Velpeau view 2021: Consolidating greater tuberosity fracture with healing callus and bridging present. Small anterior inferior glenoid fracture unchanged. Minimal displacement. Small ossific density in the axillary recess/pouch-unchanged. Per MD note on 8/30/21, \"Physical therapy will continue to progress her with full range of motion and increase ADLs. I do not want them to work on active range of motion ~8-week point. She may come out of her sling at home at this point and do waist level activity. Passive and active assisted range of motion per physical therapy is also acceptable. Otherwise she may sleep in a sling for an additional 2 weeks ~8-week post injury point.  -No lifting greater than 2 to 5 pounds at this point. Again at waist level starting  -OTC Tylenol per bottle as needed discomfort  -Physical therapy will also work on pain modalities\"      Pain level:  Currently 4/10, 5/10 at worst since last visit R shoulder   AT EVAL: Patient describes pain to be intermittent in R shoulder in proximal humerus  Patient reports pain is  0/10 pain at present and  4/10 pain at its worst.  Worsened by: Any kind of movement of R arm        Objective:     PROM:  R shoulder flex 170, abd 180, ER 85 in 90 deg abd, IR 75 in 90 deg abd    over site today fx 7/19/21  End of 12 wk 10/11/21    Exercises:  Exercises in bold performed in department today.   Items not bolded are carried forward from prior visits for continuity of the record  Exercise/Equipment Resistance/Repetitions Issued for HEP     pulleys x5 min PT present for subjective info     AAROM  Shoulder flex, abd, ER with cane x5 each direction in supine x      strength  Yellow foam x   Supine back pocket shrugs and scap retraction 2x10 of each x   codmans x10 x   Table slides  x10 x   Axial elongation x10 x   Shoulder shrugs 2x10 x   Scapular clock (seated) x10 each (12/6, 3/9) x   Supine ceiling punches 3x10, 2# x   Supine figure 8 3x10, 2# x   Supine chest press 3x10, 2# x   sidelying shoulder abd to 90  2x10, 2# x   sidelying shoulder ER with arm at side 2x10, 2# x    R shoulder rosi add,abd,flex,ext,ir,er x10 ea w/6\" hold x   Mid rows X20, red tband x   Rhythmic stabilization x1 min with light manual resistance x   B shoulder flex and abd to 90 deg x10 each direction, focusing on scapular control, 1# x   bicep curls 2# 2x10 x   PNF D2 2x10 in supine x   Bent over row 2x10 2# x   Bent over long arm ext 2x10 1# x   Bent over tricep kickback 2x10 1# x                                Therapeutic Exercise/Home Exercise Program:   x32 min. Reviewed and progressed exercises as noted above     Group Therapy:    0 minutes    Therapeutic Activity:  0 minutes     Gait: 0 minutes     Neuromuscular Re-Education:  0 minutes      Canalith Repositioning Procedure:  0 minutes    Manual Therapy:  23 minutes  Took a bit longer to get rom today  PROM:  R shoulder in supine, flex, abd, ER, IR, elbow flex/ext, sup/pronation  Gentle inf oscillations for relaxation and pain relief    Modalities: 15 minutes pt needed to leave   [x] GAME READY (VASO)- for significant edema, swelling, pain control, 38 deg, min pressure, with patient seated     Functional Outcome Measure:   []NA  Measure Used: [place]  Will do nv  Date Assessed: 8/25/21  Score:  70%    Assessment/Treatment/Activity Tolerance:  Patient making great progress with PT treatment. PROM is WNL. Still limited with AROM and strength of R shoulder. Recommend a continuation of PT treatment 2x/week for another 4-8 weeks progressing towards remaining goals to allow patient to return to her prior level of function. Patients response to treatment:   [x]Patient tolerated treatment well with no adverse reactions noted    []Patient limited by fatigue   []Patient limited by pain    []Patient limited by other medical complications   [x]Other: pain 0/10 after treatment    Goals:   Progress towards goals:  STG's MET. LTG #6 MET. Progressing towards remaining LTG's. GOALS:  Short Term Goals:  2 weeks  1. Independent in HEP and progression per patient tolerance, in order to prevent re-injury. []? Progressing: [x]? Met: []? Not Met: []? Adjusted       2. Patient will have a decrease in pain to facilitate improvement in movement, function, and ADLs as indicated by Functional Deficits. []? Progressing: [x]? Met: []? Not Met: []? Adjusted          Long Term Goals:  12 weeks  1. Disability index score of 20% or less for the QuickDash to assist with reaching prior level of function. [x]? Progressing: []? Met: []? Not Met: []? Adjusted      2. Patient will demonstrate increased AROM R shoulder = L shoulder to allow for proper joint functioning as indicated by patients Functional Deficits. [x]? Progressing: []? Met: []? Not Met: []? Adjusted       3. Patient will demonstrate an increase in strength to 4/5 or greater in R shoulder and UE to allow for proper functional mobility as indicated by patients Functional Deficits. [x]? Progressing: []? Met: []? Not Met: []? Adjusted       4. Patient will return to all transfers, work activities, and functional activities without increased symptoms or restriction. [x]? Progressing: []? Met: []? Not Met: []? Adjusted       5. Patient will have 0-2/10 pain with ADL's. [x]? Progressing: []? Met: []? Not Met: []? Adjusted       6. Patient stated goal: sleep through the night without waking due to pain  []? Progressing: [x]? Met: []? Not Met: []?  Adjusted    Prognosis: [x]Good   []Fair   []Poor    Patient Requires Follow-up:  [x]Yes  []No    Plan: []Plan of care initiated     [x]Continue per plan of care    [] Alter current plan (see comments)    []Hold pending MD visit []Discharge    Charges:  Timed Code Treatment Minutes: 55   Total Treatment Minutes:  70   [facility] time for each procedure?:  TE TIME:  NMR TIME:  MANUAL TIME:  UNTIMED MINUTES:       [] EVAL (LOW) 57771 (typically 20 minutes face-to-face)  [] EVAL (MOD) 92320 (typically 30

## 2021-10-18 ENCOUNTER — OFFICE VISIT (OUTPATIENT)
Dept: ORTHOPEDIC SURGERY | Age: 76
End: 2021-10-18
Payer: MEDICARE

## 2021-10-18 VITALS — HEIGHT: 61 IN | WEIGHT: 223 LBS | BODY MASS INDEX: 42.1 KG/M2

## 2021-10-18 DIAGNOSIS — S42.251D CLOSED DISPLACED FRACTURE OF GREATER TUBEROSITY OF RIGHT HUMERUS WITH ROUTINE HEALING: Primary | ICD-10-CM

## 2021-10-18 PROCEDURE — 1090F PRES/ABSN URINE INCON ASSESS: CPT | Performed by: ORTHOPAEDIC SURGERY

## 2021-10-18 PROCEDURE — 4040F PNEUMOC VAC/ADMIN/RCVD: CPT | Performed by: ORTHOPAEDIC SURGERY

## 2021-10-18 PROCEDURE — G8484 FLU IMMUNIZE NO ADMIN: HCPCS | Performed by: ORTHOPAEDIC SURGERY

## 2021-10-18 PROCEDURE — G8417 CALC BMI ABV UP PARAM F/U: HCPCS | Performed by: ORTHOPAEDIC SURGERY

## 2021-10-18 PROCEDURE — G8400 PT W/DXA NO RESULTS DOC: HCPCS | Performed by: ORTHOPAEDIC SURGERY

## 2021-10-18 PROCEDURE — 99213 OFFICE O/P EST LOW 20 MIN: CPT | Performed by: ORTHOPAEDIC SURGERY

## 2021-10-18 PROCEDURE — G8427 DOCREV CUR MEDS BY ELIG CLIN: HCPCS | Performed by: ORTHOPAEDIC SURGERY

## 2021-10-18 PROCEDURE — 1123F ACP DISCUSS/DSCN MKR DOCD: CPT | Performed by: ORTHOPAEDIC SURGERY

## 2021-10-18 PROCEDURE — 1036F TOBACCO NON-USER: CPT | Performed by: ORTHOPAEDIC SURGERY

## 2021-10-18 NOTE — PROGRESS NOTES
FOLLOW UP ORTHOPAEDIC NOTE    The patient follows up today for reevaluation of right shoulder injury. The patient states she has been going to physical therapy. She is pleased with results thus far. She states occasional 3/10 pain. PE:  AAOx3  RR  Unlabored breathing  Skin warm and moist  Focused physical examination of the right shoulder  170 degrees forward flexion abduction, 50 degrees external rotation, internal rotation L5  5/5 supraspinatus infraspinatus and subscapularis testing. Otherwise remainder of neurovascular exam unchanged     Diagnosis Orders   1. Closed displaced fracture of greater tuberosity of right humerus with routine healing         Assessment and plan: 68 female with continued subjective symptoms of right shoulder pain with known, correlating diagnosis of closed displaced right shoulder greater tuberosity fracture status post right shoulder fracture dislocation.  -Time of 12 minutes was spent coordinating and discussing the clinical findings and diagnostic imaging results as they pertain to the patient's presenting subjective symptoms.  -I had a pleasant discussion with the patient today. I reviewed with her that currently her clinical examination is very well-appearing.  -She may continue to go to formal physical therapy but ultimately they can provide a home exercise program for her to transition to at this point.  -Continue OTC Tylenol per bottle as needed discomfort  -Continue activity modification as needed to include low impact  -Otherwise she is cleared without restriction at this time and she is doing quite well. Continue home exercise program to tolerance  -All questions answered to the patient's satisfaction and the patient expressed understanding and agreement with the above listed treatment plan  -Follow up as needed  -Thank you for the clinical consultation and allowing me to participate in the patient's care.       Electronically signed by Gardenia Umana MD on 10/18/21 at 9:26 AM MIA Flores MD       Orthopaedic Surgery-Sports Medicine    Disclaimer: This note was dictated with voice recognition software. Though review and correction are routinely performed, please contact the office/medical records for any errors requiring correction.

## 2021-10-19 ENCOUNTER — HOSPITAL ENCOUNTER (OUTPATIENT)
Dept: PHYSICAL THERAPY | Age: 76
Setting detail: THERAPIES SERIES
Discharge: HOME | End: 2021-10-19
Payer: MEDICARE

## 2021-10-19 PROCEDURE — 97016 VASOPNEUMATIC DEVICE THERAPY: CPT

## 2021-10-19 PROCEDURE — 97110 THERAPEUTIC EXERCISES: CPT

## 2021-10-19 PROCEDURE — 97140 MANUAL THERAPY 1/> REGIONS: CPT

## 2021-10-19 NOTE — FLOWSHEET NOTE
21 Had xrays as follows:  Pertinent radiographs/imagin view right shoulder AP gray she scapular Y and Velpeau view 2021: Consolidating greater tuberosity fracture with healing callus and bridging present. Small anterior inferior glenoid fracture unchanged. Minimal displacement. Small ossific density in the axillary recess/pouch-unchanged. Per MD note on 21, \"Physical therapy will continue to progress her with full range of motion and increase ADLs. I do not want them to work on active range of motion ~8-week point. She may come out of her sling at home at this point and do waist level activity. Passive and active assisted range of motion per physical therapy is also acceptable. Otherwise she may sleep in a sling for an additional 2 weeks ~8-week post injury point.  -No lifting greater than 2 to 5 pounds at this point. Again at waist level starting  -OTC Tylenol per bottle as needed discomfort  -Physical therapy will also work on pain modalities\"      Pain level:  Currently 3/10, 5/10 at worst since last visit R shoulder   AT EVAL: Patient describes pain to be intermittent in R shoulder in proximal humerus  Patient reports pain is  0/10 pain at present and  4/10 pain at its worst.  Worsened by: Any kind of movement of R arm        Objective:     PROM:  R shoulder flex 170, abd 180, ER 80 in 90 deg abd, IR 80 in 90 deg abd    over site today fx 21  End of 13 wk 10/18/21    Exercises:  Exercises in bold performed in department today.   Items not bolded are carried forward from prior visits for continuity of the record  Exercise/Equipment Resistance/Repetitions Issued for HEP     pulleys x5 min PT present for subjective info     AAROM  Shoulder flex, abd, ER with cane x5 each direction in supine x      strength  Yellow foam x   Supine back pocket shrugs and scap retraction 2x10 of each x   codmans x10 x   Table slides  x10 x   Axial elongation x10 x   Shoulder shrugs 2x10 x Scapular clock (seated) x10 each (12/6, 3/9) x   Supine ceiling punches 3x10, 2# x   Supine figure 8 3x10, 2# x   Supine chest press 3x10, 2# x   sidelying shoulder abd to 90  2x10, 2# x   sidelying shoulder ER with arm at side 2x10, 2# x    R shoulder tband add,abd,flex,ext,ir,er x10 ea yellow tband x   Mid rows X20, red tband x   Rhythmic stabilization x1 min with light manual resistance x   B shoulder flex and abd to 90 deg x10 each direction, focusing on scapular control, 1# x   bicep curls 2# 2x10 x   PNF D2 2x10 in supine with manual resistance  Standing with yellow tband x   Bent over row 2x10 2# x   Bent over long arm ext 2x10 1# x   Bent over tricep kickback 2x10 1# x                                Therapeutic Exercise/Home Exercise Program:   x30 min. Reviewed and progressed exercises as noted above with yellow tband and new handouts given. Group Therapy:    0 minutes    Therapeutic Activity:  0 minutes     Gait: 0 minutes     Neuromuscular Re-Education:  0 minutes      Canalith Repositioning Procedure:  0 minutes    Manual Therapy:  15 minutes    PROM:  R shoulder in supine, flex, abd, ER, IR, elbow flex/ext, sup/pronation  Gentle inf oscillations for relaxation and pain relief    Modalities: 15 minutes   [x] GAME READY (VASO)- for significant edema, swelling, pain control, 38 deg, min pressure, with patient seated     Functional Outcome Measure:   []NA  Measure Used: [place] Glez  Will do nv  Date Assessed: 8/25/21  Score:  70%    Assessment/Treatment/Activity Tolerance:    Patients response to treatment:   [x]Patient tolerated treatment well with no adverse reactions noted    []Patient limited by fatigue   []Patient limited by pain    []Patient limited by other medical complications   [x]Other: pain 0/10 after treatment    Goals:   Progress towards goals:  STG's MET. LTG #6 MET. Progressing towards remaining LTG's. GOALS:  Short Term Goals:  2 weeks  1.  Independent in HEP and progression per patient tolerance, in order to prevent re-injury. []? Progressing: [x]? Met: []? Not Met: []? Adjusted       2. Patient will have a decrease in pain to facilitate improvement in movement, function, and ADLs as indicated by Functional Deficits. []? Progressing: [x]? Met: []? Not Met: []? Adjusted          Long Term Goals:  12 weeks  1. Disability index score of 20% or less for the QuickDash to assist with reaching prior level of function. [x]? Progressing: []? Met: []? Not Met: []? Adjusted      2. Patient will demonstrate increased AROM R shoulder = L shoulder to allow for proper joint functioning as indicated by patients Functional Deficits. [x]? Progressing: []? Met: []? Not Met: []? Adjusted       3. Patient will demonstrate an increase in strength to 4/5 or greater in R shoulder and UE to allow for proper functional mobility as indicated by patients Functional Deficits. [x]? Progressing: []? Met: []? Not Met: []? Adjusted       4. Patient will return to all transfers, work activities, and functional activities without increased symptoms or restriction. [x]? Progressing: []? Met: []? Not Met: []? Adjusted       5. Patient will have 0-2/10 pain with ADL's. [x]? Progressing: []? Met: []? Not Met: []? Adjusted       6. Patient stated goal: sleep through the night without waking due to pain  []? Progressing: [x]? Met: []? Not Met: []?  Adjusted    Prognosis: [x]Good   []Fair   []Poor    Patient Requires Follow-up:  [x]Yes  []No    Plan: []Plan of care initiated     [x]Continue per plan of care    [] Alter current plan (see comments)    []Hold pending MD visit []Discharge    Charges:  Timed Code Treatment Minutes: 45   Total Treatment Minutes:  60   [facility] time for each procedure?:  TE TIME:  NMR TIME:  MANUAL TIME:  UNTIMED MINUTES:       [] EVAL (LOW) 30449 (typically 20 minutes face-to-face)  [] EVAL (MOD) 92132 (typically 30 minutes face-to-face)  [] EVAL (HIGH) 46363 (typically 45 minutes face-to-face)  [] RE-EVAL        [x] LP(39477) x2     [] IONTO  [] NMR (62267) x     [x]? VASO  [x]? Manual (16598) x1    [] Other:  [] TA x      [] Mech Traction (86552)  [] ES(attended) (54290)     [] ES (un) (38648): Medicare Cap total YTD:   [x]N/A    Electronically signed by:  Luz Mike, PT, MPT, OMT-C [id number]                Note: If patient does not return for scheduled/ recommended follow up visits, this note will serve as a discharge from care along with most recent update on progress.

## 2021-10-21 ENCOUNTER — HOSPITAL ENCOUNTER (OUTPATIENT)
Dept: PHYSICAL THERAPY | Age: 76
Setting detail: THERAPIES SERIES
Discharge: HOME | End: 2021-10-21
Payer: MEDICARE

## 2021-10-21 PROCEDURE — 97110 THERAPEUTIC EXERCISES: CPT

## 2021-10-21 PROCEDURE — 97140 MANUAL THERAPY 1/> REGIONS: CPT

## 2021-10-21 NOTE — FLOWSHEET NOTE
[address] [place] [address] and Sports Rehabilitation, [address]  Phone [phone]  Fax [phone]    Outpatient Physical Therapy     [x] Daily Treatment Note   [] Progress Note  21,10/14/21 [] Discharge Note    Date:  10/21/2021    Patient Name:  Rubi López   \"Mariam\"       YOB: 1945    Medical Diagnosis: Closed displaced fx of greater tuberosity of R humerus with routine healing (S42.251D)         Treatment Diagnosis: limited ROM, weakness, poor posture, swelling, decreased functional status                          Onset Date:    21  (DOI)               Referral Date:  21                 Referring Physician: Sydnee Griffiths MD                                                    Visits Allowed/Insurance/Certification Information: [facility] Medicare, med Armin Wyatt no auth     Restrictions/Precautions:     Plan of care sent to provider:   []NA   []Faxed   [x]Co-signature       Plan of care signed:    []NA   [x]Yes 21, 21, 10/15/21   []No      Progress report will be due (10 Rx or 30 days whichever is less): at d/c next week    Recertification will be due (POC Duration  / 90 days whichever is less): 21     Visit# / total visits:     Visit # Insurance Allowable Auth Required   In Person  Med nec []  Yes     [x]  No    Tele Health 0  []  Yes     []  No    Total        Plan for Next Session:  Anticipate d/c from PT next week. Subjective  Patient reports she forgot her tylenol today and has not taken it the last day or two    21 Had xrays as follows:  Pertinent radiographs/imagin view right shoulder AP gray she scapular Y and Velpeau view 2021: Consolidating greater tuberosity fracture with healing callus and bridging present. Small anterior inferior glenoid fracture unchanged. Minimal displacement. Small ossific density in the axillary recess/pouch-unchanged.     Per MD note on 21, shoulder flex and abd to 90 deg x10 each direction, focusing on scapular control, 1# x   bicep curls 2# 2x10 x   PNF D2 2x10 in supine with manual resistance  Standing with yellow tband x   Bent over row 2x10 2# x   Bent over long arm ext 2x10 1# x   Bent over tricep kickback 2x10 1# x                                Therapeutic Exercise/Home Exercise Program:   x28 min. Reviewed and progressed exercises as noted above with yellow tband and new handouts given. Group Therapy:    0 minutes    Therapeutic Activity:  0 minutes     Gait: 0 minutes     Neuromuscular Re-Education:  0 minutes      Canalith Repositioning Procedure:  0 minutes    Manual Therapy:  15 minutes    PROM:  R shoulder in supine, flex, abd, ER, IR, elbow flex/ext, sup/pronation  Gentle inf oscillations for relaxation and pain relief    Modalities: 10 minutes  CP x 10 due to gameready in use   [] GAME READY (VASO)- for significant edema, swelling, pain control, 38 deg, min pressure, with patient seated     Functional Outcome Measure:   []NA  Measure Used: Dusty Lopes  Will do nv  Date Assessed: 8/25/21  Score:  70%    Assessment/Treatment/Activity Tolerance:    Patients response to treatment:   [x]Patient tolerated treatment well with no adverse reactions noted    []Patient limited by fatigue   []Patient limited by pain    []Patient limited by other medical complications   [x]Other: pain 0/10 after treatment    Goals:   Progress towards goals:  STG's MET. LTG #6 MET. Progressing towards remaining LTG's. GOALS:  Short Term Goals:  2 weeks  1. Independent in HEP and progression per patient tolerance, in order to prevent re-injury. []? Progressing: [x]? Met: []? Not Met: []? Adjusted       2. Patient will have a decrease in pain to facilitate improvement in movement, function, and ADLs as indicated by Functional Deficits. []? Progressing: [x]? Met: []? Not Met: []? Adjusted          Long Term Goals:  12 weeks  1.  Disability index score of 20% or less for the QuickDash to assist with reaching prior level of function. [x]? Progressing: []? Met: []? Not Met: []? Adjusted      2. Patient will demonstrate increased AROM R shoulder = L shoulder to allow for proper joint functioning as indicated by patients Functional Deficits. [x]? Progressing: []? Met: []? Not Met: []? Adjusted       3. Patient will demonstrate an increase in strength to 4/5 or greater in R shoulder and UE to allow for proper functional mobility as indicated by patients Functional Deficits. [x]? Progressing: []? Met: []? Not Met: []? Adjusted       4. Patient will return to all transfers, work activities, and functional activities without increased symptoms or restriction. [x]? Progressing: []? Met: []? Not Met: []? Adjusted       5. Patient will have 0-2/10 pain with ADL's. [x]? Progressing: []? Met: []? Not Met: []? Adjusted       6. Patient stated goal: sleep through the night without waking due to pain  []? Progressing: [x]? Met: []? Not Met: []? Adjusted    Prognosis: [x]Good   []Fair   []Poor    Patient Requires Follow-up:  [x]Yes  []No    Plan: []Plan of care initiated     [x]Continue per plan of care    [] Alter current plan (see comments)    []Hold pending MD visit []Discharge    Charges:  Timed Code Treatment Minutes: 43   Total Treatment Minutes:  43   [facility] time for each procedure?:  TE TIME:  NMR TIME:  MANUAL TIME:  UNTIMED MINUTES:       [] EVAL (LOW) 09098 (typically 20 minutes face-to-face)  [] EVAL (MOD) 34010 (typically 30 minutes face-to-face)  [] EVAL (HIGH) 57483 (typically 45 minutes face-to-face)  [] RE-EVAL        [x] HO(70617) x2     [] IONTO  [] NMR (24331) x     []? VASO  [x]? Manual (41147) x1    [] Other:  [] TA x      [] Mech Traction (25708)  [] ES(attended) (65210)     [] ES (un) (23962):     Medicare Cap total YTD:   [x]N/A    Electronically signed by:  SARAHI GOLDBERG,[id number]                Note: If patient does not return for scheduled/ recommended follow up visits, this note will serve as a discharge from care along with most recent update on progress.

## 2021-10-26 ENCOUNTER — HOSPITAL ENCOUNTER (OUTPATIENT)
Dept: PHYSICAL THERAPY | Age: 76
Setting detail: THERAPIES SERIES
Discharge: HOME | End: 2021-10-26
Payer: MEDICARE

## 2021-10-26 PROCEDURE — 97016 VASOPNEUMATIC DEVICE THERAPY: CPT

## 2021-10-26 PROCEDURE — 97110 THERAPEUTIC EXERCISES: CPT

## 2021-10-26 PROCEDURE — 97140 MANUAL THERAPY 1/> REGIONS: CPT

## 2021-10-26 NOTE — FLOWSHEET NOTE
[address] [place] [address] and Sports Rehabilitation, [id number]   Phone [phone]  Fax [phone]    Outpatient Physical Therapy     [x] Daily Treatment Note   [] Progress Note  9/21/21,10/14/21 [x] Discharge Note 10/26/21  Date:  10/26/2021    Patient Name:  Markus Shirley   \"Mariam\"       YOB: 1945    Medical Diagnosis: Closed displaced fx of greater tuberosity of R humerus with routine healing (S42.251D)         Treatment Diagnosis: limited ROM, weakness, poor posture, swelling, decreased functional status                          Onset Date:    7/19/21  (DOI)               Referral Date:  8/9/21                 Referring Physician: Paul Mosley MD                                                    Visits Allowed/Insurance/Certification Information: [facility] Medicare, [facility] Joan Owusu no auth     Restrictions/Precautions:     Plan of care sent to provider:   []NA   []Faxed   [x]Co-signature       Plan of care signed:    []NA   [x]Yes 8/25/21, 9/22/21, 10/15/21   []No      Progress report will be due (10 Rx or 30 days whichever is less): at d/c next week    Recertification will be due (POC Duration  / 90 days whichever is less): 11/25/21     Visit# / total visits:     Visit # Insurance Allowable Auth Required   In Person 19/24 Med nec []  Yes     [x]  No    Tele Health 0  []  Yes     []  No    Total 19/24       Plan for Next Session:  Patient should continue with HEP independently. Subjective  Patient reports she is doing great! She feels she has made 75% improvement with PT treatment. She is independent with reaching forward, ADL's, making bed, washing dishes, and laundry. She is still limited with reaching overhead and behind back. Patient requesting d/c from PT at this time. She recently moved to [place], and her  does not want to keep making the drive for PT treatment.   She is sleeping through the night without waking due to pain, except for when she occasionally rolls onto her R shoulder. Performing HEP 1x/day, 6-7 days/week. Not needing to take Tylenol as much for pain. 21 Had xrays as follows:  Pertinent radiographs/imagin view right shoulder AP gray she scapular Y and [place] view 2021: Consolidating greater tuberosity fracture with healing callus and bridging present. Small anterior inferior glenoid fracture unchanged. Minimal displacement. Small ossific density in the axillary recess/pouch-unchanged. Per MD note on 21, \"Physical therapy will continue to progress her with full range of motion and increase ADLs. I do not want them to work on active range of motion ~8-week point. She may come out of her sling at home at this point and do waist level activity. Passive and active assisted range of motion per physical therapy is also acceptable. Otherwise she may sleep in a sling for an additional 2 weeks ~8-week post injury point.  -No lifting greater than 2 to 5 pounds at this point. Again at waist level starting  -OTC Tylenol per bottle as needed discomfort  -Physical therapy will also work on pain modalities\"      Pain level:  Currently 0/10, 1-2/10 at worst since last visit R shoulder   AT EVAL: Patient describes pain to be intermittent in R shoulder in proximal humerus  Patient reports pain is  0/10 pain at present and  4/10 pain at its worst.  Worsened by: Any kind of movement of R arm        Objective:     PROM:  R shoulder flex 170, abd 180, ER 85 in 90 deg abd, IR 70 in 90 deg abd   AROM:  R shoulder flex 155, abd 100, Ext 57  MMT:  R shoulder flex 3+/5, abd 3+/5, ext 4+/5, ER 4/5, IR 4+/5  Palpation:  Anterior aspect R shoulder, over greater tuberosity   fx 21  End of 14 wk 10/25/21    Exercises:  Exercises in bold performed in department today.   Items not bolded are carried forward from prior visits for continuity of the record  Exercise/Equipment Resistance/Repetitions Issued for HEP     pulleys x5 min PT present for subjective info     AAROM  Shoulder flex, abd, ER with cane x5 each direction in supine x      strength  Yellow foam x   Supine back pocket shrugs and scap retraction 2x10 of each x   codmans x10 x   Table slides  x10 x   Axial elongation x10 x   Shoulder shrugs 2x10 x   Scapular clock (seated) x10 each (12/6, 3/9) x   Supine ceiling punches 3x10, 2# x   Supine figure 8 3x10, 2# x   Supine chest press 3x10, 2# x   sidelying shoulder abd to 90  2x10, 2# x   sidelying shoulder ER with arm at side 2x10, 2# x    R shoulder tband add,abd,flex,ext,ir,er x15 ea yellow tband x   Mid rows X20, red tband x   Rhythmic stabilization x1 min with light manual resistance x   B shoulder flex and abd to 90 deg x10 each direction, focusing on scapular control, 1# x   bicep curls 2# 2x10 x   PNF D2 2x10 in supine with manual resistance  Standing with yellow tband x   Bent over row 2x10 2# x   Bent over long arm ext 3x10 1# x   Bent over tricep kickback 2x10 2# x   Bent over horiz abd 2x10 1#                            Therapeutic Exercise/Home Exercise Program:   x30 min. Reviewed and progressed exercises as noted above with green tband issued for progression of HEP, already has red band at home. Assessment done for d/c note. Educated patient on importance of continuing with HEP to further progress strength and AROM.     Group Therapy:    0 minutes    Therapeutic Activity:  0 minutes     Gait: 0 minutes     Neuromuscular Re-Education:  0 minutes      Canalith Repositioning Procedure:  0 minutes    Manual Therapy:  15 minutes    PROM:  R shoulder in supine, flex, abd, ER, IR, elbow flex/ext, sup/pronation  Gentle inf oscillations for relaxation and pain relief    Modalities: 15 minutes   [x] GAME READY (VASO)- for significant edema, swelling, pain control, 38 deg, min pressure, with patient seated     Functional Outcome Measure:   []NA  Measure Used: Chance Glez    Date Assessed: 8/25/21  10/26/21  Score:  70%    25%    Assessment/Treatment/Activity Tolerance:  Patient made great progress with PT treatment. PROM is WNL. Still limited with AROM and strength of R shoulder, but able to perform all functional tasks except for fastening bra behind back and reaching overhead. Patient requesting d/c from PT at this time due to her  not wanting to bring her to PT treatment now that she is moved to [place]. Recommend patient continue with HEP independently to allow for further progress with her AROM and R shoulder strength, so that she may return to prior level of function without limitations. Patients response to treatment:   [x]Patient tolerated treatment well with no adverse reactions noted    []Patient limited by fatigue   []Patient limited by pain    []Patient limited by other medical complications   [x]Other: pain 0/10 after treatment    Goals:   Progress towards goals:  STG's MET. LTG #4,5,6 MET. Remaining LTG's not met due to pre-mature d/c from PT per patient request.   GOALS:  Short Term Goals:  2 weeks  1. Independent in HEP and progression per patient tolerance, in order to prevent re-injury. []? Progressing: [x]? Met: []? Not Met: []? Adjusted       2. Patient will have a decrease in pain to facilitate improvement in movement, function, and ADLs as indicated by Functional Deficits. []? Progressing: [x]? Met: []? Not Met: []? Adjusted          Long Term Goals:  12 weeks  1. Disability index score of 20% or less for the [employer] to assist with reaching prior level of function. []? Progressing: []? Met: [x]? Not Met: []? Adjusted      2. Patient will demonstrate increased AROM R shoulder = L shoulder to allow for proper joint functioning as indicated by patients Functional Deficits. []? Progressing: []? Met: [x]? Not Met: []? Adjusted       3.  Patient will demonstrate an increase in strength to 4/5 or greater in R shoulder and UE to allow for proper functional mobility as indicated by patients Functional Deficits. []? Progressing: []? Met: [x]? Not Met: []? Adjusted       4. Patient will return to all transfers, work activities, and functional activities without increased symptoms or restriction. []? Progressing: [x]? Met: []? Not Met: []? Adjusted       5. Patient will have 0-2/10 pain with ADL's.  []? Progressing: [x]? Met: []? Not Met: []? Adjusted       6. Patient stated goal: sleep through the night without waking due to pain  []? Progressing: [x]? Met: []? Not Met: []? Adjusted    Prognosis: [x]Good   []Fair   []Poor    Patient Requires Follow-up:  []Yes  [x]No    Plan: []Plan of care initiated     []Continue per plan of care    [] Alter current plan (see comments)    []Hold pending MD visit [x]Discharge    Charges:  Timed Code Treatment Minutes: 45   Total Treatment Minutes:  60   [facility] time for each procedure?:  TE TIME:  NMR TIME:  MANUAL TIME:  UNTIMED MINUTES:       [] EVAL (LOW) 99324 (typically 20 minutes face-to-face)  [] EVAL (MOD) 19972 (typically 30 minutes face-to-face)  [] EVAL (HIGH) 86461 (typically 45 minutes face-to-face)  [] RE-EVAL        [x] NT(07901) x2     [] IONTO  [] NMR (65466) x     [x]? VASO  [x]? Manual (03819) x1    [] Other:  [] TA x      [] Mech Traction (05770)  [] ES(attended) (00214)     [] ES (un) (07477): Medicare Cap total YTD:   [x]N/A    Electronically signed by:  Luz Mike, PT,MPT, OMT-C [id number]      Note: If patient does not return for scheduled/ recommended follow up visits, this note will serve as a discharge from care along with most recent update on progress.

## 2021-10-28 ENCOUNTER — APPOINTMENT (OUTPATIENT)
Dept: PHYSICAL THERAPY | Age: 76
End: 2021-10-28
Payer: MEDICARE

## 2021-11-02 LAB
CATARACTS: NEGATIVE
DIABETIC RETINOPATHY: NEGATIVE
GLAUCOMA: NEGATIVE
INTRAOCULAR PRESSURE EYE: NORMAL
VISUAL ACUITY DISTANCE LEFT EYE: NORMAL
VISUAL ACUITY DISTANCE RIGHT EYE: NORMAL

## 2021-11-05 RX ORDER — VALSARTAN 160 MG/1
TABLET ORAL
Qty: 90 TABLET | Refills: 0 | Status: SHIPPED | OUTPATIENT
Start: 2021-11-05 | End: 2021-11-10 | Stop reason: ALTCHOICE

## 2021-11-05 RX ORDER — HYDROCHLOROTHIAZIDE 25 MG/1
TABLET ORAL
Qty: 90 TABLET | Refills: 0 | Status: SHIPPED | OUTPATIENT
Start: 2021-11-05 | End: 2021-11-10 | Stop reason: ALTCHOICE

## 2021-11-05 RX ORDER — PIOGLITAZONEHYDROCHLORIDE 30 MG/1
TABLET ORAL
Qty: 90 TABLET | Refills: 0 | Status: SHIPPED | OUTPATIENT
Start: 2021-11-05 | End: 2022-02-02

## 2021-11-05 RX ORDER — GLIMEPIRIDE 4 MG/1
TABLET ORAL
Qty: 90 TABLET | Refills: 0 | Status: SHIPPED | OUTPATIENT
Start: 2021-11-05 | End: 2022-02-02

## 2021-11-05 RX ORDER — PRAVASTATIN SODIUM 40 MG
TABLET ORAL
Qty: 90 TABLET | Refills: 0 | Status: SHIPPED | OUTPATIENT
Start: 2021-11-05 | End: 2022-02-02

## 2021-11-08 ENCOUNTER — TELEPHONE (OUTPATIENT)
Dept: INTERNAL MEDICINE CLINIC | Age: 76
End: 2021-11-08

## 2021-11-08 RX ORDER — AMLODIPINE BESYLATE 5 MG/1
TABLET ORAL
Qty: 90 TABLET | Refills: 0 | Status: SHIPPED | OUTPATIENT
Start: 2021-11-08 | End: 2021-11-09

## 2021-11-08 NOTE — TELEPHONE ENCOUNTER
----- Message from Irine Hodgkins, MD sent at 11/8/2021 10:45 AM EST -----  Contact: [phone] Zach Mckeon  Ok to fill  ----- Message -----  From: Gianni Ochoa  Sent: 11/8/2021   9:49 AM EST  To: Irine Hodgkins, MD    Pt called and stated that she has 10 days left of amLODIPine (NORVASC) 5 MG tablet. She was wondering if she needs to go ahead and get it refilled or wait for her appointment on Wednesday?     Thank you

## 2021-11-09 RX ORDER — AMLODIPINE BESYLATE 5 MG/1
TABLET ORAL
Qty: 90 TABLET | Refills: 0 | Status: SHIPPED | OUTPATIENT
Start: 2021-11-09 | End: 2021-11-10 | Stop reason: SDUPTHER

## 2021-11-10 ENCOUNTER — OFFICE VISIT (OUTPATIENT)
Dept: INTERNAL MEDICINE CLINIC | Age: 76
End: 2021-11-10

## 2021-11-10 VITALS
BODY MASS INDEX: 42.29 KG/M2 | WEIGHT: 224 LBS | HEART RATE: 70 BPM | HEIGHT: 61 IN | RESPIRATION RATE: 12 BRPM | SYSTOLIC BLOOD PRESSURE: 122 MMHG | DIASTOLIC BLOOD PRESSURE: 80 MMHG

## 2021-11-10 DIAGNOSIS — E11.9 TYPE 2 DIABETES MELLITUS WITHOUT COMPLICATION, WITHOUT LONG-TERM CURRENT USE OF INSULIN (HCC): Primary | ICD-10-CM

## 2021-11-10 DIAGNOSIS — E78.5 HYPERLIPIDEMIA ASSOCIATED WITH TYPE 2 DIABETES MELLITUS (HCC): ICD-10-CM

## 2021-11-10 DIAGNOSIS — I10 BENIGN ESSENTIAL HTN: ICD-10-CM

## 2021-11-10 DIAGNOSIS — E66.01 MORBID OBESITY WITH BMI OF 40.0-44.9, ADULT (HCC): ICD-10-CM

## 2021-11-10 DIAGNOSIS — E11.69 HYPERLIPIDEMIA ASSOCIATED WITH TYPE 2 DIABETES MELLITUS (HCC): ICD-10-CM

## 2021-11-10 DIAGNOSIS — Z23 NEED FOR INFLUENZA VACCINATION: ICD-10-CM

## 2021-11-10 DIAGNOSIS — E11.9 TYPE 2 DIABETES MELLITUS WITHOUT COMPLICATION, WITHOUT LONG-TERM CURRENT USE OF INSULIN (HCC): ICD-10-CM

## 2021-11-10 DIAGNOSIS — N18.31 STAGE 3A CHRONIC KIDNEY DISEASE (HCC): ICD-10-CM

## 2021-11-10 DIAGNOSIS — F20.0 PARANOID TYPE SCHIZOPHRENIA (HCC): ICD-10-CM

## 2021-11-10 PROCEDURE — G8484 FLU IMMUNIZE NO ADMIN: HCPCS | Performed by: INTERNAL MEDICINE

## 2021-11-10 PROCEDURE — G8400 PT W/DXA NO RESULTS DOC: HCPCS | Performed by: INTERNAL MEDICINE

## 2021-11-10 PROCEDURE — G0008 ADMIN INFLUENZA VIRUS VAC: HCPCS | Performed by: INTERNAL MEDICINE

## 2021-11-10 PROCEDURE — 1036F TOBACCO NON-USER: CPT | Performed by: INTERNAL MEDICINE

## 2021-11-10 PROCEDURE — G8427 DOCREV CUR MEDS BY ELIG CLIN: HCPCS | Performed by: INTERNAL MEDICINE

## 2021-11-10 PROCEDURE — G8417 CALC BMI ABV UP PARAM F/U: HCPCS | Performed by: INTERNAL MEDICINE

## 2021-11-10 PROCEDURE — 1090F PRES/ABSN URINE INCON ASSESS: CPT | Performed by: INTERNAL MEDICINE

## 2021-11-10 PROCEDURE — 99214 OFFICE O/P EST MOD 30 MIN: CPT | Performed by: INTERNAL MEDICINE

## 2021-11-10 PROCEDURE — 4040F PNEUMOC VAC/ADMIN/RCVD: CPT | Performed by: INTERNAL MEDICINE

## 2021-11-10 PROCEDURE — 90662 IIV NO PRSV INCREASED AG IM: CPT | Performed by: INTERNAL MEDICINE

## 2021-11-10 PROCEDURE — 1123F ACP DISCUSS/DSCN MKR DOCD: CPT | Performed by: INTERNAL MEDICINE

## 2021-11-10 RX ORDER — VALSARTAN AND HYDROCHLOROTHIAZIDE 160; 25 MG/1; MG/1
1 TABLET ORAL DAILY
COMMUNITY
End: 2021-12-03 | Stop reason: SDUPTHER

## 2021-11-10 RX ORDER — AMLODIPINE BESYLATE 5 MG/1
TABLET ORAL
Qty: 90 TABLET | Refills: 0 | Status: SHIPPED | OUTPATIENT
Start: 2021-11-10 | End: 2022-01-10

## 2021-11-10 ASSESSMENT — ENCOUNTER SYMPTOMS
RHINORRHEA: 0
ABDOMINAL PAIN: 0
NAUSEA: 0
VOMITING: 0
SHORTNESS OF BREATH: 0
WHEEZING: 0

## 2021-11-10 NOTE — PROGRESS NOTES
Subjective:      Patient ID: Rubi López is a 68 y.o. female. HPI  Patient is here for follow up of diabetes, hypertension, hyperlipidemia, OA, schizophrenia and heel pain. Patient's BGs consistently in an acceptable range. Patient does not have polydipsia and polyuria. She has been checking her sugar bid. Her sugars are ranging from 76-96 mg/dl to 160-180 mg/dl. Her diabetes is well controlled. She is only taking her Amaryl daily. No hypoglycemia. No vision issues. No numbness or tingling in her feet. She has lost 6 lbs. She had to take steroids for poison sumac on her arms and her sugars were elevated during that period. Patient's BP is controlled on current medications. No chest pain or dyspnea. Med compliant. Her cholesterol is at goal. No myalgias. She has mild edema. She has schizophrenia. It is controlled. Review of Systems   Constitutional: Negative for appetite change and fatigue. HENT: Negative for postnasal drip and rhinorrhea. Respiratory: Negative for shortness of breath and wheezing. Cardiovascular: Negative for chest pain and palpitations. Gastrointestinal: Negative for abdominal pain, nausea and vomiting. Endocrine: Negative for polydipsia, polyphagia and polyuria. Musculoskeletal: Negative for joint swelling. Skin: Negative for rash. Neurological: Negative for light-headedness. Psychiatric/Behavioral: Negative for sleep disturbance.      Current Outpatient Medications   Medication Sig Dispense Refill    amLODIPine (NORVASC) 5 MG tablet TAKE 1 TABLET DAILY 90 tablet 0    pravastatin (PRAVACHOL) 40 MG tablet TAKE 1 TABLET BY MOUTH ONCE DAILY 90 tablet 0    valsartan (DIOVAN) 160 MG tablet TAKE 1 TABLET BY MOUTH  DAILY 90 tablet 0    hydroCHLOROthiazide (HYDRODIURIL) 25 MG tablet TAKE 1 TABLET BY MOUTH  DAILY 90 tablet 0    pioglitazone (ACTOS) 30 MG tablet TAKE 1 TABLET BY MOUTH ONCE DAILY 90 tablet 0    glimepiride (AMARYL) 4 MG tablet TAKE 1 TABLET BY MOUTH ONCE DAILY IN THE MORNING BEFORE BREAKFAST 90 tablet 0    Accu-Chek Softclix Lancets MISC USE TO TEST TWICE A DAY. DX:E11.9 100 each 11    blood glucose test strips (ACCU-CHEK NEY PLUS) strip USE TO TEST TWICE A DAY. DX: E11.9 100 strip 11    Calcium Carb-Cholecalciferol (CALCIUM PLUS VITAMIN D3) 600-800 MG-UNIT TABS Take by mouth 2 times daily      Wheat Dextrin (BENEFIBER PO) Take by mouth as needed      acetaminophen (TYLENOL) 325 MG tablet Take 325 mg by mouth as needed for Pain      busPIRone (BUSPAR) 10 MG tablet Take 10 mg by mouth 2 times daily      Blood Glucose Monitoring Suppl (ACCU-CHEK NEY PLUS) w/Device KIT Check twice daily. DX: E11.9 1 kit 0    Lancets MISC One every day 100 each 2    aspirin 81 MG EC tablet Take 81 mg by mouth daily.  risperidone (RISPERDAL) 2 MG tablet Take 2 mg by mouth daily. No current facility-administered medications for this visit. There are no changes to past medical history, family history, social history or review of systems(except as noted in the history section) since prior note (all reviewed with patient). /80 (Site: Right Wrist, Position: Sitting, Cuff Size: Medium Adult)   Pulse 70   Resp 12   Ht 5' 1\" (1.549 m)   Wt 224 lb (101.6 kg)   BMI 42.32 kg/m²     Objective:   Physical Exam  Constitutional:       Appearance: She is well-developed. HENT:      Head: Normocephalic. Eyes:      Conjunctiva/sclera: Conjunctivae normal.      Pupils: Pupils are equal, round, and reactive to light. Neck:      Thyroid: No thyroid mass or thyromegaly. Vascular: No carotid bruit or JVD. Trachea: Trachea normal.   Cardiovascular:      Rate and Rhythm: Normal rate and regular rhythm. Heart sounds: Murmur heard. Systolic murmur is present. No gallop. Pulmonary:      Effort: Pulmonary effort is normal. No respiratory distress. Breath sounds: Normal breath sounds. No wheezing or rales.    Abdominal: General: Bowel sounds are normal. There is no distension. Palpations: Abdomen is soft. There is no hepatomegaly, splenomegaly or mass. Tenderness: There is no abdominal tenderness. Musculoskeletal:      Cervical back: Normal range of motion and neck supple. Lymphadenopathy:      Cervical: No cervical adenopathy. Skin:     General: Skin is warm and dry. Findings: No rash. Neurological:      Mental Status: She is alert and oriented to person, place, and time. Motor: Tremor present. Psychiatric:         Behavior: Behavior normal.         Thought Content: Thought content normal.         Judgment: Judgment normal.       ECHO done on 7/14/2015  Conclusions    Summary  Normal left ventricle size, wall thickness and systolic function with an  estimated ejection fraction of 55%. No regional wall motion abnormalities  are seen. Borderline concentric LVH. There is trivial tricuspid regurgitation. Unable to obtain SPAP due to lack of TR envelope. Assessment:       Diagnosis Orders   1. Type 2 diabetes mellitus without complication, without long-term current use of insulin ([facility])  Hemoglobin A1C   2. Need for influenza vaccination  INFLUENZA, HIGH-DOSE, QUADV, 65 YRS +, IM, PF, 0.7ML (FLUZONE)   3. Benign essential HTN     4. Paranoid type schizophrenia ([facility]     5. Hyperlipidemia associated with type 2 diabetes mellitus ([facility]     6. Morbid obesity with BMI of 40.0-44.9, adult ([facility])     7. Stage 3a chronic kidney disease ([facility]            Plan:        DM: Well controlled. Check A1C. Hypertension:  At goal. Change to Diovan HCT combination pill. Hyperlipidemia:  At goal.   Schizophrenia: on medication. OA: no changes. Heart murmur:  No issues. She has noted some intermittent numbness in her hands and legs. She will let me know if it worsens. Discussed use, benefit, and side effects of prescribed medications. Barriers to medication compliance addressed.   All patient questions answered. Pt voiced understanding. Decrease calorie intake. Exercise, weight loss recommended. The current medical regimen is effective;  continue present plan and medications. See orders.

## 2021-11-11 LAB
ESTIMATED AVERAGE GLUCOSE: 139.9 MG/DL
HBA1C MFR BLD: 6.5 %

## 2021-12-03 RX ORDER — VALSARTAN AND HYDROCHLOROTHIAZIDE 160; 25 MG/1; MG/1
1 TABLET ORAL DAILY
Qty: 90 TABLET | Refills: 0 | Status: SHIPPED | OUTPATIENT
Start: 2021-12-03 | End: 2022-02-07

## 2022-01-10 RX ORDER — AMLODIPINE BESYLATE 5 MG/1
TABLET ORAL
Qty: 90 TABLET | Refills: 0 | Status: SHIPPED | OUTPATIENT
Start: 2022-01-10 | End: 2022-03-02

## 2022-02-02 RX ORDER — PIOGLITAZONEHYDROCHLORIDE 30 MG/1
TABLET ORAL
Qty: 90 TABLET | Refills: 3 | Status: SHIPPED | OUTPATIENT
Start: 2022-02-02

## 2022-02-02 RX ORDER — PRAVASTATIN SODIUM 40 MG
TABLET ORAL
Qty: 90 TABLET | Refills: 3 | Status: SHIPPED | OUTPATIENT
Start: 2022-02-02

## 2022-02-02 RX ORDER — GLIMEPIRIDE 4 MG/1
TABLET ORAL
Qty: 90 TABLET | Refills: 3 | Status: SHIPPED | OUTPATIENT
Start: 2022-02-02

## 2022-02-07 RX ORDER — VALSARTAN AND HYDROCHLOROTHIAZIDE 160; 25 MG/1; MG/1
1 TABLET ORAL DAILY
Qty: 90 TABLET | Refills: 0 | Status: SHIPPED | OUTPATIENT
Start: 2022-02-07 | End: 2022-05-02

## 2022-03-02 RX ORDER — AMLODIPINE BESYLATE 5 MG/1
TABLET ORAL
Qty: 90 TABLET | Refills: 1 | Status: SHIPPED | OUTPATIENT
Start: 2022-03-02 | End: 2022-08-17

## 2022-03-16 RX ORDER — BLOOD SUGAR DIAGNOSTIC
STRIP MISCELLANEOUS
Qty: 100 STRIP | Refills: 2 | Status: SHIPPED | OUTPATIENT
Start: 2022-03-16 | End: 2022-04-06 | Stop reason: SDUPTHER

## 2022-04-06 ENCOUNTER — OFFICE VISIT (OUTPATIENT)
Dept: INTERNAL MEDICINE CLINIC | Age: 77
End: 2022-04-06

## 2022-04-06 VITALS
SYSTOLIC BLOOD PRESSURE: 120 MMHG | HEART RATE: 70 BPM | WEIGHT: 229 LBS | HEIGHT: 61 IN | BODY MASS INDEX: 43.23 KG/M2 | RESPIRATION RATE: 12 BRPM | DIASTOLIC BLOOD PRESSURE: 80 MMHG

## 2022-04-06 DIAGNOSIS — E11.9 TYPE 2 DIABETES MELLITUS WITHOUT COMPLICATION, WITHOUT LONG-TERM CURRENT USE OF INSULIN (HCC): Primary | ICD-10-CM

## 2022-04-06 DIAGNOSIS — F20.0 PARANOID TYPE SCHIZOPHRENIA (HCC): ICD-10-CM

## 2022-04-06 DIAGNOSIS — E78.5 HYPERLIPIDEMIA ASSOCIATED WITH TYPE 2 DIABETES MELLITUS (HCC): ICD-10-CM

## 2022-04-06 DIAGNOSIS — I10 BENIGN ESSENTIAL HTN: ICD-10-CM

## 2022-04-06 DIAGNOSIS — E11.69 HYPERLIPIDEMIA ASSOCIATED WITH TYPE 2 DIABETES MELLITUS (HCC): ICD-10-CM

## 2022-04-06 DIAGNOSIS — E11.9 TYPE 2 DIABETES MELLITUS WITHOUT COMPLICATION, WITHOUT LONG-TERM CURRENT USE OF INSULIN (HCC): ICD-10-CM

## 2022-04-06 DIAGNOSIS — E66.01 MORBID OBESITY WITH BMI OF 40.0-44.9, ADULT (HCC): ICD-10-CM

## 2022-04-06 PROBLEM — N18.30 CHRONIC KIDNEY DISEASE, STAGE III (MODERATE) (HCC): Status: RESOLVED | Noted: 2019-09-03 | Resolved: 2022-04-06

## 2022-04-06 LAB
A/G RATIO: 1.5 (ref 1.1–2.2)
ALBUMIN SERPL-MCNC: 4 G/DL (ref 3.4–5)
ALP BLD-CCNC: 81 U/L (ref 40–129)
ALT SERPL-CCNC: 14 U/L (ref 10–40)
ANION GAP SERPL CALCULATED.3IONS-SCNC: 14 MMOL/L (ref 3–16)
AST SERPL-CCNC: 21 U/L (ref 15–37)
BASOPHILS ABSOLUTE: 0 K/UL (ref 0–0.2)
BASOPHILS RELATIVE PERCENT: 0.7 %
BILIRUB SERPL-MCNC: 0.3 MG/DL (ref 0–1)
BUN BLDV-MCNC: 17 MG/DL (ref 7–20)
CALCIUM SERPL-MCNC: 9.6 MG/DL (ref 8.3–10.6)
CHLORIDE BLD-SCNC: 103 MMOL/L (ref 99–110)
CO2: 25 MMOL/L (ref 21–32)
CREAT SERPL-MCNC: 0.9 MG/DL (ref 0.6–1.2)
EOSINOPHILS ABSOLUTE: 0.2 K/UL (ref 0–0.6)
EOSINOPHILS RELATIVE PERCENT: 2.7 %
GFR AFRICAN AMERICAN: >60
GFR NON-AFRICAN AMERICAN: >60
GLUCOSE BLD-MCNC: 94 MG/DL (ref 70–99)
HCT VFR BLD CALC: 43.2 % (ref 36–48)
HEMOGLOBIN: 13.8 G/DL (ref 12–16)
LYMPHOCYTES ABSOLUTE: 1.4 K/UL (ref 1–5.1)
LYMPHOCYTES RELATIVE PERCENT: 24.2 %
MCH RBC QN AUTO: 27.5 PG (ref 26–34)
MCHC RBC AUTO-ENTMCNC: 32 G/DL (ref 31–36)
MCV RBC AUTO: 86.1 FL (ref 80–100)
MONOCYTES ABSOLUTE: 0.6 K/UL (ref 0–1.3)
MONOCYTES RELATIVE PERCENT: 9.8 %
NEUTROPHILS ABSOLUTE: 3.7 K/UL (ref 1.7–7.7)
NEUTROPHILS RELATIVE PERCENT: 62.6 %
PDW BLD-RTO: 16.2 % (ref 12.4–15.4)
PLATELET # BLD: 238 K/UL (ref 135–450)
PMV BLD AUTO: 8.7 FL (ref 5–10.5)
POTASSIUM SERPL-SCNC: 4.2 MMOL/L (ref 3.5–5.1)
RBC # BLD: 5.01 M/UL (ref 4–5.2)
SODIUM BLD-SCNC: 142 MMOL/L (ref 136–145)
TOTAL PROTEIN: 6.6 G/DL (ref 6.4–8.2)
URIC ACID, SERUM: 5.4 MG/DL (ref 2.6–6)
WBC # BLD: 5.9 K/UL (ref 4–11)

## 2022-04-06 PROCEDURE — 99214 OFFICE O/P EST MOD 30 MIN: CPT | Performed by: INTERNAL MEDICINE

## 2022-04-06 PROCEDURE — 4040F PNEUMOC VAC/ADMIN/RCVD: CPT | Performed by: INTERNAL MEDICINE

## 2022-04-06 PROCEDURE — 1090F PRES/ABSN URINE INCON ASSESS: CPT | Performed by: INTERNAL MEDICINE

## 2022-04-06 PROCEDURE — 1036F TOBACCO NON-USER: CPT | Performed by: INTERNAL MEDICINE

## 2022-04-06 PROCEDURE — G8400 PT W/DXA NO RESULTS DOC: HCPCS | Performed by: INTERNAL MEDICINE

## 2022-04-06 PROCEDURE — 1123F ACP DISCUSS/DSCN MKR DOCD: CPT | Performed by: INTERNAL MEDICINE

## 2022-04-06 PROCEDURE — G8417 CALC BMI ABV UP PARAM F/U: HCPCS | Performed by: INTERNAL MEDICINE

## 2022-04-06 PROCEDURE — G8427 DOCREV CUR MEDS BY ELIG CLIN: HCPCS | Performed by: INTERNAL MEDICINE

## 2022-04-06 RX ORDER — LANCETS
EACH MISCELLANEOUS
Qty: 100 EACH | Refills: 11 | Status: SHIPPED | OUTPATIENT
Start: 2022-04-06

## 2022-04-06 RX ORDER — BLOOD SUGAR DIAGNOSTIC
STRIP MISCELLANEOUS
Qty: 100 STRIP | Refills: 2 | Status: SHIPPED | OUTPATIENT
Start: 2022-04-06 | End: 2022-09-26

## 2022-04-06 ASSESSMENT — ENCOUNTER SYMPTOMS
NAUSEA: 0
ABDOMINAL PAIN: 0
VOMITING: 0
RHINORRHEA: 0
SHORTNESS OF BREATH: 0
WHEEZING: 0

## 2022-04-06 NOTE — PROGRESS NOTES
Subjective:      Patient ID: Jose Antonio Corona is a 68 y.o. female. HPI  Patient is here for follow up of diabetes, hypertension, hyperlipidemia, OA, schizophrenia and heel pain. Patient's BGs consistently in an acceptable range. Patient does not have polydipsia and polyuria. She has been checking her sugar bid. Her sugars are ranging from 76-96 mg/dl to 160-180 mg/dl. Her diabetes is well controlled. She takes amaryl. No hypoglycemia. No vision issues. No numbness or tingling in her feet. She has lost 6 lbs. Patient's BP is controlled on current medications. No chest pain or dyspnea. Med compliant. Her cholesterol is at goal. No myalgias. She has mild edema. She has schizophrenia. It is controlled. Review of Systems   Constitutional: Negative for appetite change and fatigue. HENT: Negative for postnasal drip and rhinorrhea. Eyes: Negative for visual disturbance. Respiratory: Negative for shortness of breath and wheezing. Cardiovascular: Negative for chest pain and palpitations. Gastrointestinal: Negative for abdominal pain, nausea and vomiting. Endocrine: Negative for polydipsia, polyphagia and polyuria. Musculoskeletal: Negative for joint swelling. Skin: Negative for rash. Neurological: Negative for light-headedness and numbness. Psychiatric/Behavioral: Negative for sleep disturbance. Current Outpatient Medications   Medication Instructions    Accu-Chek Softclix Lancets MISC USE TO TEST TWICE A DAY. DX:E11.9    acetaminophen (TYLENOL) 325 mg, Oral, PRN    amLODIPine (NORVASC) 5 MG tablet TAKE 1 TABLET BY MOUTH  DAILY    aspirin 81 mg, DAILY    Blood Glucose Monitoring Suppl (ACCU-CHEK NEY PLUS) w/Device KIT Check twice daily. DX: E11.9    blood glucose test strips (ACCU-CHEK NEY PLUS) strip USE TO TEST TWICE A DAY.   DX:E11.9    busPIRone (BUSPAR) 10 mg, Oral, 2 TIMES DAILY    Calcium Carb-Cholecalciferol (CALCIUM PLUS VITAMIN D3) 600-800 MG-UNIT TABS Oral, 2 TIMES DAILY    glimepiride (AMARYL) 4 MG tablet TAKE 1 TABLET BY MOUTH ONCE DAILY IN THE MORNING BEFORE BREAKFAST    Lancets MISC One every day    pioglitazone (ACTOS) 30 MG tablet TAKE 1 TABLET BY MOUTH ONCE DAILY    pravastatin (PRAVACHOL) 40 MG tablet TAKE 1 TABLET BY MOUTH ONCE DAILY    risperiDONE (RISPERDAL) 2 mg, DAILY    valsartan-hydroCHLOROthiazide (DIOVAN-HCT) 160-25 MG per tablet 1 tablet, Oral, DAILY    Wheat Dextrin (BENEFIBER PO) Oral, PRN         There are no changes to past medical history, family history, social history or review of systems(except as noted in the history section) since prior note (all reviewed with patient). /80 (Site: Left Wrist, Position: Sitting, Cuff Size: Medium Adult)   Pulse 70   Resp 12   Ht 5' 1\" (1.549 m)   Wt 229 lb (103.9 kg)   BMI 43.27 kg/m²     Objective:   Physical Exam  Constitutional:       Appearance: She is well-developed. HENT:      Head: Normocephalic. Eyes:      Conjunctiva/sclera: Conjunctivae normal.      Pupils: Pupils are equal, round, and reactive to light. Neck:      Thyroid: No thyroid mass or thyromegaly. Vascular: No carotid bruit or JVD. Trachea: Trachea normal.   Cardiovascular:      Rate and Rhythm: Normal rate and regular rhythm. Heart sounds: Murmur heard. Systolic murmur is present. No gallop. Pulmonary:      Effort: Pulmonary effort is normal. No respiratory distress. Breath sounds: Normal breath sounds. No wheezing or rales. Abdominal:      General: Bowel sounds are normal. There is no distension. Palpations: Abdomen is soft. There is no hepatomegaly, splenomegaly or mass. Tenderness: There is no abdominal tenderness. Musculoskeletal:      Cervical back: Normal range of motion and neck supple. Lymphadenopathy:      Cervical: No cervical adenopathy. Skin:     General: Skin is warm and dry. Findings: No rash.    Neurological:      Mental Status: She is alert and oriented to person, place, and time. Motor: Tremor present. Psychiatric:         Behavior: Behavior normal.         Thought Content: Thought content normal.         Judgment: Judgment normal.       ECHO done on 7/14/2015  Conclusions    Summary  Normal left ventricle size, wall thickness and systolic function with an  estimated ejection fraction of 55%. No regional wall motion abnormalities  are seen. Borderline concentric LVH. There is trivial tricuspid regurgitation. Unable to obtain SPAP due to lack of TR envelope. Assessment:       Diagnosis Orders   1. Type 2 diabetes mellitus without complication, without long-term current use of insulin ([facility])  Hemoglobin A1C    CBC with Auto Differential    Comprehensive Metabolic Panel    Uric Acid   2. Hyperlipidemia associated with type 2 diabetes mellitus ([facility] [address]     3. Benign essential HTN     4. Paranoid type schizophrenia ([facility]     5. Morbid obesity with BMI of 40.0-44.9, adult ([facility]            Plan:        DM: Well controlled. Check A1C. Hypertension:  At goal. Continue medication. Hyperlipidemia:  At goal.   Schizophrenia: on medication. OA: no changes. Heart murmur:  No issues. Morbid Obesity  - Body mass index is 43.27 kg/m². - Complicating assessment and treatment. Placing patient at risk for multiple co-morbidities as well as early death and contributing to the patient's presentation.   - Counseled on weight loss. Discussed use, benefit, and side effects of prescribed medications. Barriers to medication compliance addressed. All patient questions answered. Pt voiced understanding. Decrease calorie intake. Exercise, weight loss recommended. The current medical regimen is effective;  continue present plan and medications. See orders.

## 2022-04-07 LAB
ESTIMATED AVERAGE GLUCOSE: 137 MG/DL
HBA1C MFR BLD: 6.4 %

## 2022-05-02 RX ORDER — VALSARTAN AND HYDROCHLOROTHIAZIDE 160; 25 MG/1; MG/1
1 TABLET ORAL DAILY
Qty: 90 TABLET | Refills: 0 | Status: SHIPPED | OUTPATIENT
Start: 2022-05-02 | End: 2022-07-26

## 2022-07-25 ENCOUNTER — OFFICE VISIT (OUTPATIENT)
Dept: INTERNAL MEDICINE CLINIC | Age: 77
End: 2022-07-25

## 2022-07-25 VITALS
BODY MASS INDEX: 43.8 KG/M2 | DIASTOLIC BLOOD PRESSURE: 78 MMHG | SYSTOLIC BLOOD PRESSURE: 120 MMHG | HEART RATE: 70 BPM | WEIGHT: 232 LBS | RESPIRATION RATE: 12 BRPM | HEIGHT: 61 IN

## 2022-07-25 DIAGNOSIS — E11.9 TYPE 2 DIABETES MELLITUS WITHOUT COMPLICATION, WITHOUT LONG-TERM CURRENT USE OF INSULIN (HCC): Primary | ICD-10-CM

## 2022-07-25 DIAGNOSIS — E78.5 HYPERLIPIDEMIA ASSOCIATED WITH TYPE 2 DIABETES MELLITUS (HCC): ICD-10-CM

## 2022-07-25 DIAGNOSIS — E66.01 MORBID OBESITY WITH BMI OF 40.0-44.9, ADULT (HCC): ICD-10-CM

## 2022-07-25 DIAGNOSIS — F20.0 PARANOID TYPE SCHIZOPHRENIA (HCC): ICD-10-CM

## 2022-07-25 DIAGNOSIS — I10 BENIGN ESSENTIAL HTN: ICD-10-CM

## 2022-07-25 DIAGNOSIS — E11.69 HYPERLIPIDEMIA ASSOCIATED WITH TYPE 2 DIABETES MELLITUS (HCC): ICD-10-CM

## 2022-07-25 PROCEDURE — 3044F HG A1C LEVEL LT 7.0%: CPT | Performed by: INTERNAL MEDICINE

## 2022-07-25 PROCEDURE — 99214 OFFICE O/P EST MOD 30 MIN: CPT | Performed by: INTERNAL MEDICINE

## 2022-07-25 PROCEDURE — 81002 URINALYSIS NONAUTO W/O SCOPE: CPT | Performed by: INTERNAL MEDICINE

## 2022-07-25 PROCEDURE — 1123F ACP DISCUSS/DSCN MKR DOCD: CPT | Performed by: INTERNAL MEDICINE

## 2022-07-25 ASSESSMENT — ENCOUNTER SYMPTOMS
NAUSEA: 0
WHEEZING: 0
RHINORRHEA: 0
VOMITING: 0
ABDOMINAL PAIN: 0
SHORTNESS OF BREATH: 0

## 2022-07-25 ASSESSMENT — PATIENT HEALTH QUESTIONNAIRE - PHQ9
2. FEELING DOWN, DEPRESSED OR HOPELESS: 0
SUM OF ALL RESPONSES TO PHQ9 QUESTIONS 1 & 2: 0
1. LITTLE INTEREST OR PLEASURE IN DOING THINGS: 0
SUM OF ALL RESPONSES TO PHQ QUESTIONS 1-9: 0

## 2022-07-25 NOTE — PROGRESS NOTES
Subjective:      Patient ID: Kim Jean is a 68 y.o. female. HPI  Patient is here for follow up of diabetes, hypertension, hyperlipidemia, OA, schizophrenia and heel pain. Patient's BGs consistently in an acceptable range. Patient does not have polydipsia and polyuria. She has been checking her sugar bid. Her sugars are ranging from  mg/dl to 160-188 mg/dl. Her diabetes is well controlled. She takes amaryl. No hypoglycemia. No vision issues. No numbness or tingling in her feet. Patient's BP is controlled on current medications. No chest pain or dyspnea. Med compliant. Her cholesterol is at goal. No myalgias. She has mild edema. She has schizophrenia. It is controlled. Review of Systems   Constitutional:  Negative for appetite change and fatigue. HENT:  Negative for postnasal drip and rhinorrhea. Eyes:  Negative for visual disturbance. Respiratory:  Negative for shortness of breath and wheezing. Cardiovascular:  Negative for chest pain and palpitations. Gastrointestinal:  Negative for abdominal pain, nausea and vomiting. Endocrine: Negative for polydipsia, polyphagia and polyuria. Musculoskeletal:  Negative for joint swelling. Skin:  Negative for rash. Neurological:  Negative for light-headedness and numbness. Psychiatric/Behavioral:  Negative for sleep disturbance. Current Outpatient Medications   Medication Instructions    Accu-Chek Softclix Lancets MISC USE TO TEST TWICE A DAY. DX:E11.9    acetaminophen (TYLENOL) 325 mg, Oral, PRN    amLODIPine (NORVASC) 5 MG tablet TAKE 1 TABLET BY MOUTH  DAILY    aspirin 81 mg, DAILY    Blood Glucose Monitoring Suppl (ACCU-CHEK NEY PLUS) w/Device KIT Check twice daily. DX: E11.9    blood glucose test strips (ACCU-CHEK NEY PLUS) strip USE TO TEST TWICE A DAY.   DX:E11.9    busPIRone (BUSPAR) 10 mg, Oral, 2 TIMES DAILY    Calcium Carb-Cholecalciferol (CALCIUM PLUS VITAMIN D3) 600-800 MG-UNIT TABS Oral, 2 TIMES DAILY alert and oriented to person, place, and time. Cranial Nerves: No cranial nerve deficit. Deep Tendon Reflexes: Reflexes are normal and symmetric. Psychiatric:         Behavior: Behavior normal.         Thought Content: Thought content normal.         Judgment: Judgment normal.           ECHO done on 7/14/2015  Conclusions    Summary  Normal left ventricle size, wall thickness and systolic function with an  estimated ejection fraction of 55%. No regional wall motion abnormalities  are seen. Borderline concentric LVH. There is trivial tricuspid regurgitation. Unable to obtain SPAP due to lack of TR envelope. Assessment:       Diagnosis Orders   1. Type 2 diabetes mellitus without complication, without long-term current use of insulin ([facility])  Hemoglobin A1C    POCT Urinalysis no Micro    Microalbumin / Creatinine Urine Ratio    Lipid Panel      2. Benign essential HTN        3. Hyperlipidemia associated with type 2 diabetes mellitus ([facility]        4. Morbid obesity with BMI of 40.0-44.9, [facility]        5. Paranoid type schizophrenia ([facility]               Plan:        DM: Well controlled. Check A1C. Hypertension:  At goal. Continue medication. Hyperlipidemia:  At goal. Check lipids. Schizophrenia: on medication. OA: no changes. Heart murmur:  No issues. Morbid Obesity  - Body mass index is 43.84 kg/m². - Complicating assessment and treatment. Placing patient at risk for multiple co-morbidities as well as early death and contributing to the patient's presentation.   - Counseled on weight loss. Discussed use, benefit, and side effects of prescribed medications. Barriers to medication compliance addressed. All patient questions answered. Pt voiced understanding. Decrease calorie intake. Exercise, weight loss recommended. The current medical regimen is effective;  continue present plan and medications. See orders.

## 2022-07-26 DIAGNOSIS — E11.9 TYPE 2 DIABETES MELLITUS WITHOUT COMPLICATION, WITHOUT LONG-TERM CURRENT USE OF INSULIN (HCC): ICD-10-CM

## 2022-07-26 LAB
BILIRUBIN, POC: NORMAL
BLOOD URINE, POC: NORMAL
CHOLESTEROL, TOTAL: 144 MG/DL (ref 0–199)
CLARITY, POC: NORMAL
COLOR, POC: NORMAL
CREATININE URINE: 30.7 MG/DL (ref 28–259)
GLUCOSE URINE, POC: NORMAL
HDLC SERPL-MCNC: 58 MG/DL (ref 40–60)
KETONES, POC: NORMAL
LDL CHOLESTEROL CALCULATED: 62 MG/DL
LEUKOCYTE EST, POC: NORMAL
MICROALBUMIN UR-MCNC: <1.2 MG/DL
MICROALBUMIN/CREAT UR-RTO: NORMAL MG/G (ref 0–30)
NITRITE, POC: NORMAL
PH, POC: NORMAL
PROTEIN, POC: NORMAL
SPECIFIC GRAVITY, POC: NORMAL
TRIGL SERPL-MCNC: 120 MG/DL (ref 0–150)
UROBILINOGEN, POC: NORMAL
VLDLC SERPL CALC-MCNC: 24 MG/DL

## 2022-07-26 RX ORDER — VALSARTAN AND HYDROCHLOROTHIAZIDE 160; 25 MG/1; MG/1
1 TABLET ORAL DAILY
Qty: 90 TABLET | Refills: 0 | Status: SHIPPED | OUTPATIENT
Start: 2022-07-26

## 2022-07-27 LAB
ESTIMATED AVERAGE GLUCOSE: 134.1 MG/DL
HBA1C MFR BLD: 6.3 %

## 2022-08-17 RX ORDER — AMLODIPINE BESYLATE 5 MG/1
TABLET ORAL
Qty: 90 TABLET | Refills: 0 | Status: SHIPPED | OUTPATIENT
Start: 2022-08-17 | End: 2022-11-02

## 2022-09-26 RX ORDER — BLOOD SUGAR DIAGNOSTIC
STRIP MISCELLANEOUS
Qty: 100 STRIP | Refills: 2 | Status: SHIPPED | OUTPATIENT
Start: 2022-09-26

## 2022-10-19 ENCOUNTER — TELEPHONE (OUTPATIENT)
Dept: INTERNAL MEDICINE CLINIC | Age: 77
End: 2022-10-19

## 2022-10-19 ENCOUNTER — NURSE ONLY (OUTPATIENT)
Dept: INTERNAL MEDICINE CLINIC | Age: 77
End: 2022-10-19

## 2022-10-19 DIAGNOSIS — R39.9 UTI SYMPTOMS: Primary | ICD-10-CM

## 2022-10-19 LAB
BILIRUBIN, POC: NORMAL
BLOOD URINE, POC: NORMAL
CLARITY, POC: NORMAL
COLOR, POC: NORMAL
GLUCOSE URINE, POC: NORMAL
KETONES, POC: NORMAL
LEUKOCYTE EST, POC: NORMAL
NITRITE, POC: NORMAL
PH, POC: NORMAL
PROTEIN, POC: NORMAL
SPECIFIC GRAVITY, POC: NORMAL
UROBILINOGEN, POC: NORMAL

## 2022-10-19 PROCEDURE — 81002 URINALYSIS NONAUTO W/O SCOPE: CPT | Performed by: INTERNAL MEDICINE

## 2022-10-19 RX ORDER — CEPHALEXIN 250 MG/1
250 CAPSULE ORAL 3 TIMES DAILY
Qty: 21 CAPSULE | Refills: 0 | Status: SHIPPED | OUTPATIENT
Start: 2022-10-19

## 2022-10-19 NOTE — TELEPHONE ENCOUNTER
----- Message from Andrea Morales MD sent at 10/19/2022  8:27 AM EDT -----  Contact: Hugo Ho [phone]  Keflex 250 mg po tid #21  ----- Message -----  From: Roxi Ary  Sent: 10/19/2022   8:26 AM EDT  To: Andrea Morales MD    Patient states she has a UTI. Symptoms include urgency, frequency, pain upon urination. She is asking for medication. She was instructed to bring a urine sample.      [facility]     Thank you

## 2022-10-19 NOTE — TELEPHONE ENCOUNTER
Patient dropped off urine today for urine culture as patient was having symptoms. Patient informed that we had no phlebotomist in the office and urine had to be dumped. Per Dr Neha Mars, patient informed to finish antibiotic that was prescribed today and if she is not any better after finishing it then she will need to drop off another urine sample.

## 2022-11-02 RX ORDER — AMLODIPINE BESYLATE 5 MG/1
TABLET ORAL
Qty: 90 TABLET | Refills: 0 | Status: SHIPPED | OUTPATIENT
Start: 2022-11-02

## 2022-11-07 RX ORDER — VALSARTAN AND HYDROCHLOROTHIAZIDE 160; 25 MG/1; MG/1
1 TABLET ORAL DAILY
Qty: 90 TABLET | Refills: 0 | Status: SHIPPED | OUTPATIENT
Start: 2022-11-07

## 2022-11-09 ENCOUNTER — TELEPHONE (OUTPATIENT)
Dept: INTERNAL MEDICINE CLINIC | Age: 77
End: 2022-11-09

## 2022-11-09 NOTE — TELEPHONE ENCOUNTER
----- Message from Silverio Mejia MD sent at 11/9/2022 12:46 PM EST -----  Contact: Charlie Hay [phone]  Have her get checked at urgent care or [facility]  ----- Message -----  From: Heather Churchill  Sent: 11/9/2022   8:44 AM EST  To: Silverio Mejia MD    Patient states she thinks she is having a late reaction to cephALEXin (KEFLEX) 250 MG capsule. Patient states she started the medication on 10/19/22 and ended it 10/26/22. Patient states the reaction started about a week ago. Patient states she has a rash on her legs, chest, arms, and neck that is very itchy. Patient is requesting an appointment.    [facility]

## 2022-11-16 ENCOUNTER — OFFICE VISIT (OUTPATIENT)
Dept: INTERNAL MEDICINE CLINIC | Age: 77
End: 2022-11-16

## 2022-11-16 VITALS
SYSTOLIC BLOOD PRESSURE: 120 MMHG | HEART RATE: 84 BPM | HEIGHT: 61 IN | WEIGHT: 228 LBS | DIASTOLIC BLOOD PRESSURE: 72 MMHG | BODY MASS INDEX: 43.05 KG/M2 | RESPIRATION RATE: 16 BRPM

## 2022-11-16 DIAGNOSIS — T78.40XA ALLERGIC REACTION TO DRUG, INITIAL ENCOUNTER: Primary | ICD-10-CM

## 2022-11-16 DIAGNOSIS — R21 RASH: ICD-10-CM

## 2022-11-16 PROCEDURE — 3074F SYST BP LT 130 MM HG: CPT

## 2022-11-16 PROCEDURE — 3078F DIAST BP <80 MM HG: CPT

## 2022-11-16 PROCEDURE — 99213 OFFICE O/P EST LOW 20 MIN: CPT

## 2022-11-16 PROCEDURE — 1123F ACP DISCUSS/DSCN MKR DOCD: CPT

## 2022-11-16 RX ORDER — METHYLPREDNISOLONE 4 MG/1
TABLET ORAL
Qty: 1 KIT | Refills: 0 | Status: SHIPPED | OUTPATIENT
Start: 2022-11-16 | End: 2022-11-22

## 2022-11-16 ASSESSMENT — ENCOUNTER SYMPTOMS
STRIDOR: 0
ABDOMINAL PAIN: 0
DIARRHEA: 0
VOICE CHANGE: 0
VOMITING: 0
COUGH: 0
WHEEZING: 0
TROUBLE SWALLOWING: 0
FACIAL SWELLING: 0
CHOKING: 0
SHORTNESS OF BREATH: 0
CONSTIPATION: 0
NAUSEA: 0

## 2022-11-16 NOTE — PROGRESS NOTES
Chief Complaint   Patient presents with    Rash        HPI  Sharlon Carrel is a 68 y.o. female who presents for rash on her chest and arms. Patient states that Oct 19 she called the office with concern for a UTI and was prescribed Keflex x 7 days. Shortly after this she developed a red itchy rash on her chest, arms, feet and around her eyes. Around the same time, she switched brands of her calcium supplement which had different ingredients on the label and she is worried that she may be allergic to something in it as well. She denies contact with new substances including new materials, plants, creams/lotions and she has not changed her laundry detergent. She completed her course of keflex and has discontinued the new calcium supplement and the rash remains on her chest and arms. The rash around her toes and eyes have resolved. Her UTI has resolved. She denies fevers, chills, difficulty breathing, wheezing or stridor. Her rash has been pruritic, red and warm. No hives, pustules, vesicles or open wounds. Review of Systems   Constitutional:  Negative for chills, fatigue and fever. HENT:  Negative for facial swelling, trouble swallowing and voice change. Respiratory:  Negative for cough, choking, shortness of breath, wheezing and stridor. Cardiovascular:  Negative for chest pain, palpitations and leg swelling. Gastrointestinal:  Negative for abdominal pain, constipation, diarrhea, nausea and vomiting. Skin:  Positive for rash. Allergic/Immunologic: Positive for environmental allergies. Negative for food allergies. Neurological:  Negative for dizziness, syncope, weakness, light-headedness, numbness and headaches. Psychiatric/Behavioral:  Negative for agitation and behavioral problems. The patient is not nervous/anxious.         Allergies  Tessalon [benzonatate], Keflex [cephalexin], Diazepam, Diclofenac sodium, Lycopene, Metformin, Olanzapine, Tape [adhesive tape], and Zocor [simvastatin - high dose]      Current Medications  Current Outpatient Medications   Medication Sig Dispense Refill    methylPREDNISolone (MEDROL DOSEPACK) 4 MG tablet By mouth. 1 kit 0    valsartan-hydroCHLOROthiazide (DIOVAN-HCT) 160-25 MG per tablet TAKE 1 TABLET BY MOUTH  DAILY 90 tablet 0    amLODIPine (NORVASC) 5 MG tablet TAKE 1 TABLET BY MOUTH  DAILY 90 tablet 0    ACCU-CHEK NEY PLUS strip USE TO TEST TWICE A DAY. DX:E11.9 100 strip 2    Accu-Chek Softclix Lancets MISC USE TO TEST TWICE A DAY. DX:E11.9 100 each 11    glimepiride (AMARYL) 4 MG tablet TAKE 1 TABLET BY MOUTH ONCE DAILY IN THE MORNING BEFORE BREAKFAST 90 tablet 3    pioglitazone (ACTOS) 30 MG tablet TAKE 1 TABLET BY MOUTH ONCE DAILY 90 tablet 3    pravastatin (PRAVACHOL) 40 MG tablet TAKE 1 TABLET BY MOUTH ONCE DAILY 90 tablet 3    Calcium Carb-Cholecalciferol (CALCIUM PLUS VITAMIN D3) 600-800 MG-UNIT TABS Take by mouth 2 times daily      Wheat Dextrin (BENEFIBER PO) Take by mouth as needed      acetaminophen (TYLENOL) 325 MG tablet Take 325 mg by mouth as needed for Pain      busPIRone (BUSPAR) 10 MG tablet Take 10 mg by mouth 2 times daily      Blood Glucose Monitoring Suppl (ACCU-CHEK NEY PLUS) w/Device KIT Check twice daily. DX: E11.9 1 kit 0    Lancets MISC One every day 100 each 2    aspirin 81 MG EC tablet Take 81 mg by mouth daily. risperidone (RISPERDAL) 2 MG tablet Take 2 mg by mouth daily. No current facility-administered medications for this visit.        Past Medical History  Past Medical History:   Diagnosis Date    Hyperlipidemia     Hyperlipidemia associated with type [facility] mellitus [facility] 10/5/2015    Hypertension     Morbid obesity with BMI of 40.0-44.9, adult ([facility] 10/5/2015    Osteoarthrosis, not specified whether generalized/localized, lower leg     Paranoid type schizophrenia ([facility]     Type 2 diabetes mellitus without complication ([facility] 45/5/2506    Type II or unspecified type diabetes mellitus without mention of complication, not stated as uncontrolled        Social History  Social History     Socioeconomic History    Marital status:      Spouse name: Not on file    Number of children: Not on file    Years of education: Not on file    Highest education level: Not on file   Occupational History    Not on file   Tobacco Use    Smoking status: Never    Smokeless tobacco: Never   Vaping Use    Vaping Use: Never used   Substance and Sexual Activity    Alcohol use: No    Drug use: No    Sexual activity: Not Currently     Partners: Male   Other Topics Concern    Not on file   Social History Narrative    Not on file     Social Determinants of Health     Financial Resource Strain: Not on file   Food Insecurity: Not on file   Transportation Needs: Not on file   Physical Activity: Not on file   Stress: Not on file   Social Connections: Not on file   Intimate Partner Violence: Not on file   Housing Stability: Not on file       Surgical History  Past Surgical History:   Procedure Laterality Date    BLEPHAROPLASTY  5/10/2012    COLONOSCOPY  8/1/2007    JOINT REPLACEMENT         Vitals:    11/16/22 0918   BP: 120/72   Site: Left Upper Arm   Position: Sitting   Cuff Size: Large Adult   Pulse: 84   Resp: 16   Weight: 228 lb (103.4 kg)   Height: 5' 1\" (1.549 m)       Physical Exam  Constitutional:       General: She is not in acute distress. Appearance: Normal appearance. She is obese. She is not ill-appearing. HENT:      Head: Normocephalic and atraumatic. Right Ear: External ear normal.      Left Ear: External ear normal.      Nose: Nose normal. No congestion or rhinorrhea. Mouth/Throat:      Mouth: Mucous membranes are moist.      Pharynx: Oropharynx is clear. No oropharyngeal exudate or posterior oropharyngeal erythema. Eyes:      Extraocular Movements: Extraocular movements intact.       Conjunctiva/sclera: Conjunctivae normal.      Pupils: Pupils are equal, round, and reactive to light. Cardiovascular:      Rate and Rhythm: Normal rate and regular rhythm. Pulses: Normal pulses. Heart sounds: Normal heart sounds. No murmur heard. No friction rub. No gallop. Pulmonary:      Effort: Pulmonary effort is normal. No respiratory distress. Breath sounds: Normal breath sounds. No stridor. No wheezing, rhonchi or rales. Abdominal:      General: Bowel sounds are normal.      Palpations: Abdomen is soft. Tenderness: There is no abdominal tenderness. Musculoskeletal:         General: Normal range of motion. Cervical back: Normal range of motion. Skin:     General: Skin is warm. Capillary Refill: Capillary refill takes less than 2 seconds. Findings: Rash (Erythematous and uniform rash across medial aspect of bilateral arms to axillae as well as across chest. Mildly warm to touch. Does not appear infected. Non-tender to touch. No induration or demarcation.) present. Neurological:      General: No focal deficit present. Mental Status: She is alert and oriented to person, place, and time. Psychiatric:         Mood and Affect: Mood normal.         Behavior: Behavior normal.         Thought Content: Thought content normal.         Assessment/Plan     1. Allergic reaction to drug, initial encounter  2. Rash  - Given above history and timing of rash onset in conjunction with Keflex I believe this is a drug reaction to Keflex.  -I cannot exclude allergic reaction to an ingredient in her new calcium supplement however, as there are several ingredients and I do not have her previous supplement to compare.   -Patient has been advised to transition back to her previous calcium supplement as this is did not give her issues previously. -I have added allergy to Keflex in her chart. She has been provided a methylprednisolone taper which should help her rash.    -She has been instructed that if her rash does not resolve or worsens despite steroid treatment she should call the office.   - methylPREDNISolone (MEDROL DOSEPACK) 4 MG tablet; By mouth. Dispense: 1 kit;  Refill: 0      Ewa Bell PA-C  11/16/2022 2:18 PM

## 2022-11-22 ENCOUNTER — TELEPHONE (OUTPATIENT)
Dept: INTERNAL MEDICINE CLINIC | Age: 77
End: 2022-11-22

## 2022-11-22 NOTE — TELEPHONE ENCOUNTER
----- Message from 32 Fanny Zambrano sent at 11/22/2022  2:00 PM EST -----  Contact: Annika Nagel   [phone]  Per Dr Gage Axon she is talking about a cream then refill. No pills as she was just on medrol dose pack. ----- Message -----  From: Santo Moyer  Sent: 11/22/2022  12:31 PM EST  To: Rashad Arambula MD    Patient states she had an allergy to keflex that caused a rash. She has been prescribed steroids for that and states it is 90% better. Right now the patient is concerned about a leaf allergy to the leaves outside. States it is causing her arms to be red and itchy. States last year she was given hydrocortisone for this issue. Please advise.  ----- Message -----  From: Rashad Arambula MD  Sent: 11/22/2022  10:45 AM EST  To: Asia Elizondo    Flonase 2 puffs each nostril daily  ----- Message -----  From: Marisol Herbert  Sent: 11/22/2022   9:47 AM EST  To: Rashad Arambula MD    Patient states had seen RQEY 93\49\92. And was having issues with allergies and he had given her some steroids. Patient states is doing better but feels she needs some ore medication not completely over it.       [facility]/pharmacy #[id number] - [address], [address] (Ph: [phone])

## 2022-11-29 ENCOUNTER — OFFICE VISIT (OUTPATIENT)
Dept: INTERNAL MEDICINE CLINIC | Age: 77
End: 2022-11-29

## 2022-11-29 VITALS
DIASTOLIC BLOOD PRESSURE: 80 MMHG | BODY MASS INDEX: 43.05 KG/M2 | HEIGHT: 61 IN | WEIGHT: 228 LBS | SYSTOLIC BLOOD PRESSURE: 120 MMHG | HEART RATE: 70 BPM | RESPIRATION RATE: 12 BRPM

## 2022-11-29 DIAGNOSIS — N18.31 STAGE 3A CHRONIC KIDNEY DISEASE (HCC): ICD-10-CM

## 2022-11-29 DIAGNOSIS — E78.5 HYPERLIPIDEMIA ASSOCIATED WITH TYPE 2 DIABETES MELLITUS (HCC): ICD-10-CM

## 2022-11-29 DIAGNOSIS — E11.9 TYPE 2 DIABETES MELLITUS WITHOUT COMPLICATION, WITHOUT LONG-TERM CURRENT USE OF INSULIN (HCC): ICD-10-CM

## 2022-11-29 DIAGNOSIS — E66.01 MORBID OBESITY WITH BMI OF 40.0-44.9, ADULT (HCC): ICD-10-CM

## 2022-11-29 DIAGNOSIS — I10 BENIGN ESSENTIAL HTN: ICD-10-CM

## 2022-11-29 DIAGNOSIS — M81.0 AGE-RELATED OSTEOPOROSIS WITHOUT CURRENT PATHOLOGICAL FRACTURE: ICD-10-CM

## 2022-11-29 DIAGNOSIS — Z00.00 MEDICARE ANNUAL WELLNESS VISIT, SUBSEQUENT: Primary | ICD-10-CM

## 2022-11-29 DIAGNOSIS — E11.69 HYPERLIPIDEMIA ASSOCIATED WITH TYPE 2 DIABETES MELLITUS (HCC): ICD-10-CM

## 2022-11-29 DIAGNOSIS — F20.0 PARANOID TYPE SCHIZOPHRENIA (HCC): ICD-10-CM

## 2022-11-29 LAB
A/G RATIO: 1.5 (ref 1.1–2.2)
ALBUMIN SERPL-MCNC: 4.2 G/DL (ref 3.4–5)
ALP BLD-CCNC: 89 U/L (ref 40–129)
ALT SERPL-CCNC: 20 U/L (ref 10–40)
ANION GAP SERPL CALCULATED.3IONS-SCNC: 13 MMOL/L (ref 3–16)
AST SERPL-CCNC: 24 U/L (ref 15–37)
BASOPHILS ABSOLUTE: 0.1 K/UL (ref 0–0.2)
BASOPHILS RELATIVE PERCENT: 0.9 %
BILIRUB SERPL-MCNC: 0.3 MG/DL (ref 0–1)
BILIRUBIN, POC: NORMAL
BLOOD URINE, POC: NORMAL
BUN BLDV-MCNC: 14 MG/DL (ref 7–20)
CALCIUM SERPL-MCNC: 10.4 MG/DL (ref 8.3–10.6)
CHLORIDE BLD-SCNC: 98 MMOL/L (ref 99–110)
CLARITY, POC: NORMAL
CO2: 29 MMOL/L (ref 21–32)
COLOR, POC: NORMAL
CREAT SERPL-MCNC: 0.8 MG/DL (ref 0.6–1.2)
EOSINOPHILS ABSOLUTE: 0.2 K/UL (ref 0–0.6)
EOSINOPHILS RELATIVE PERCENT: 1.9 %
GFR SERPL CREATININE-BSD FRML MDRD: >60 ML/MIN/{1.73_M2}
GLUCOSE BLD-MCNC: 97 MG/DL (ref 70–99)
GLUCOSE URINE, POC: NORMAL
HCT VFR BLD CALC: 43.9 % (ref 36–48)
HEMOGLOBIN: 14.2 G/DL (ref 12–16)
KETONES, POC: NORMAL
LEUKOCYTE EST, POC: NORMAL
LYMPHOCYTES ABSOLUTE: 1.6 K/UL (ref 1–5.1)
LYMPHOCYTES RELATIVE PERCENT: 20.3 %
MCH RBC QN AUTO: 28.4 PG (ref 26–34)
MCHC RBC AUTO-ENTMCNC: 32.3 G/DL (ref 31–36)
MCV RBC AUTO: 87.9 FL (ref 80–100)
MONOCYTES ABSOLUTE: 0.8 K/UL (ref 0–1.3)
MONOCYTES RELATIVE PERCENT: 9.6 %
NEUTROPHILS ABSOLUTE: 5.3 K/UL (ref 1.7–7.7)
NEUTROPHILS RELATIVE PERCENT: 67.3 %
NITRITE, POC: NORMAL
PDW BLD-RTO: 15.8 % (ref 12.4–15.4)
PH, POC: NORMAL
PLATELET # BLD: 234 K/UL (ref 135–450)
PMV BLD AUTO: 9.1 FL (ref 5–10.5)
POTASSIUM SERPL-SCNC: 4.5 MMOL/L (ref 3.5–5.1)
PROTEIN, POC: NORMAL
RBC # BLD: 5 M/UL (ref 4–5.2)
SODIUM BLD-SCNC: 140 MMOL/L (ref 136–145)
SPECIFIC GRAVITY, POC: NORMAL
TOTAL PROTEIN: 7 G/DL (ref 6.4–8.2)
UROBILINOGEN, POC: NORMAL
WBC # BLD: 7.9 K/UL (ref 4–11)

## 2022-11-29 PROCEDURE — 3074F SYST BP LT 130 MM HG: CPT | Performed by: INTERNAL MEDICINE

## 2022-11-29 PROCEDURE — 1123F ACP DISCUSS/DSCN MKR DOCD: CPT | Performed by: INTERNAL MEDICINE

## 2022-11-29 PROCEDURE — 3044F HG A1C LEVEL LT 7.0%: CPT | Performed by: INTERNAL MEDICINE

## 2022-11-29 PROCEDURE — G0439 PPPS, SUBSEQ VISIT: HCPCS | Performed by: INTERNAL MEDICINE

## 2022-11-29 PROCEDURE — 81002 URINALYSIS NONAUTO W/O SCOPE: CPT | Performed by: INTERNAL MEDICINE

## 2022-11-29 PROCEDURE — 3078F DIAST BP <80 MM HG: CPT | Performed by: INTERNAL MEDICINE

## 2022-11-29 ASSESSMENT — LIFESTYLE VARIABLES
HOW OFTEN DO YOU HAVE A DRINK CONTAINING ALCOHOL: NEVER
HOW MANY STANDARD DRINKS CONTAINING ALCOHOL DO YOU HAVE ON A TYPICAL DAY: PATIENT DOES NOT DRINK

## 2022-11-29 ASSESSMENT — PATIENT HEALTH QUESTIONNAIRE - PHQ9
1. LITTLE INTEREST OR PLEASURE IN DOING THINGS: 0
SUM OF ALL RESPONSES TO PHQ QUESTIONS 1-9: 0
2. FEELING DOWN, DEPRESSED OR HOPELESS: 0
SUM OF ALL RESPONSES TO PHQ9 QUESTIONS 1 & 2: 0

## 2022-11-29 NOTE — PROGRESS NOTES
Medicare Annual Wellness Visit    Jacek Granda is here for Medicare AWV    Assessment & Plan   Medicare annual wellness visit, subsequent  Type 2 diabetes mellitus without complication, without long-term current use of insulin ([facility]  -     CBC with Auto Differential; Future  -     Comprehensive Metabolic Panel; Future  -     Hemoglobin A1C; Future  -     POCT Urinalysis no Micro  Benign essential HTN  Paranoid type schizophrenia (HCC)  Hyperlipidemia associated with type 2 diabetes mellitus ([facility]  Morbid obesity with BMI of 40.0-44.9, adult (HCC)  Stage 3a chronic kidney disease (HCC)  Age-related osteoporosis without current pathological fracture  -     DEXA BONE DENSITY AXIAL SKELETON; Future        -Medicare physical  - DM type 2 check labs  - HTN well controlled  - Schizophrenia. Stable. - HLD. Controlled  - Morbid Obesity  - Body mass index is 43.08 kg/m². - Complicating assessment and treatment. Placing patient at risk for multiple co-morbidities as well as early death and contributing to the patient's presentation.   - Weight loss would be beneficial    #  CKD stable. #  Rash. Use HC. Improving. Recommendations for Preventive Services Due: see orders and patient instructions/AVS.  Recommended screening schedule for the next 5-10 years is provided to the patient in written form: see Patient Instructions/AVS.     No follow-ups on file. Subjective   The following acute and/or chronic problems were also addressed today:      She had a reaction to Keflex. Rash is better. She took steroids. She had reaction to [facility] and is doing better. Rash is better. Patient is here for follow up of diabetes, hypertension, hyperlipidemia, OA, schizophrenia and heel pain. Patient's BGs consistently in an acceptable range. Patient does not have polydipsia and polyuria. She has been checking her sugar bid. Her sugars are controlled. Her diabetes is well controlled. She takes amaryl. No hypoglycemia.  No vision issues. No numbness or tingling in her feet. Patient's BP is controlled on current medications. No chest pain or dyspnea. Med compliant. Her cholesterol is at goal. No myalgias. She has mild edema. She has schizophrenia. It is controlled    Patient's complete Health Risk Assessment and screening values have been reviewed and are found in Flowsheets. The following problems were reviewed today and where indicated follow up appointments were made and/or referrals ordered.     Positive Risk Factor Screenings with Interventions:    Fall Risk:  Do you feel unsteady or are you worried about falling? : (!) yes  2 or more falls in past year?: no  Fall with injury in past year?: no   Fall Risk Interventions:    Home safety tips provided            General Health and ACP:  General  In general, how would you say your health is?: Fair  In the past 7 days, have you experienced any of the following: New or Increased Pain, New or Increased Fatigue, Loneliness, Social Isolation, Stress or Anger?: (!) Yes  Select all that apply: (!) New or Increased Pain  Do you get the social and emotional support that you need?: Yes  Do you have a Living Will?: Yes    Advance Directives       Power of  Living Will ACP-Advance Directive ACP-Power of     Not on File Not on File Not on File Not on File        General Health Risk Interventions:  Pain issues: from UTI    Health Habits/Nutrition:  Physical Activity: Inactive    Days of Exercise per Week: 0 days    Minutes of Exercise per Session: 0 min     Have you lost any weight without trying in the past 3 months?: No  Body mass index: (!) 43.08  Have you seen the dentist within the past year?: Yes  Health Habits/Nutrition Interventions:  Nutritional issues:  educational materials for healthy, well-balanced diet provided    Hearing/Vision:  Do you or your family notice any trouble with your hearing that hasn't been managed with hearing aids?: No  Do you have difficulty driving, watching TV, or doing any of your daily activities because of your eyesight?: (!) Yes  Have you had an eye exam within the past year?: (!) No  No results found. Hearing/Vision Interventions:  Vision concerns:  patient encouraged to make appointment with his/her eye specialist     ADLs:  In the past 7 days, did you need help from others to perform any of the following everyday activities: Eating, dressing, grooming, bathing, toileting, or walking/balance?: (!) Yes  Select all that apply: (!) Dressing  In the past 7 days, did you need help from others to take care of any of the following: Laundry, housekeeping, banking/finances, shopping, telephone use, food preparation, transportation, or taking medications?: No  ADL Interventions:  She has help          Objective   Vitals:    11/29/22 1016   BP: 120/80   Site: Left Upper Arm   Position: Sitting   Cuff Size: Large Adult   Pulse: 70   Resp: 12   Weight: 228 lb (103.4 kg)   Height: 5' 1\" (1.549 m)      Body mass index is 43.08 kg/m². General Appearance: alert and oriented to person, place and time, well developed and well- nourished, in no acute distress  Skin: warm and dry, rash right forearm fading.   Head: normocephalic and atraumatic  Eyes: pupils equal, round, and reactive to light, extraocular eye movements intact, conjunctivae normal  ENT: tympanic membrane, external ear and ear canal normal bilaterally, nose without deformity, nasal mucosa and turbinates normal without polyps  Neck: supple and non-tender without mass, no thyromegaly or thyroid nodules, no cervical lymphadenopathy  Pulmonary/Chest: clear to auscultation bilaterally- no wheezes, rales or rhonchi, normal air movement, no respiratory distress  Cardiovascular: normal rate, regular rhythm, normal S1 and S2, no murmurs, rubs, clicks, or gallops, distal pulses intact, no carotid bruits  Abdomen: soft, non-tender, non-distended, normal bowel sounds, no masses or organomegaly  Extremities: no cyanosis, clubbing or edema  Musculoskeletal: normal range of motion, no joint swelling, deformity or tenderness  Neurologic: reflexes normal and symmetric, no cranial nerve deficit, gait, coordination and speech normal. Tremor +      Allergies   Allergen Reactions    Tessalon [Benzonatate]     Keflex [Cephalexin] Rash     Diffuse rash on chest and arms following initiation of Keflex    Diazepam     Diclofenac Sodium     Lycopene      Multiv-Min-FA-Lycopene-Lutein    Metformin     Olanzapine     Tape [Adhesive Tape]     Zocor [Simvastatin - High Dose]      Prior to Visit Medications    Medication Sig Taking? Authorizing Provider   hydrocortisone 2.5 % cream Apply topically 2 times daily to affected area. Castillo Busch MD   valsartan-hydroCHLOROthiazide (DIOVAN-HCT) 160-25 MG per tablet TAKE 1 TABLET BY MOUTH  DAILY  Castillo Busch MD   amLODIPine (NORVASC) 5 MG tablet TAKE 1 TABLET BY MOUTH  DAILY  Castillo Busch MD   ACCU-CHEK NEY PLUS strip USE TO TEST TWICE A DAY. DX:E11.9  Castillo Busch MD   Accu-Bam Softclix Lancets MISC USE TO TEST TWICE A DAY.   DX:E11.9  Castillo Busch MD   glimepiride (AMARYL) 4 MG tablet TAKE 1 TABLET BY MOUTH ONCE DAILY IN THE MORNING BEFORE BREAKFAST  Castillo Busch MD   pioglitazone (ACTOS) 30 MG tablet TAKE 1 TABLET BY MOUTH ONCE DAILY  Castillo Busch MD   pravastatin (PRAVACHOL) 40 MG tablet TAKE 1 TABLET BY MOUTH ONCE DAILY  Castillo Busch MD   Calcium Carb-Cholecalciferol (CALCIUM PLUS VITAMIN D3) 600-800 MG-UNIT TABS Take by mouth 2 times daily  Historical Provider, MD   Wheat Dextrin (BENEFIBER PO) Take by mouth as needed  Historical Provider, MD   acetaminophen (TYLENOL) 325 MG tablet Take 325 mg by mouth as needed for Pain  Historical Provider, MD   busPIRone (BUSPAR) 10 MG tablet Take 10 mg by mouth 2 times daily  Historical Provider, MD   Blood Glucose Monitoring Suppl (ACCU-CHEK NEY PLUS) w/Device KIT Check twice daily. DX: E11.9  Ellaree Severe, MD   Lancets MISC One every day  Ellaree Severe, MD   aspirin 81 MG EC tablet Take 81 mg by mouth daily. Historical Provider, MD   risperidone (RISPERDAL) 2 MG tablet Take 2 mg by mouth daily.     Historical Provider, MD [place] (Including outside providers/suppliers regularly involved in providing care):   Patient Care Team:  Ellaree Severe, MD as PCP - Naz Thompson MD as PCP - [facility] Empaneled Provider  Bruna Collins MD (Ophthalmology)     Reviewed and updated this visit:  Tobacco  Allergies  Meds  Problems  Med Hx  Surg Hx  Soc Hx  Fam Hx

## 2022-11-29 NOTE — PATIENT INSTRUCTIONS
Personalized Preventive Plan for [employer] Members - 11/29/2022  Medicare offers a range of preventive health benefits. Some of the tests and screenings are paid in full while other may be subject to a deductible, co-insurance, and/or copay. Some of these benefits include a comprehensive review of your medical history including lifestyle, illnesses that may run in your family, and various assessments and screenings as appropriate. After reviewing your medical record and screening and assessments performed today your provider may have ordered immunizations, labs, imaging, and/or referrals for you. A list of these orders (if applicable) as well as your Preventive Care list are included within your After Visit Summary for your review. Other Preventive Recommendations:    A preventive eye exam performed by an eye specialist is recommended every 1-2 years to screen for glaucoma; cataracts, macular degeneration, and other eye disorders. A preventive dental visit is recommended every 6 months. Try to get at least 150 minutes of exercise per week or 10,000 steps per day on a pedometer . Order or download the FREE \"Exercise & Physical Activity: Your Everyday Guide\" from [employer]. Call 7-621.386.3362 or search [employer] online. You need 3417-5486 mg of calcium and 2347-9511 IU of vitamin D per day. It is possible to meet your calcium requirement with diet alone, but a vitamin D supplement is usually necessary to meet this goal.  When exposed to the sun, use a sunscreen that protects against both UVA and UVB radiation with an SPF of 30 or greater. Reapply every 2 to 3 hours or after sweating, drying off with a towel, or swimming. Always wear a seat belt when traveling in a car. Always wear a helmet when riding a bicycle or motorcycle.   Keep Your Memory Pepper Purdy       Many factors can affect your ability to remembera hectic lifestyle, aging, stress, chronic disease, and certain medicines. But, there are steps you can take to sharpen your mind and help preserve your memory. Challenge Your Brain   Regularly challenging your mind may help keeps it in top shape. Good mental exercises include:   Crossword puzzlesUse a dictionary if you need it; you will learn more that way. Brainteasers Try some! Crafts, such as wood working and sewing   Hobbies, such as gardening and building model airplanes   SocializingVisit old friends or join groups to meet new ones. Reading   Learning a new language   Taking a class, whether it be art history or monae chi   TravelingExperience the food, history, and culture of your destination   Learning to use a computer   Going to museums, the theater, or thought-provoking movies   Changing things in your daily life, such as reversing your pattern in the grocery store or brushing your teeth using your nondominant hand   Use Memory Aids   There is no need to remember every detail on your own. These memory aids can help:   Calendars and day planners   Electronic organizers to store all sorts of helpful informationThese devices can \"beep\" to remind you of appointments. A book of days to record birthdays, anniversaries, and other occasions that occur on the same date every year   Detailed \"to-do\" lists and strategically placed sticky notes   Quick \"study\" sessionsBefore a gathering, review who will be there so their names will be fresh in your mind. Establish routinesFor example, keep your keys, wallet, and umbrella in the same place all the time or take medicine with your 8:00 AM glass of juice   Live a Healthy Life   Many actions that will keep your body strong will do the same for your mind. For example:   Talk to Your Doctor About Herbs and Supplements    Malnutrition and vitamin deficiencies can impair your mental function. For example, vitamin B12 deficiency can cause a range of symptoms, including confusion.  But, what if your nutritional needs are being met? Can herbs and supplements still offer a benefit? Researchers have investigated a range of natural remedies, such as ginkgo , ginseng , and the supplement phosphatidylserine (PS). So far, though, the evidence is inconsistent as to whether these products can improve memory or thinking. If you are interested in taking herbs and supplements, talk to your doctor first because they may interact with other medicines that you are taking. Exercise Regularly    Among the many benefits of regular exercise are increased blood flow to the brain and decreased risk of certain diseases that can interfere with memory function. One study found that even moderate exercise has a beneficial effect. Examples of \"moderate\" exercise include:   Playing 18 holes of golf once a week, without a cart   Playing tennis twice a week   Walking one mile per day   Manage Stress    It can be tough to remember what is important when your mind is cluttered. Make time for relaxation. Choose activities that calm you down, and make it routine. Manage Chronic Conditions    Side effects of high blood pressure , diabetes, and heart disease can interfere with mental function. Many of the lifestyle steps discussed here can help manage these conditions. Strive to eat a healthy diet, exercise regularly, get stress under control, and follow your doctor's advice for your condition. Minimize Medications    Talk to your doctor about the medicines that you take. Some may be unnecessary. Also, healthy lifestyle habits may lower the need for certain drugs. Last Reviewed: April 2010 Fadumo Dangelo MD   Updated: 4/13/2010     Keeping Home a [address] [facility]       As we get older, changes in balance, gait, strength, vision, hearing, and cognition make even the most youthful senior more prone to accidents. Falls are one of the leading health risks for older people.  This increased risk of falling is related to:   Aging process (eg, decreased muscle strength, slowed reflexes)   Higher incidence of chronic health problems (eg, arthritis, diabetes) that may limit mobility, agility or sensory awareness   Side effects of medicine (eg, dizziness, blurred vision)especially medicines like prescription pain medicines and drugs used to treat mental health conditions   Depending on the brittleness of your bones, the consequences of a fall can be serious and long lasting. Home Life   Research by the Association of Aging [facility]) shows that some home accidents among older adults can be prevented by making simple lifestyle changes and basic modifications and repairs to the home environment. Here are some lifestyle changes that experts recommend:   Have your hearing and vision checked regularly. Be sure to wear prescription glasses that are right for you. Speak to your doctor or pharmacist about the possible side effects of your medicines. A number of medicines can cause dizziness. If you have problems with sleep, talk to your doctor. Limit your intake of alcohol. If necessary, use a cane or walker to help maintain your balance. Wear supportive, rubber-soled shoes, even at home. If you live in a region that gets wintry weather, you may want to put special cleats on your shoes to prevent you from slipping on the snow and ice. Exercise regularly to help maintain muscle tone, agility, and balance. Always hold the banister when going up or down stairs. Also, use  bars when getting in or out of the bath or shower, or using the toilet. To avoid dizziness, get up slowly from a lying down position. Sit up first, dangling your legs for a minute or two before rising to a standing position. Overall Home Safety Check   According to the Consumer [employer] Safety Commision's \"Older Consumer Home Safety Checklist,\" it is important to check for potential hazards in each room. And remember, proper lighting is an essential factor in home safety.  If you cannot see clearly, you are more likely to fall. Important questions to ask yourself include:   Are lamp, electric, extension, and telephone cords placed out of the flow of traffic and maintained in good condition? Have frayed cords been replaced? Are all small rugs and runners slip resistant? If not, you can secure them to the floor with a special double-sided carpet tape. Are smoke detectors properly locatedone on every floor of your home and one outside of every sleeping area? Are they in good working order? Are batteries replaced at least once a year? Do you have a well-maintained carbon monoxide detector outside every sleeping are in your home? Does your furniture layout leave plenty of space to maneuver between and around chairs, tables, beds, and sofas? Are hallways, stairs and passages between rooms well lit? Can you reach a lamp without getting out of bed? Are floor surfaces well maintained? Shag rugs, high-pile carpeting, tile floors, and polished wood floors can be particularly slippery. Stairs should always have handrails and be carpeted or fitted with a non-skid tread. Is your telephone easily reachable. Is the cord safely tucked away? Room by Room   According to the Association of Aging, bathrooms and kristi are the two most potentially hazardous rooms in your home. In the Kitchen    Be sure your stove is in proper working order and always make sure burners and the oven are off before you go out or go to sleep. Keep pots on the back burners, turn handles away from the front of the stove, and keep stove clean and free of grease build-up. Kitchen ventilation systems and range exhausts should be working properly. Keep flammable objects such as towels and pot holders away from the cooking area except when in use. Make sure kitchen curtains are tied back. Move cords and appliances away from the sink and hot surfaces.  If extension cords are needed, install wiring guides so they do not hang over the sink, range, or working areas. Look for coffee pots, kettles and toaster ovens with automatic shut-offs. Keep a mop handy in the kitchen so you can wipe up spills instantly. You should also have a small fire extinguisher. Arrange your kitchen with frequently used items on lower shelves to avoid the need to stand on a stepstool to reach them. Make sure countertops are well-lit to avoid injuries while cutting and preparing food. In the Bathroom    Use a non-slip mat or decals in the tub and shower, since wet, soapy tile or porcelain surfaces are extremely slippery. Make sure bathroom rugs are non-skid or tape them firmly to the floor. Bathtubs should have at least one, preferably two, grab bars, firmly attached to structural supports in the wall. (Do not use built-in soap holders or glass shower doors as grab bars.)    Tub seats fitted with non-slip material on the legs allow you to wash sitting down. For people with limited mobility, bathtub transfer benches allow you to slide safely into the tub. Raised toilet seats and toilet safety rails are helpful for those with knee or hip problems. In the [facility]    Make sure you use a nightlight and that the area around your bed is clear of potential obstacles. Be careful with electric blankets and never go to sleep with a heating pad, which can cause serious burns even if on a low setting. Use fire-resistant mattress covers and pillows, and NEVER smoke in bed. Keep a phone next to the bed that is programmed to dial 911 at the push of a button. If you have a chronic condition, you may want to sign on with an automatic call-in service. Typically the system includes a small pendant that connects directly to an emergency medical voice-response system. You should also make arrangements to stay in contact with someonefriend, neighbor, family memberon a regular schedule.    Fire Prevention   According to the [employer]. (Smoke Alarms for Every) [facility], senior citizens are one of the two highest risk groups for death and serious injuries due to residential fires. When cooking, wear short-sleeved items, never a bulky long-sleeved robe. The [facility]'s Safety Checklist for Older Consumers emphasizes the importance of checking basements, garages, workshops and storage areas for fire hazards, such as volatile liquids, piles of old rags or clothing and overloaded circuits. Never smoke in bed or when lying down on a couch or recliner chair. Small portable electric or kerosene heaters are responsible for many home fires and should be used cautiously if at all. If you do use one, be sure to keep them away from flammable materials. In case of fire, make sure you have a pre-established emergency exit plan. Have a professional check your fireplace and other fuel-burning appliances yearly. [employer]   Baby boomers entering the negrete years will continue to see the development of new products to help older adults live safely and independently in spite of age-related changes. Making Life More Livable  , by Ronan Colby, lists over 1,000 products for \"living well in the mature years,\" such as bathing and mobility aids, household security devices, ergonomically designed knives and peelers, and faucet valves and knobs for temperature control. Medical supply stores and organizations are good sources of information about products that improve your quality of life and insure your safety.      Last Reviewed: November 2009 Lilliam Skinner MD   Updated: 3/7/2011

## 2022-11-30 LAB
ESTIMATED AVERAGE GLUCOSE: 142.7 MG/DL
HBA1C MFR BLD: 6.6 %

## 2022-12-02 ENCOUNTER — HOSPITAL ENCOUNTER (OUTPATIENT)
Dept: GENERAL RADIOLOGY | Age: 77
Discharge: HOME | End: 2022-12-02
Payer: MEDICARE

## 2022-12-02 DIAGNOSIS — M81.0 AGE-RELATED OSTEOPOROSIS WITHOUT CURRENT PATHOLOGICAL FRACTURE: ICD-10-CM

## 2022-12-02 PROCEDURE — 77080 DXA BONE DENSITY AXIAL: CPT

## 2023-01-10 ENCOUNTER — TELEPHONE (OUTPATIENT)
Dept: INTERNAL MEDICINE CLINIC | Age: 78
End: 2023-01-10

## 2023-01-10 NOTE — TELEPHONE ENCOUNTER
----- Message from Darshana Thompson MD sent at 1/10/2023  1:30 PM EST -----  See Tyree Fabian  ----- Message -----  From: Tiny Hearing  Sent: 1/10/2023  10:04 AM EST  To: aDrshana Thompson MD    Calling about rash she has had since October which she has been seen for before and was wondering if she needs to be seen in the office again or can you recommend a dermatologist.  [phone]

## 2023-01-13 ENCOUNTER — OFFICE VISIT (OUTPATIENT)
Dept: INTERNAL MEDICINE CLINIC | Age: 78
End: 2023-01-13

## 2023-01-13 VITALS
BODY MASS INDEX: 43.8 KG/M2 | RESPIRATION RATE: 18 BRPM | WEIGHT: 232 LBS | DIASTOLIC BLOOD PRESSURE: 60 MMHG | HEART RATE: 80 BPM | SYSTOLIC BLOOD PRESSURE: 130 MMHG | HEIGHT: 61 IN

## 2023-01-13 DIAGNOSIS — E66.01 OBESITY, CLASS III, BMI 40-49.9 (MORBID OBESITY) (HCC): ICD-10-CM

## 2023-01-13 DIAGNOSIS — L50.9 HIVES: Primary | ICD-10-CM

## 2023-01-13 PROCEDURE — 99213 OFFICE O/P EST LOW 20 MIN: CPT | Performed by: NURSE PRACTITIONER

## 2023-01-13 PROCEDURE — 3078F DIAST BP <80 MM HG: CPT | Performed by: NURSE PRACTITIONER

## 2023-01-13 PROCEDURE — 1123F ACP DISCUSS/DSCN MKR DOCD: CPT | Performed by: NURSE PRACTITIONER

## 2023-01-13 PROCEDURE — 3075F SYST BP GE 130 - 139MM HG: CPT | Performed by: NURSE PRACTITIONER

## 2023-01-13 RX ORDER — TRIAMCINOLONE ACETONIDE 1 MG/G
CREAM TOPICAL
Qty: 15 G | Refills: 1 | Status: SHIPPED | OUTPATIENT
Start: 2023-01-13

## 2023-01-13 RX ORDER — PREDNISONE 10 MG/1
TABLET ORAL
Qty: 27 TABLET | Refills: 0 | Status: SHIPPED | OUTPATIENT
Start: 2023-01-13 | End: 2023-01-23

## 2023-01-13 ASSESSMENT — ENCOUNTER SYMPTOMS: SHORTNESS OF BREATH: 1

## 2023-01-13 NOTE — PROGRESS NOTES
Chief Complaint:   Joey Gunn is a 68 y.o. female who presents for   Chief Complaint   Patient presents with    Rash        Rash  Associated symptoms include shortness of breath (at baseline). Patient was given Keflex 10/19/22 for UTI. The rash started after that. She was seen at [facility] urgent care in [place] on 11/9/22. She was given a steroid injection. She saw Gifty Porterma on 11/16/22 and was given Medrol dosepack. She was then given hydrocortisone cream on 11/22/22. She was told when she has a fragrance allergy. She uses fragrance free things. She has not changed soaps or detergents for years. Review of Systems  Review of Systems   Respiratory:  Positive for shortness of breath (at baseline). Skin:  Positive for rash. Allergies  Tessalon [benzonatate], Keflex [cephalexin], Diazepam, Diclofenac sodium, Lycopene, Metformin, Olanzapine, Tape [adhesive tape], and Zocor [simvastatin - high dose]      Vitals  /60 (Site: Right Upper Arm, Position: Sitting)   Pulse 80   Resp 18   Ht 5' 1\" (1.549 m)   Wt 232 lb (105.2 kg)   BMI 43.84 kg/m²     Current Medications  Current Outpatient Medications   Medication Sig Dispense Refill    hydrocortisone 2.5 % cream Apply topically 2 times daily to affected area. 28 g 0    valsartan-hydroCHLOROthiazide (DIOVAN-HCT) 160-25 MG per tablet TAKE 1 TABLET BY MOUTH  DAILY 90 tablet 0    amLODIPine (NORVASC) 5 MG tablet TAKE 1 TABLET BY MOUTH  DAILY 90 tablet 0    ACCU-CHEK NEY PLUS strip USE TO TEST TWICE A DAY. DX:E11.9 100 strip 2    Accu-Chek Softclix Lancets MISC USE TO TEST TWICE A DAY.   DX:E11.9 100 each 11    glimepiride (AMARYL) 4 MG tablet TAKE 1 TABLET BY MOUTH ONCE DAILY IN THE MORNING BEFORE BREAKFAST 90 tablet 3    pioglitazone (ACTOS) 30 MG tablet TAKE 1 TABLET BY MOUTH ONCE DAILY 90 tablet 3    pravastatin (PRAVACHOL) 40 MG tablet TAKE 1 TABLET BY MOUTH ONCE DAILY 90 tablet 3    Calcium Carb-Cholecalciferol (CALCIUM PLUS VITAMIN D3) 600-800 MG-UNIT TABS Take by mouth 2 times daily      Wheat Dextrin (BENEFIBER PO) Take by mouth as needed      acetaminophen (TYLENOL) 325 MG tablet Take 325 mg by mouth as needed for Pain      busPIRone (BUSPAR) 10 MG tablet Take 10 mg by mouth 2 times daily      Blood Glucose Monitoring Suppl (ACCU-CHEK NEY PLUS) w/Device KIT Check twice daily. DX: E11.9 1 kit 0    Lancets MISC One every day 100 each 2    aspirin 81 MG EC tablet Take 81 mg by mouth daily. risperidone (RISPERDAL) 2 MG tablet Take 2 mg by mouth daily. No current facility-administered medications for this visit.        Past Medical History  Past Medical History:   Diagnosis Date    Hyperlipidemia     Hyperlipidemia associated with type 2 diabetes mellitus ([facility] 10/5/2015    Hypertension     Morbid obesity with BMI of 40.0-44.9, adult ([facility] 10/5/2015    Osteoarthrosis, not specified whether generalized/localized, lower leg     Paranoid type schizophrenia ([facility]     Type 2 diabetes mellitus without complication ([facility] 85/6/4974    Type II or unspecified type diabetes mellitus without mention of complication, not stated as uncontrolled        Social History  Social History     Socioeconomic History    Marital status:      Spouse name: Not on file    Number of children: Not on file    Years of education: Not on file    Highest education level: Not on file   Occupational History    Not on file   Tobacco Use    Smoking status: Never    Smokeless tobacco: Never   Vaping Use    Vaping Use: Never used   Substance and Sexual Activity    Alcohol use: No    Drug use: No    Sexual activity: Not Currently     Partners: Male   Other Topics Concern    Not on file   Social History Narrative    Not on file     Social Determinants of Health     Financial Resource Strain: Not on file   Food Insecurity: Not on file   Transportation Needs: Not on file   Physical Activity: Inactive    Days of Exercise per Week: 0 days    Minutes of Exercise per Session: 0 min   Stress: Not on file   Social Connections: Not on file   Intimate Partner Violence: Not on file   Housing Stability: Not on file       SurgicalHistory  Past Surgical History:   Procedure Laterality Date    BLEPHAROPLASTY  5/10/2012    COLONOSCOPY  8/1/2007    JOINT REPLACEMENT         Physical Exam  Physical Exam  Constitutional:       Appearance: Normal appearance. HENT:      Head: Normocephalic and atraumatic. Eyes:      Conjunctiva/sclera: Conjunctivae normal.      Pupils: Pupils are equal, round, and reactive to light. Cardiovascular:      Rate and Rhythm: Normal rate and regular rhythm. Pulses: Normal pulses. Heart sounds: Normal heart sounds. No murmur heard. No friction rub. Pulmonary:      Effort: Pulmonary effort is normal.      Breath sounds: Normal breath sounds. No wheezing, rhonchi or rales. Skin:     General: Skin is warm and dry. Capillary Refill: Capillary refill takes less than 2 seconds. Findings: Rash present. Rash is urticarial.      Comments: Bilateral upper extremities, neck, abdomen, legs   Neurological:      General: No focal deficit present. Mental Status: She is alert and oriented to person, place, and time. Comments: Tremor   Psychiatric:         Mood and Affect: Mood normal.         Behavior: Behavior normal.         Thought Content:  Thought content normal.         Judgment: Judgment normal.         Assessment and Plan    Hives, Urticaria  -Given prednisone taper  -Kenalog cream  -referral to [address] [place], [place] [address]P-C  1/13/2023

## 2023-01-20 RX ORDER — AMLODIPINE BESYLATE 5 MG/1
TABLET ORAL
Qty: 90 TABLET | Refills: 0 | Status: SHIPPED | OUTPATIENT
Start: 2023-01-20

## 2023-01-20 RX ORDER — VALSARTAN AND HYDROCHLOROTHIAZIDE 160; 25 MG/1; MG/1
1 TABLET ORAL DAILY
Qty: 90 TABLET | Refills: 0 | Status: SHIPPED | OUTPATIENT
Start: 2023-01-20

## 2023-01-20 RX ORDER — PIOGLITAZONEHYDROCHLORIDE 30 MG/1
TABLET ORAL
Qty: 90 TABLET | Refills: 0 | Status: SHIPPED | OUTPATIENT
Start: 2023-01-20

## 2023-01-23 DIAGNOSIS — L50.9 HIVES: Primary | ICD-10-CM

## 2023-01-24 ENCOUNTER — TELEPHONE (OUTPATIENT)
Dept: INTERNAL MEDICINE CLINIC | Age: 78
End: 2023-01-24

## 2023-01-24 NOTE — TELEPHONE ENCOUNTER
----- Message from Armandina Barthel sent at 1/24/2023  4:37 PM EST -----  Contact: Patient [phone]  Okay per Dr. Radha Sneed  ----- Message -----  From: Ashly Lees  Sent: 1/24/2023   3:27 PM EST  To: Mervat Fields MD    Patient is requesting a referral for the allergist below that accepts her insurance. Please advise.       Dr. Kelsey Al M.D. [facility]  [address] [place] [address]  Main: [phone]  Fax: [phone]

## 2023-01-27 RX ORDER — GLIMEPIRIDE 4 MG/1
TABLET ORAL
Qty: 90 TABLET | Refills: 3 | Status: SHIPPED | OUTPATIENT
Start: 2023-01-27

## 2023-01-27 RX ORDER — PRAVASTATIN SODIUM 40 MG
TABLET ORAL
Qty: 90 TABLET | Refills: 3 | Status: SHIPPED | OUTPATIENT
Start: 2023-01-27

## 2023-03-13 ENCOUNTER — OFFICE VISIT (OUTPATIENT)
Dept: INTERNAL MEDICINE CLINIC | Age: 78
End: 2023-03-13

## 2023-03-13 VITALS
DIASTOLIC BLOOD PRESSURE: 80 MMHG | WEIGHT: 228 LBS | RESPIRATION RATE: 12 BRPM | HEIGHT: 61 IN | HEART RATE: 70 BPM | SYSTOLIC BLOOD PRESSURE: 120 MMHG | BODY MASS INDEX: 43.05 KG/M2

## 2023-03-13 DIAGNOSIS — E66.01 MORBID OBESITY WITH BMI OF 40.0-44.9, ADULT (HCC): ICD-10-CM

## 2023-03-13 DIAGNOSIS — E11.69 HYPERLIPIDEMIA ASSOCIATED WITH TYPE 2 DIABETES MELLITUS (HCC): ICD-10-CM

## 2023-03-13 DIAGNOSIS — E11.9 TYPE 2 DIABETES MELLITUS WITHOUT COMPLICATION, WITHOUT LONG-TERM CURRENT USE OF INSULIN (HCC): ICD-10-CM

## 2023-03-13 DIAGNOSIS — E78.5 HYPERLIPIDEMIA ASSOCIATED WITH TYPE 2 DIABETES MELLITUS (HCC): ICD-10-CM

## 2023-03-13 DIAGNOSIS — N18.31 STAGE 3A CHRONIC KIDNEY DISEASE (HCC): ICD-10-CM

## 2023-03-13 DIAGNOSIS — I10 BENIGN ESSENTIAL HTN: ICD-10-CM

## 2023-03-13 DIAGNOSIS — E11.9 TYPE 2 DIABETES MELLITUS WITHOUT COMPLICATION, WITHOUT LONG-TERM CURRENT USE OF INSULIN (HCC): Primary | ICD-10-CM

## 2023-03-13 DIAGNOSIS — F20.0 PARANOID TYPE SCHIZOPHRENIA (HCC): ICD-10-CM

## 2023-03-13 PROCEDURE — 1123F ACP DISCUSS/DSCN MKR DOCD: CPT | Performed by: INTERNAL MEDICINE

## 2023-03-13 PROCEDURE — 3079F DIAST BP 80-89 MM HG: CPT | Performed by: INTERNAL MEDICINE

## 2023-03-13 PROCEDURE — 3074F SYST BP LT 130 MM HG: CPT | Performed by: INTERNAL MEDICINE

## 2023-03-13 PROCEDURE — 99214 OFFICE O/P EST MOD 30 MIN: CPT | Performed by: INTERNAL MEDICINE

## 2023-03-13 ASSESSMENT — ENCOUNTER SYMPTOMS
RHINORRHEA: 0
WHEEZING: 0
NAUSEA: 0
SHORTNESS OF BREATH: 0
ABDOMINAL PAIN: 0
VOMITING: 0

## 2023-03-14 LAB
EST. AVERAGE GLUCOSE BLD GHB EST-MCNC: 137 MG/DL
HBA1C MFR BLD: 6.4 %

## 2023-04-24 RX ORDER — LANCETS
EACH MISCELLANEOUS
Qty: 100 EACH | Refills: 3 | Status: SHIPPED | OUTPATIENT
Start: 2023-04-24

## 2023-05-04 RX ORDER — AMLODIPINE BESYLATE 5 MG/1
TABLET ORAL
Qty: 90 TABLET | Refills: 0 | Status: SHIPPED | OUTPATIENT
Start: 2023-05-04

## 2023-05-04 RX ORDER — VALSARTAN AND HYDROCHLOROTHIAZIDE 160; 25 MG/1; MG/1
1 TABLET ORAL DAILY
Qty: 90 TABLET | Refills: 0 | Status: SHIPPED | OUTPATIENT
Start: 2023-05-04

## 2023-05-30 RX ORDER — BLOOD SUGAR DIAGNOSTIC
STRIP MISCELLANEOUS
Qty: 100 STRIP | Refills: 2 | Status: SHIPPED | OUTPATIENT
Start: 2023-05-30

## 2023-07-21 RX ORDER — AMLODIPINE BESYLATE 5 MG/1
TABLET ORAL
Qty: 90 TABLET | Refills: 0 | Status: SHIPPED | OUTPATIENT
Start: 2023-07-21

## 2023-07-21 RX ORDER — VALSARTAN AND HYDROCHLOROTHIAZIDE 160; 25 MG/1; MG/1
1 TABLET ORAL DAILY
Qty: 90 TABLET | Refills: 0 | Status: SHIPPED | OUTPATIENT
Start: 2023-07-21

## 2023-07-21 RX ORDER — PIOGLITAZONEHYDROCHLORIDE 30 MG/1
TABLET ORAL
Qty: 90 TABLET | Refills: 0 | Status: SHIPPED | OUTPATIENT
Start: 2023-07-21

## 2023-07-31 ENCOUNTER — OFFICE VISIT (OUTPATIENT)
Dept: INTERNAL MEDICINE CLINIC | Age: 78
End: 2023-07-31

## 2023-07-31 VITALS
SYSTOLIC BLOOD PRESSURE: 122 MMHG | HEART RATE: 70 BPM | RESPIRATION RATE: 12 BRPM | DIASTOLIC BLOOD PRESSURE: 80 MMHG | BODY MASS INDEX: 42.86 KG/M2 | HEIGHT: 61 IN | WEIGHT: 227 LBS

## 2023-07-31 DIAGNOSIS — E66.01 MORBID OBESITY WITH BMI OF 40.0-44.9, ADULT (HCC): ICD-10-CM

## 2023-07-31 DIAGNOSIS — E78.5 HYPERLIPIDEMIA ASSOCIATED WITH TYPE 2 DIABETES MELLITUS (HCC): ICD-10-CM

## 2023-07-31 DIAGNOSIS — I10 BENIGN ESSENTIAL HTN: ICD-10-CM

## 2023-07-31 DIAGNOSIS — E11.9 TYPE 2 DIABETES MELLITUS WITHOUT COMPLICATION, WITHOUT LONG-TERM CURRENT USE OF INSULIN (HCC): Primary | ICD-10-CM

## 2023-07-31 DIAGNOSIS — E11.69 HYPERLIPIDEMIA ASSOCIATED WITH TYPE 2 DIABETES MELLITUS (HCC): ICD-10-CM

## 2023-07-31 DIAGNOSIS — F20.0 PARANOID TYPE SCHIZOPHRENIA (HCC): ICD-10-CM

## 2023-07-31 DIAGNOSIS — E11.9 TYPE 2 DIABETES MELLITUS WITHOUT COMPLICATION, WITHOUT LONG-TERM CURRENT USE OF INSULIN (HCC): ICD-10-CM

## 2023-07-31 PROBLEM — R55 SYNCOPE AND COLLAPSE: Status: RESOLVED | Noted: 2021-07-20 | Resolved: 2023-07-31

## 2023-07-31 LAB
ALBUMIN SERPL-MCNC: 4.2 G/DL (ref 3.4–5)
ALBUMIN/GLOB SERPL: 1.7 {RATIO} (ref 1.1–2.2)
ALP SERPL-CCNC: 91 U/L (ref 40–129)
ALT SERPL-CCNC: 13 U/L (ref 10–40)
ANION GAP SERPL CALCULATED.3IONS-SCNC: 13 MMOL/L (ref 3–16)
AST SERPL-CCNC: 20 U/L (ref 15–37)
BASOPHILS # BLD: 0.1 K/UL (ref 0–0.2)
BASOPHILS NFR BLD: 0.9 %
BILIRUB SERPL-MCNC: 0.4 MG/DL (ref 0–1)
BILIRUBIN, POC: NORMAL
BLOOD URINE, POC: NORMAL
BUN SERPL-MCNC: 19 MG/DL (ref 7–20)
CALCIUM SERPL-MCNC: 10.1 MG/DL (ref 8.3–10.6)
CHLORIDE SERPL-SCNC: 101 MMOL/L (ref 99–110)
CHOLEST SERPL-MCNC: 152 MG/DL (ref 0–199)
CLARITY, POC: NORMAL
CO2 SERPL-SCNC: 28 MMOL/L (ref 21–32)
COLOR, POC: NORMAL
CREAT SERPL-MCNC: 1 MG/DL (ref 0.6–1.2)
CREAT UR-MCNC: 81.7 MG/DL (ref 28–259)
DEPRECATED RDW RBC AUTO: 15.7 % (ref 12.4–15.4)
EOSINOPHIL # BLD: 0.2 K/UL (ref 0–0.6)
EOSINOPHIL NFR BLD: 2.7 %
GFR SERPLBLD CREATININE-BSD FMLA CKD-EPI: 58 ML/MIN/{1.73_M2}
GLUCOSE SERPL-MCNC: 90 MG/DL (ref 70–99)
GLUCOSE URINE, POC: NORMAL
HCT VFR BLD AUTO: 40.4 % (ref 36–48)
HDLC SERPL-MCNC: 65 MG/DL (ref 40–60)
HGB BLD-MCNC: 13.2 G/DL (ref 12–16)
KETONES, POC: NORMAL
LDLC SERPL CALC-MCNC: 64 MG/DL
LEUKOCYTE EST, POC: NORMAL
LYMPHOCYTES # BLD: 1.3 K/UL (ref 1–5.1)
LYMPHOCYTES NFR BLD: 20 %
MCH RBC QN AUTO: 27.5 PG (ref 26–34)
MCHC RBC AUTO-ENTMCNC: 32.7 G/DL (ref 31–36)
MCV RBC AUTO: 84.3 FL (ref 80–100)
MICROALBUMIN UR DL<=1MG/L-MCNC: <1.2 MG/DL
MICROALBUMIN/CREAT UR: NORMAL MG/G (ref 0–30)
MONOCYTES # BLD: 0.5 K/UL (ref 0–1.3)
MONOCYTES NFR BLD: 8.1 %
NEUTROPHILS # BLD: 4.3 K/UL (ref 1.7–7.7)
NEUTROPHILS NFR BLD: 68.3 %
NITRITE, POC: NORMAL
PH, POC: NORMAL
PLATELET # BLD AUTO: 242 K/UL (ref 135–450)
PMV BLD AUTO: 8.8 FL (ref 5–10.5)
POTASSIUM SERPL-SCNC: 4.2 MMOL/L (ref 3.5–5.1)
PROT SERPL-MCNC: 6.7 G/DL (ref 6.4–8.2)
PROTEIN, POC: NORMAL
RBC # BLD AUTO: 4.79 M/UL (ref 4–5.2)
SODIUM SERPL-SCNC: 142 MMOL/L (ref 136–145)
SPECIFIC GRAVITY, POC: NORMAL
TRIGL SERPL-MCNC: 115 MG/DL (ref 0–150)
UROBILINOGEN, POC: NORMAL
VLDLC SERPL CALC-MCNC: 23 MG/DL
WBC # BLD AUTO: 6.3 K/UL (ref 4–11)

## 2023-07-31 PROCEDURE — 99214 OFFICE O/P EST MOD 30 MIN: CPT | Performed by: INTERNAL MEDICINE

## 2023-07-31 PROCEDURE — 3044F HG A1C LEVEL LT 7.0%: CPT | Performed by: INTERNAL MEDICINE

## 2023-07-31 PROCEDURE — 3074F SYST BP LT 130 MM HG: CPT | Performed by: INTERNAL MEDICINE

## 2023-07-31 PROCEDURE — 81002 URINALYSIS NONAUTO W/O SCOPE: CPT | Performed by: INTERNAL MEDICINE

## 2023-07-31 PROCEDURE — 1123F ACP DISCUSS/DSCN MKR DOCD: CPT | Performed by: INTERNAL MEDICINE

## 2023-07-31 PROCEDURE — 3079F DIAST BP 80-89 MM HG: CPT | Performed by: INTERNAL MEDICINE

## 2023-07-31 ASSESSMENT — ENCOUNTER SYMPTOMS
SHORTNESS OF BREATH: 0
WHEEZING: 0
NAUSEA: 0
ABDOMINAL PAIN: 0
RHINORRHEA: 0
VOMITING: 0

## 2023-08-01 LAB
EST. AVERAGE GLUCOSE BLD GHB EST-MCNC: 128.4 MG/DL
HBA1C MFR BLD: 6.1 %

## 2023-09-25 RX ORDER — VALSARTAN AND HYDROCHLOROTHIAZIDE 160; 25 MG/1; MG/1
1 TABLET ORAL DAILY
Qty: 90 TABLET | Refills: 1 | Status: SHIPPED | OUTPATIENT
Start: 2023-09-25

## 2023-09-25 RX ORDER — PIOGLITAZONEHYDROCHLORIDE 30 MG/1
TABLET ORAL
Qty: 90 TABLET | Refills: 1 | Status: SHIPPED | OUTPATIENT
Start: 2023-09-25

## 2023-09-25 RX ORDER — AMLODIPINE BESYLATE 5 MG/1
TABLET ORAL
Qty: 90 TABLET | Refills: 1 | Status: SHIPPED | OUTPATIENT
Start: 2023-09-25

## 2023-10-19 RX ORDER — LANCETS
EACH MISCELLANEOUS
Qty: 200 EACH | Refills: 3 | Status: SHIPPED | OUTPATIENT
Start: 2023-10-19

## 2023-10-23 RX ORDER — BLOOD SUGAR DIAGNOSTIC
STRIP MISCELLANEOUS
Qty: 100 STRIP | Refills: 2 | Status: SHIPPED | OUTPATIENT
Start: 2023-10-23

## 2023-11-05 ENCOUNTER — OFFICE VISIT (OUTPATIENT)
Dept: URGENT CARE | Age: 78
End: 2023-11-05

## 2023-11-05 VITALS
TEMPERATURE: 97.8 F | HEART RATE: 84 BPM | WEIGHT: 229 LBS | OXYGEN SATURATION: 94 % | SYSTOLIC BLOOD PRESSURE: 122 MMHG | DIASTOLIC BLOOD PRESSURE: 76 MMHG | BODY MASS INDEX: 43.27 KG/M2

## 2023-11-05 DIAGNOSIS — R06.02 SHORTNESS OF BREATH: ICD-10-CM

## 2023-11-05 DIAGNOSIS — J06.9 VIRAL UPPER RESPIRATORY TRACT INFECTION: Primary | ICD-10-CM

## 2023-11-05 DIAGNOSIS — R06.2 WHEEZING: ICD-10-CM

## 2023-11-05 DIAGNOSIS — R09.81 NASAL CONGESTION: ICD-10-CM

## 2023-11-05 DIAGNOSIS — R05.1 ACUTE COUGH: ICD-10-CM

## 2023-11-05 LAB
Lab: NORMAL
QC PASS/FAIL: NORMAL
SARS-COV-2 RDRP RESP QL NAA+PROBE: NEGATIVE

## 2023-11-05 RX ORDER — AZITHROMYCIN 250 MG/1
250 TABLET, FILM COATED ORAL SEE ADMIN INSTRUCTIONS
Qty: 6 TABLET | Refills: 0 | Status: SHIPPED | OUTPATIENT
Start: 2023-11-05 | End: 2023-11-10

## 2023-11-05 RX ORDER — PREDNISONE 10 MG/1
10 TABLET ORAL 2 TIMES DAILY
Qty: 10 TABLET | Refills: 0 | Status: SHIPPED | OUTPATIENT
Start: 2023-11-05 | End: 2023-11-10

## 2023-11-05 ASSESSMENT — ENCOUNTER SYMPTOMS
VOMITING: 0
COUGH: 1
ABDOMINAL PAIN: 0
SHORTNESS OF BREATH: 1
SORE THROAT: 1
RHINORRHEA: 1
DIARRHEA: 0
NAUSEA: 0

## 2023-11-06 ENCOUNTER — TELEPHONE (OUTPATIENT)
Dept: INTERNAL MEDICINE CLINIC | Age: 78
End: 2023-11-06

## 2023-11-06 RX ORDER — BLOOD SUGAR DIAGNOSTIC
STRIP MISCELLANEOUS
Qty: 100 STRIP | Refills: 2 | Status: SHIPPED | OUTPATIENT
Start: 2023-11-06

## 2023-11-13 ENCOUNTER — TELEPHONE (OUTPATIENT)
Dept: INTERNAL MEDICINE CLINIC | Age: 78
End: 2023-11-13

## 2023-11-13 DIAGNOSIS — H91.90 HEARING LOSS, UNSPECIFIED HEARING LOSS TYPE, UNSPECIFIED LATERALITY: Primary | ICD-10-CM

## 2023-11-13 NOTE — TELEPHONE ENCOUNTER
----- Message from Anthony Alonzo MD sent at 11/13/2023  1:44 PM EST -----  Contact: Pt [phone]  See Basilia Mazariegos   ----- Message -----  From: Horacio Yee  Sent: 11/13/2023   8:18 AM EST  To: Anthony Alonzo MD    Pt is requesting an ENT due to a sudden change in hearing due to a virus she had. Please advise.

## 2023-11-22 NOTE — PROGRESS NOTES
Darin Mcfadden   [birth year], 66 y.o. female   [id number]       Referring Provider: Omero Mak DO  Referral Type: In an order in [address] [place] [address]    Reason for Visit: Evaluation of the cause of disorders of hearing, tinnitus, or balance. ADULT AUDIOLOGIC EVALUATION      Darin Mcfadden is a 66 y.o. female seen today, 11/24/2023 , for an initial audiologic evaluation. Patient was seen by Omero Mak DO following today's evaluation. Patient was accompanied by Nona Fairbanks. AUDIOLOGIC AND OTHER PERTINENT MEDICAL HISTORY:      Darin Mcfadden noted tinnitus and imbalance. Patient reports sudden decline in hearing, bilaterally, on November 1st after a viral infection. Patient had an audiologic evaluation completed in June 2023. She noted constant bilateral tinnitus. Patient mentioned an imbalance sensation especially when standing. Darin Mcfadden denied otalgia, aural fullness, history of occupational/recreational noise exposure, history of head trauma, history of ear surgery, and family history of hearing loss. Date: 11/24/2023     IMPRESSIONS:      Abnormal middle ear pressure and compliance, bilaterally. Abnormal hearing sensitivity, bilaterally, which can affect every day listening needs. Word understanding was excellent presented at elevated sensation levels, bilaterally. Hearing aids are recommended at this time. Follow medical recommendations of Mendez Jenkins DO.     ASSESSMENT AND FINDINGS:     Otoscopy revealed: Clear ear canals bilaterally    RIGHT EAR:  Hearing Sensitivity: Mild to a severe mixed hearing loss from 250-8000 Hz  Speech Recognition Threshold: 45 dB HL  Word Recognition:Excellent (100%), based on NU-6 25-word list at 80 dBHL using recorded speech stimuli. Tympanometry: Flat, no peak pressure or compliance, Type B tympanogram, with typical ear canal volume, consistent with middle ear fluid.       LEFT EAR:  Hearing Sensitivity: Moderate to a profound sensorineural

## 2023-11-22 NOTE — PROGRESS NOTES
[facility]    Patient Name: Gabriel Colón  Medical Record Number:  [id number]  Primary Care Physician:  Beth Daniels MD  Date of Consultation: 11/24/2023    Chief Complaint:   Chief Complaint   Patient presents with    Hearing Problem     Hearing loss both ears since around 11/1 urgent care stated there is fluid in ear, patient started anti biotics and steroids and then they got dizzy the next day. Patient kept taken medicine. HISTORY OF PRESENT ILLNESS  Kaushik Boyer is a(n) 66 y.o. female who presents for evaluation of hearing loss. She has a baseline hearing loss that was last tested in June 2023. At the beginning in November she got a sinus infection and was told that she had fluid in her ears. She was started on antibiotics and steroids and has been having difficulty with hearing since then. The antibiotics and steroids also caused her to be dizzy for 1 day.   Patient Active Problem List   Diagnosis    Benign essential HTN    Osteoarthrosis involving lower leg    Paranoid type schizophrenia ([address])    Achilles tendinitis    Type 2 diabetes mellitus without complication ([address])    Hyperlipidemia associated with type 2 diabetes mellitus ([address])    Morbid obesity with BMI of 40.0-44.9, adult ([address])    Anterior dislocation of right shoulder    Closed displaced fracture of greater tuberosity of right humerus     Past Surgical History:   Procedure Laterality Date    BLEPHAROPLASTY  5/10/2012    COLONOSCOPY  8/1/2007    JOINT REPLACEMENT       Family History   Problem Relation Age of Onset    Cancer Sister         Ovarian cancer    Stroke Sister     Dementia Brother      Social History     Socioeconomic History    Marital status:      Spouse name: Not on file    Number of children: Not on file    Years of education: Not on file    Highest education level: Not on file   Occupational History    Not on file   Tobacco Use    Smoking status:

## 2023-11-24 ENCOUNTER — PROCEDURE VISIT (OUTPATIENT)
Dept: AUDIOLOGY | Age: 78
End: 2023-11-24

## 2023-11-24 ENCOUNTER — OFFICE VISIT (OUTPATIENT)
Dept: ENT CLINIC | Age: 78
End: 2023-11-24
Payer: MEDICARE

## 2023-11-24 VITALS
WEIGHT: 229 LBS | DIASTOLIC BLOOD PRESSURE: 80 MMHG | HEART RATE: 63 BPM | HEIGHT: 61 IN | RESPIRATION RATE: 16 BRPM | SYSTOLIC BLOOD PRESSURE: 138 MMHG | BODY MASS INDEX: 43.23 KG/M2

## 2023-11-24 DIAGNOSIS — R42 DIZZINESS AND GIDDINESS: ICD-10-CM

## 2023-11-24 DIAGNOSIS — H90.6 MIXED CONDUCTIVE AND SENSORINEURAL HEARING LOSS OF BOTH EARS: Primary | ICD-10-CM

## 2023-11-24 DIAGNOSIS — H90.A31 MIXED CONDUCTIVE AND SENSORINEURAL HEARING LOSS OF RIGHT EAR WITH RESTRICTED HEARING OF LEFT EAR: Primary | ICD-10-CM

## 2023-11-24 DIAGNOSIS — H90.A22 SENSORINEURAL HEARING LOSS (SNHL) OF LEFT EAR WITH RESTRICTED HEARING OF RIGHT EAR: ICD-10-CM

## 2023-11-24 DIAGNOSIS — H65.91 RIGHT OTITIS MEDIA WITH EFFUSION: ICD-10-CM

## 2023-11-24 DIAGNOSIS — H93.13 TINNITUS OF BOTH EARS: ICD-10-CM

## 2023-11-24 PROCEDURE — 1123F ACP DISCUSS/DSCN MKR DOCD: CPT | Performed by: STUDENT IN AN ORGANIZED HEALTH CARE EDUCATION/TRAINING PROGRAM

## 2023-11-24 PROCEDURE — 3079F DIAST BP 80-89 MM HG: CPT | Performed by: STUDENT IN AN ORGANIZED HEALTH CARE EDUCATION/TRAINING PROGRAM

## 2023-11-24 PROCEDURE — 3075F SYST BP GE 130 - 139MM HG: CPT | Performed by: STUDENT IN AN ORGANIZED HEALTH CARE EDUCATION/TRAINING PROGRAM

## 2023-11-24 PROCEDURE — 69420 INCISION OF EARDRUM: CPT | Performed by: STUDENT IN AN ORGANIZED HEALTH CARE EDUCATION/TRAINING PROGRAM

## 2023-11-24 PROCEDURE — 99204 OFFICE O/P NEW MOD 45 MIN: CPT | Performed by: STUDENT IN AN ORGANIZED HEALTH CARE EDUCATION/TRAINING PROGRAM

## 2023-11-24 ASSESSMENT — ENCOUNTER SYMPTOMS
EYE PAIN: 0
RHINORRHEA: 0
COUGH: 0
VOMITING: 0
NAUSEA: 0
SHORTNESS OF BREATH: 0

## 2023-12-04 ENCOUNTER — OFFICE VISIT (OUTPATIENT)
Dept: INTERNAL MEDICINE CLINIC | Age: 78
End: 2023-12-04

## 2023-12-04 VITALS
HEART RATE: 70 BPM | WEIGHT: 227 LBS | BODY MASS INDEX: 42.86 KG/M2 | HEIGHT: 61 IN | DIASTOLIC BLOOD PRESSURE: 80 MMHG | RESPIRATION RATE: 12 BRPM | SYSTOLIC BLOOD PRESSURE: 110 MMHG

## 2023-12-04 DIAGNOSIS — I10 BENIGN ESSENTIAL HTN: ICD-10-CM

## 2023-12-04 DIAGNOSIS — E11.9 TYPE 2 DIABETES MELLITUS WITHOUT COMPLICATION, WITHOUT LONG-TERM CURRENT USE OF INSULIN (HCC): ICD-10-CM

## 2023-12-04 DIAGNOSIS — F20.0 PARANOID TYPE SCHIZOPHRENIA (HCC): ICD-10-CM

## 2023-12-04 DIAGNOSIS — E78.5 HYPERLIPIDEMIA ASSOCIATED WITH TYPE 2 DIABETES MELLITUS (HCC): ICD-10-CM

## 2023-12-04 DIAGNOSIS — E66.01 MORBID OBESITY WITH BMI OF 40.0-44.9, ADULT (HCC): ICD-10-CM

## 2023-12-04 DIAGNOSIS — E11.69 HYPERLIPIDEMIA ASSOCIATED WITH TYPE 2 DIABETES MELLITUS (HCC): ICD-10-CM

## 2023-12-04 DIAGNOSIS — Z00.00 MEDICARE ANNUAL WELLNESS VISIT, SUBSEQUENT: Primary | ICD-10-CM

## 2023-12-04 DIAGNOSIS — H91.90 HEARING LOSS, UNSPECIFIED HEARING LOSS TYPE, UNSPECIFIED LATERALITY: ICD-10-CM

## 2023-12-04 DIAGNOSIS — M81.0 AGE-RELATED OSTEOPOROSIS WITHOUT CURRENT PATHOLOGICAL FRACTURE: ICD-10-CM

## 2023-12-04 LAB
BASOPHILS # BLD: 0.1 K/UL (ref 0–0.2)
BASOPHILS NFR BLD: 1.4 %
DEPRECATED RDW RBC AUTO: 17.1 % (ref 12.4–15.4)
EOSINOPHIL # BLD: 0.3 K/UL (ref 0–0.6)
EOSINOPHIL NFR BLD: 3.9 %
HCT VFR BLD AUTO: 40.7 % (ref 36–48)
HGB BLD-MCNC: 13.2 G/DL (ref 12–16)
LYMPHOCYTES # BLD: 1.7 K/UL (ref 1–5.1)
LYMPHOCYTES NFR BLD: 25.9 %
MCH RBC QN AUTO: 27.8 PG (ref 26–34)
MCHC RBC AUTO-ENTMCNC: 32.5 G/DL (ref 31–36)
MCV RBC AUTO: 85.4 FL (ref 80–100)
MONOCYTES # BLD: 0.6 K/UL (ref 0–1.3)
MONOCYTES NFR BLD: 8.9 %
NEUTROPHILS # BLD: 3.9 K/UL (ref 1.7–7.7)
NEUTROPHILS NFR BLD: 59.9 %
PLATELET # BLD AUTO: 324 K/UL (ref 135–450)
PMV BLD AUTO: 8.4 FL (ref 5–10.5)
RBC # BLD AUTO: 4.76 M/UL (ref 4–5.2)
WBC # BLD AUTO: 6.5 K/UL (ref 4–11)

## 2023-12-04 PROCEDURE — G0439 PPPS, SUBSEQ VISIT: HCPCS | Performed by: INTERNAL MEDICINE

## 2023-12-04 PROCEDURE — 1123F ACP DISCUSS/DSCN MKR DOCD: CPT | Performed by: INTERNAL MEDICINE

## 2023-12-04 PROCEDURE — 3074F SYST BP LT 130 MM HG: CPT | Performed by: INTERNAL MEDICINE

## 2023-12-04 PROCEDURE — 3044F HG A1C LEVEL LT 7.0%: CPT | Performed by: INTERNAL MEDICINE

## 2023-12-04 PROCEDURE — 3079F DIAST BP 80-89 MM HG: CPT | Performed by: INTERNAL MEDICINE

## 2023-12-04 RX ORDER — RISPERIDONE 0.5 MG/1
0.5 TABLET ORAL DAILY
COMMUNITY

## 2023-12-04 RX ORDER — VALSARTAN AND HYDROCHLOROTHIAZIDE 160; 25 MG/1; MG/1
1 TABLET ORAL DAILY
Qty: 90 TABLET | Refills: 1 | Status: SHIPPED | OUTPATIENT
Start: 2023-12-04

## 2023-12-04 RX ORDER — PRAVASTATIN SODIUM 40 MG
40 TABLET ORAL DAILY
Qty: 90 TABLET | Refills: 3 | Status: SHIPPED | OUTPATIENT
Start: 2023-12-04

## 2023-12-04 RX ORDER — GLIMEPIRIDE 2 MG/1
2 TABLET ORAL EVERY MORNING
Qty: 90 TABLET | Refills: 1 | Status: SHIPPED | OUTPATIENT
Start: 2023-12-04

## 2023-12-04 RX ORDER — AMLODIPINE BESYLATE 5 MG/1
5 TABLET ORAL DAILY
Qty: 90 TABLET | Refills: 1 | Status: SHIPPED | OUTPATIENT
Start: 2023-12-04

## 2023-12-04 ASSESSMENT — PATIENT HEALTH QUESTIONNAIRE - PHQ9
SUM OF ALL RESPONSES TO PHQ QUESTIONS 1-9: 0
SUM OF ALL RESPONSES TO PHQ QUESTIONS 1-9: 0
2. FEELING DOWN, DEPRESSED OR HOPELESS: 0
SUM OF ALL RESPONSES TO PHQ9 QUESTIONS 1 & 2: 0
SUM OF ALL RESPONSES TO PHQ QUESTIONS 1-9: 0
SUM OF ALL RESPONSES TO PHQ QUESTIONS 1-9: 0
1. LITTLE INTEREST OR PLEASURE IN DOING THINGS: 0

## 2023-12-04 NOTE — PATIENT INSTRUCTIONS
that may run in your family, and various assessments and screenings as appropriate. After reviewing your medical record and screening and assessments performed today your provider may have ordered immunizations, labs, imaging, and/or referrals for you. A list of these orders (if applicable) as well as your Preventive Care list are included within your After Visit Summary for your review. Other Preventive Recommendations:    A preventive eye exam performed by an eye specialist is recommended every 1-2 years to screen for glaucoma; cataracts, macular degeneration, and other eye disorders. A preventive dental visit is recommended every 6 months. Try to get at least 150 minutes of exercise per week or 10,000 steps per day on a pedometer . Order or download the FREE \"Exercise & Physical Activity: Your Everyday Guide\" from [employer]. Call 3-803.921.6680 or search [employer] online. You need 1143-4477 mg of calcium and 5373-7056 IU of vitamin D per day. It is possible to meet your calcium requirement with diet alone, but a vitamin D supplement is usually necessary to meet this goal.  When exposed to the sun, use a sunscreen that protects against both UVA and UVB radiation with an SPF of 30 or greater. Reapply every 2 to 3 hours or after sweating, drying off with a towel, or swimming. Always wear a seat belt when traveling in a car. Always wear a helmet when riding a bicycle or motorcycle.

## 2023-12-05 LAB
ALBUMIN SERPL-MCNC: 4.4 G/DL (ref 3.4–5)
ALBUMIN/GLOB SERPL: 1.7 {RATIO} (ref 1.1–2.2)
ALP SERPL-CCNC: 94 U/L (ref 40–129)
ALT SERPL-CCNC: 16 U/L (ref 10–40)
ANION GAP SERPL CALCULATED.3IONS-SCNC: 9 MMOL/L (ref 3–16)
AST SERPL-CCNC: 24 U/L (ref 15–37)
BILIRUB SERPL-MCNC: <0.2 MG/DL (ref 0–1)
BUN SERPL-MCNC: 16 MG/DL (ref 7–20)
CALCIUM SERPL-MCNC: 9.5 MG/DL (ref 8.3–10.6)
CHLORIDE SERPL-SCNC: 99 MMOL/L (ref 99–110)
CO2 SERPL-SCNC: 31 MMOL/L (ref 21–32)
CREAT SERPL-MCNC: 0.9 MG/DL (ref 0.6–1.2)
EST. AVERAGE GLUCOSE BLD GHB EST-MCNC: 134.1 MG/DL
GFR SERPLBLD CREATININE-BSD FMLA CKD-EPI: >60 ML/MIN/{1.73_M2}
GLUCOSE SERPL-MCNC: 98 MG/DL (ref 70–99)
HBA1C MFR BLD: 6.3 %
POTASSIUM SERPL-SCNC: 4.2 MMOL/L (ref 3.5–5.1)
PROT SERPL-MCNC: 7 G/DL (ref 6.4–8.2)
SODIUM SERPL-SCNC: 139 MMOL/L (ref 136–145)
URATE SERPL-MCNC: 5.8 MG/DL (ref 2.6–6)

## 2023-12-15 ENCOUNTER — OFFICE VISIT (OUTPATIENT)
Dept: ENT CLINIC | Age: 78
End: 2023-12-15
Payer: MEDICARE

## 2023-12-15 VITALS — HEIGHT: 61 IN | WEIGHT: 226 LBS | BODY MASS INDEX: 42.67 KG/M2 | RESPIRATION RATE: 16 BRPM

## 2023-12-15 DIAGNOSIS — H65.91 RIGHT OTITIS MEDIA WITH EFFUSION: Primary | ICD-10-CM

## 2023-12-15 DIAGNOSIS — H90.3 SENSORINEURAL HEARING LOSS (SNHL) OF BOTH EARS: ICD-10-CM

## 2023-12-15 PROCEDURE — 99213 OFFICE O/P EST LOW 20 MIN: CPT | Performed by: STUDENT IN AN ORGANIZED HEALTH CARE EDUCATION/TRAINING PROGRAM

## 2023-12-15 PROCEDURE — 1123F ACP DISCUSS/DSCN MKR DOCD: CPT | Performed by: STUDENT IN AN ORGANIZED HEALTH CARE EDUCATION/TRAINING PROGRAM

## 2023-12-26 RX ORDER — PIOGLITAZONEHYDROCHLORIDE 30 MG/1
TABLET ORAL
Qty: 100 TABLET | Refills: 2 | Status: SHIPPED | OUTPATIENT
Start: 2023-12-26

## 2024-03-07 RX ORDER — GLIMEPIRIDE 2 MG/1
2 TABLET ORAL EVERY MORNING
Qty: 90 TABLET | Refills: 0 | Status: SHIPPED | OUTPATIENT
Start: 2024-03-07

## 2024-03-18 ENCOUNTER — OFFICE VISIT (OUTPATIENT)
Dept: INTERNAL MEDICINE CLINIC | Age: 79
End: 2024-03-18

## 2024-03-18 VITALS
HEIGHT: 61 IN | WEIGHT: 226 LBS | RESPIRATION RATE: 12 BRPM | BODY MASS INDEX: 42.67 KG/M2 | SYSTOLIC BLOOD PRESSURE: 120 MMHG | DIASTOLIC BLOOD PRESSURE: 80 MMHG | HEART RATE: 70 BPM

## 2024-03-18 DIAGNOSIS — E78.5 HYPERLIPIDEMIA ASSOCIATED WITH TYPE 2 DIABETES MELLITUS (HCC): ICD-10-CM

## 2024-03-18 DIAGNOSIS — I10 BENIGN ESSENTIAL HTN: ICD-10-CM

## 2024-03-18 DIAGNOSIS — E11.69 HYPERLIPIDEMIA ASSOCIATED WITH TYPE 2 DIABETES MELLITUS (HCC): ICD-10-CM

## 2024-03-18 DIAGNOSIS — E66.01 OBESITY, CLASS III, BMI 40-49.9 (MORBID OBESITY) (HCC): ICD-10-CM

## 2024-03-18 DIAGNOSIS — E11.9 TYPE 2 DIABETES MELLITUS WITHOUT COMPLICATION, WITHOUT LONG-TERM CURRENT USE OF INSULIN (HCC): Primary | ICD-10-CM

## 2024-03-18 DIAGNOSIS — F20.0 PARANOID TYPE SCHIZOPHRENIA (HCC): ICD-10-CM

## 2024-03-18 PROCEDURE — 3079F DIAST BP 80-89 MM HG: CPT | Performed by: INTERNAL MEDICINE

## 2024-03-18 PROCEDURE — 3074F SYST BP LT 130 MM HG: CPT | Performed by: INTERNAL MEDICINE

## 2024-03-18 PROCEDURE — 99214 OFFICE O/P EST MOD 30 MIN: CPT | Performed by: INTERNAL MEDICINE

## 2024-03-18 PROCEDURE — 1123F ACP DISCUSS/DSCN MKR DOCD: CPT | Performed by: INTERNAL MEDICINE

## 2024-03-18 RX ORDER — BLOOD SUGAR DIAGNOSTIC
STRIP MISCELLANEOUS
Qty: 100 STRIP | Refills: 3 | Status: SHIPPED | OUTPATIENT
Start: 2024-03-18

## 2024-03-18 ASSESSMENT — PATIENT HEALTH QUESTIONNAIRE - PHQ9
SUM OF ALL RESPONSES TO PHQ QUESTIONS 1-9: 0
SUM OF ALL RESPONSES TO PHQ QUESTIONS 1-9: 0
SUM OF ALL RESPONSES TO PHQ9 QUESTIONS 1 & 2: 0
SUM OF ALL RESPONSES TO PHQ QUESTIONS 1-9: 0
1. LITTLE INTEREST OR PLEASURE IN DOING THINGS: NOT AT ALL
SUM OF ALL RESPONSES TO PHQ QUESTIONS 1-9: 0
2. FEELING DOWN, DEPRESSED OR HOPELESS: NOT AT ALL

## 2024-03-18 ASSESSMENT — ENCOUNTER SYMPTOMS
ABDOMINAL PAIN: 0
RHINORRHEA: 0
NAUSEA: 0
VOMITING: 0
SHORTNESS OF BREATH: 0
WHEEZING: 0

## 2024-03-18 NOTE — PROGRESS NOTES
Subjective:      Patient ID: Ashley Melara is a 78 y.o. female.    HPI  Patient is here for follow up of diabetes, hypertension, hyperlipidemia, OA, schizophrenia and heel pain.  Patient's BGs consistently in an acceptable range.  Patient does not have polydipsia and polyuria. She has been checking her sugar bid. Her sugars are ranging from  mg/dl. Her diabetes is well controlled. She takes amaryl. Rare hypoglycemia. She has had vision issues and has seen eye doctor. No numbness or tingling in her feet.   Patient's BP is controlled on current medications. No chest pain or dyspnea. Med compliant.  Her cholesterol is at goal. No myalgias.  She has mild edema.  She has schizophrenia. It is controlled.     Review of Systems   Constitutional:  Negative for appetite change and fatigue.   HENT:  Negative for postnasal drip and rhinorrhea.    Eyes:  Negative for visual disturbance.   Respiratory:  Negative for shortness of breath and wheezing.    Cardiovascular:  Negative for chest pain and palpitations.   Gastrointestinal:  Negative for abdominal pain, nausea and vomiting.   Endocrine: Negative for polydipsia, polyphagia and polyuria.   Musculoskeletal:  Negative for joint swelling.   Skin:  Negative for rash.   Neurological:  Negative for light-headedness and numbness.   Psychiatric/Behavioral:  Negative for sleep disturbance.          Current Outpatient Medications   Medication Instructions    Accu-Chek Softclix Lancets MISC USE TO TEST TWICE A DAY. DX:E11.9    acetaminophen (TYLENOL) 325 mg, Oral, PRN    amLODIPine (NORVASC) 5 mg, Oral, DAILY    Blood Glucose Monitoring Suppl (ACCU-CHEK NEY PLUS) w/Device KIT Check twice daily. DX: E11.9    blood glucose test strips (ACCU-CHEK NEY PLUS) strip USE TO TEST BLOOD SUGARS ONCE DAILY. DX: E11.9    busPIRone (BUSPAR) 10 mg, Oral, 2 TIMES DAILY    Calcium Carb-Cholecalciferol (CALCIUM PLUS VITAMIN D3) 600-800 MG-UNIT TABS Oral, 2 TIMES DAILY    glimepiride

## 2024-03-25 RX ORDER — BLOOD SUGAR DIAGNOSTIC
STRIP MISCELLANEOUS
Qty: 200 STRIP | Refills: 3 | Status: SHIPPED | OUTPATIENT
Start: 2024-03-25

## 2024-04-01 RX ORDER — BLOOD SUGAR DIAGNOSTIC
STRIP MISCELLANEOUS
Qty: 200 STRIP | Refills: 3 | Status: SHIPPED | OUTPATIENT
Start: 2024-04-01

## 2024-05-28 ENCOUNTER — TELEPHONE (OUTPATIENT)
Dept: INTERNAL MEDICINE CLINIC | Age: 79
End: 2024-05-28

## 2024-05-28 NOTE — TELEPHONE ENCOUNTER
----- Message from Han Fuentes MD sent at 5/28/2024 11:55 AM EDT -----  Contact: [phone]  Her DM is well controlled so it is ok to do it once in 6 months  ----- Message -----  From: Anayeli Sanz MA  Sent: 5/23/2024   1:09 PM EDT  To: Han Fuentes MD    Patient called and wants to know why an A1c was not ordered at her visit on March 18th? Her last one was done on December 4 of 2023. She just found the AVS and noticed that it said there was not one ordered and wants to know why. Please advise.       [facility] - [place], [place] - [address] [place] [address] -  [phone] - F [phone]  [address] [place] [id number]  Phone: [phone]  Fax: [phone]

## 2024-06-07 RX ORDER — AMLODIPINE BESYLATE 5 MG/1
5 TABLET ORAL DAILY
Qty: 90 TABLET | Refills: 1 | Status: SHIPPED | OUTPATIENT
Start: 2024-06-07

## 2024-06-07 RX ORDER — VALSARTAN AND HYDROCHLOROTHIAZIDE 160; 25 MG/1; MG/1
1 TABLET ORAL DAILY
Qty: 90 TABLET | Refills: 1 | Status: SHIPPED | OUTPATIENT
Start: 2024-06-07

## 2024-07-18 ENCOUNTER — OFFICE VISIT (OUTPATIENT)
Dept: INTERNAL MEDICINE CLINIC | Age: 79
End: 2024-07-18

## 2024-07-18 VITALS
WEIGHT: 228 LBS | HEART RATE: 74 BPM | RESPIRATION RATE: 12 BRPM | DIASTOLIC BLOOD PRESSURE: 80 MMHG | BODY MASS INDEX: 43.05 KG/M2 | HEIGHT: 61 IN | SYSTOLIC BLOOD PRESSURE: 120 MMHG

## 2024-07-18 DIAGNOSIS — E78.5 HYPERLIPIDEMIA ASSOCIATED WITH TYPE 2 DIABETES MELLITUS (HCC): ICD-10-CM

## 2024-07-18 DIAGNOSIS — M81.0 AGE-RELATED OSTEOPOROSIS WITHOUT CURRENT PATHOLOGICAL FRACTURE: ICD-10-CM

## 2024-07-18 DIAGNOSIS — I10 BENIGN ESSENTIAL HTN: ICD-10-CM

## 2024-07-18 DIAGNOSIS — E66.01 MORBID OBESITY WITH BMI OF 40.0-44.9, ADULT (HCC): ICD-10-CM

## 2024-07-18 DIAGNOSIS — E11.69 HYPERLIPIDEMIA ASSOCIATED WITH TYPE 2 DIABETES MELLITUS (HCC): ICD-10-CM

## 2024-07-18 DIAGNOSIS — E11.9 TYPE 2 DIABETES MELLITUS WITHOUT COMPLICATION, WITHOUT LONG-TERM CURRENT USE OF INSULIN (HCC): ICD-10-CM

## 2024-07-18 DIAGNOSIS — N18.31 STAGE 3A CHRONIC KIDNEY DISEASE (HCC): ICD-10-CM

## 2024-07-18 DIAGNOSIS — F20.0 PARANOID TYPE SCHIZOPHRENIA (HCC): ICD-10-CM

## 2024-07-18 DIAGNOSIS — E11.9 TYPE 2 DIABETES MELLITUS WITHOUT COMPLICATION, WITHOUT LONG-TERM CURRENT USE OF INSULIN (HCC): Primary | ICD-10-CM

## 2024-07-18 LAB
ALBUMIN SERPL-MCNC: 4.3 G/DL (ref 3.4–5)
ALBUMIN/GLOB SERPL: 1.6 {RATIO} (ref 1.1–2.2)
ALP SERPL-CCNC: 114 U/L (ref 40–129)
ALT SERPL-CCNC: 14 U/L (ref 10–40)
ANION GAP SERPL CALCULATED.3IONS-SCNC: 12 MMOL/L (ref 3–16)
AST SERPL-CCNC: 21 U/L (ref 15–37)
BASOPHILS # BLD: 0.1 K/UL (ref 0–0.2)
BASOPHILS NFR BLD: 1.6 %
BILIRUB SERPL-MCNC: 0.3 MG/DL (ref 0–1)
BUN SERPL-MCNC: 16 MG/DL (ref 7–20)
CALCIUM SERPL-MCNC: 9.9 MG/DL (ref 8.3–10.6)
CHLORIDE SERPL-SCNC: 99 MMOL/L (ref 99–110)
CHOLEST SERPL-MCNC: 149 MG/DL (ref 0–199)
CO2 SERPL-SCNC: 30 MMOL/L (ref 21–32)
CREAT SERPL-MCNC: 0.9 MG/DL (ref 0.6–1.2)
DEPRECATED RDW RBC AUTO: 16.5 % (ref 12.4–15.4)
EOSINOPHIL # BLD: 0.2 K/UL (ref 0–0.6)
EOSINOPHIL NFR BLD: 2.5 %
EST. AVERAGE GLUCOSE BLD GHB EST-MCNC: 131.2 MG/DL
GFR SERPLBLD CREATININE-BSD FMLA CKD-EPI: 65 ML/MIN/{1.73_M2}
GLUCOSE SERPL-MCNC: 92 MG/DL (ref 70–99)
HBA1C MFR BLD: 6.2 %
HCT VFR BLD AUTO: 39.9 % (ref 36–48)
HDLC SERPL-MCNC: 68 MG/DL (ref 40–60)
HGB BLD-MCNC: 13 G/DL (ref 12–16)
LDLC SERPL CALC-MCNC: 61 MG/DL
LYMPHOCYTES # BLD: 1.3 K/UL (ref 1–5.1)
LYMPHOCYTES NFR BLD: 18.2 %
MCH RBC QN AUTO: 27.2 PG (ref 26–34)
MCHC RBC AUTO-ENTMCNC: 32.5 G/DL (ref 31–36)
MCV RBC AUTO: 83.8 FL (ref 80–100)
MONOCYTES # BLD: 0.6 K/UL (ref 0–1.3)
MONOCYTES NFR BLD: 8.6 %
NEUTROPHILS # BLD: 5.1 K/UL (ref 1.7–7.7)
NEUTROPHILS NFR BLD: 69.1 %
PLATELET # BLD AUTO: 273 K/UL (ref 135–450)
PMV BLD AUTO: 8.9 FL (ref 5–10.5)
POTASSIUM SERPL-SCNC: 4.3 MMOL/L (ref 3.5–5.1)
PROT SERPL-MCNC: 7 G/DL (ref 6.4–8.2)
RBC # BLD AUTO: 4.77 M/UL (ref 4–5.2)
SODIUM SERPL-SCNC: 141 MMOL/L (ref 136–145)
TRIGL SERPL-MCNC: 99 MG/DL (ref 0–150)
TSH SERPL DL<=0.005 MIU/L-ACNC: 2.22 UIU/ML (ref 0.27–4.2)
URATE SERPL-MCNC: 5.6 MG/DL (ref 2.6–6)
VIT B12 SERPL-MCNC: 498 PG/ML (ref 211–911)
VLDLC SERPL CALC-MCNC: 20 MG/DL
WBC # BLD AUTO: 7.4 K/UL (ref 4–11)

## 2024-07-18 PROCEDURE — 3074F SYST BP LT 130 MM HG: CPT | Performed by: INTERNAL MEDICINE

## 2024-07-18 PROCEDURE — 99214 OFFICE O/P EST MOD 30 MIN: CPT | Performed by: INTERNAL MEDICINE

## 2024-07-18 PROCEDURE — 3079F DIAST BP 80-89 MM HG: CPT | Performed by: INTERNAL MEDICINE

## 2024-07-18 PROCEDURE — 81002 URINALYSIS NONAUTO W/O SCOPE: CPT | Performed by: INTERNAL MEDICINE

## 2024-07-18 PROCEDURE — 1123F ACP DISCUSS/DSCN MKR DOCD: CPT | Performed by: INTERNAL MEDICINE

## 2024-07-18 ASSESSMENT — ENCOUNTER SYMPTOMS
RHINORRHEA: 0
WHEEZING: 0
NAUSEA: 0
ABDOMINAL PAIN: 0
VOMITING: 0
SHORTNESS OF BREATH: 0

## 2024-07-18 NOTE — PROGRESS NOTES
voiced understanding.        Decrease calorie intake.  Exercise, weight loss recommended.  The current medical regimen is effective;  continue present plan and medications.  See orders.

## 2024-07-19 DIAGNOSIS — Z00.00 ROUTINE ADULT HEALTH MAINTENANCE: Primary | ICD-10-CM

## 2024-07-19 LAB
BILIRUBIN, POC: NORMAL
BLOOD URINE, POC: NORMAL
CLARITY, POC: NORMAL
COLOR, POC: NORMAL
CREAT UR-MCNC: 33.6 MG/DL (ref 28–259)
GLUCOSE URINE, POC: NORMAL
KETONES, POC: NORMAL
LEUKOCYTE EST, POC: NORMAL
MICROALBUMIN UR DL<=1MG/L-MCNC: <1.2 MG/DL
MICROALBUMIN/CREAT UR: NORMAL MG/G (ref 0–30)
NITRITE, POC: NORMAL
PH, POC: NORMAL
PROTEIN, POC: NORMAL
SPECIFIC GRAVITY, POC: NORMAL
UROBILINOGEN, POC: NORMAL

## 2024-07-21 LAB
BACTERIA UR CULT: ABNORMAL
BACTERIA UR CULT: ABNORMAL
ORGANISM: ABNORMAL

## 2024-07-22 LAB
BACTERIA UR CULT: ABNORMAL
BACTERIA UR CULT: ABNORMAL
ORGANISM: ABNORMAL

## 2024-07-29 RX ORDER — PRAVASTATIN SODIUM 40 MG
40 TABLET ORAL DAILY
Qty: 90 TABLET | Refills: 1 | Status: SHIPPED | OUTPATIENT
Start: 2024-07-29

## 2024-08-05 ENCOUNTER — TELEPHONE (OUTPATIENT)
Dept: INTERNAL MEDICINE CLINIC | Age: 79
End: 2024-08-05

## 2024-08-05 RX ORDER — VALSARTAN AND HYDROCHLOROTHIAZIDE 160; 25 MG/1; MG/1
1 TABLET ORAL DAILY
Qty: 100 TABLET | Refills: 1 | Status: SHIPPED | OUTPATIENT
Start: 2024-08-05

## 2024-08-05 RX ORDER — AMLODIPINE BESYLATE 5 MG/1
5 TABLET ORAL DAILY
Qty: 100 TABLET | Refills: 1 | Status: SHIPPED | OUTPATIENT
Start: 2024-08-05

## 2024-08-05 RX ORDER — CIPROFLOXACIN 250 MG/1
250 TABLET, FILM COATED ORAL 2 TIMES DAILY
Qty: 14 TABLET | Refills: 0 | Status: SHIPPED | OUTPATIENT
Start: 2024-08-05 | End: 2024-08-12

## 2024-08-05 RX ORDER — GLIMEPIRIDE 2 MG/1
2 TABLET ORAL EVERY MORNING
Qty: 90 TABLET | Refills: 1 | Status: SHIPPED | OUTPATIENT
Start: 2024-08-05

## 2024-08-05 NOTE — TELEPHONE ENCOUNTER
----- Message from Han Fuentes MD sent at 8/5/2024  1:23 PM EDT -----  Contact: [phone]  Cipro 250 mg po bid #14  ----- Message -----  From: Anayeli Sanz MA  Sent: 8/5/2024   8:29 AM EDT  To: Han Fuentes MD    Patient called and states that she had a urine culture done on 7/19 that showed E.Coli growth, but had no symptoms. She now has symptoms and is hoping to have that antibiotic called in. Her symptoms include feeling like she needs to pee but nothing, or very little, comes out when she tries to go, and she felt like she had a low grade fever last night. The symptoms started last night. Please advise.       [facility] in [place]

## 2024-09-30 ENCOUNTER — TELEPHONE (OUTPATIENT)
Dept: INTERNAL MEDICINE CLINIC | Age: 79
End: 2024-09-30

## 2024-09-30 NOTE — TELEPHONE ENCOUNTER
----- Message from Dr. Han Fuentes MD sent at 9/30/2024  9:29 AM EDT -----  Contact: patient [phone]  Ok to get flu and COVID same time. No need for RSV if she already had it.  ----- Message -----  From: Lorraine Casanova  Sent: 9/30/2024   9:20 AM EDT  To: Han Fuentes MD    Patient wanting to know if she should start getting her Flu, Covid, and RSV vaccines?  Patient states the past 2 years they have giving her all at one time, and she has had to be put on steroids.  Patient thought she would get the Flu first, 3 weeks later get the COVID, and not get the RSV.  Please advise

## 2024-10-01 RX ORDER — LANCETS
EACH MISCELLANEOUS
Qty: 100 EACH | Refills: 3 | Status: SHIPPED | OUTPATIENT
Start: 2024-10-01

## 2024-10-01 RX ORDER — GLIMEPIRIDE 2 MG/1
2 TABLET ORAL EVERY MORNING
Qty: 100 TABLET | Refills: 2 | Status: SHIPPED | OUTPATIENT
Start: 2024-10-01

## 2024-10-09 RX ORDER — PIOGLITAZONEHYDROCHLORIDE 30 MG/1
TABLET ORAL
Qty: 100 TABLET | Refills: 2 | Status: SHIPPED | OUTPATIENT
Start: 2024-10-09

## 2024-10-11 ENCOUNTER — HOSPITAL ENCOUNTER (OUTPATIENT)
Age: 79
Setting detail: OBSERVATION
Discharge: HOME HEALTH | End: 2024-10-12
Attending: STUDENT IN AN ORGANIZED HEALTH CARE EDUCATION/TRAINING PROGRAM | Admitting: FAMILY MEDICINE
Payer: MEDICARE

## 2024-10-11 ENCOUNTER — OFFICE VISIT (OUTPATIENT)
Dept: URGENT CARE | Age: 79
End: 2024-10-11

## 2024-10-11 ENCOUNTER — APPOINTMENT (OUTPATIENT)
Dept: CT IMAGING | Age: 79
End: 2024-10-11
Payer: MEDICARE

## 2024-10-11 ENCOUNTER — APPOINTMENT (OUTPATIENT)
Dept: GENERAL RADIOLOGY | Age: 79
End: 2024-10-11
Payer: MEDICARE

## 2024-10-11 VITALS
HEIGHT: 61 IN | TEMPERATURE: 97 F | HEART RATE: 70 BPM | WEIGHT: 228 LBS | OXYGEN SATURATION: 98 % | DIASTOLIC BLOOD PRESSURE: 75 MMHG | SYSTOLIC BLOOD PRESSURE: 132 MMHG | BODY MASS INDEX: 43.05 KG/M2

## 2024-10-11 DIAGNOSIS — H53.8 BLURRED VISION: ICD-10-CM

## 2024-10-11 DIAGNOSIS — R42 DIZZINESS: Primary | ICD-10-CM

## 2024-10-11 PROBLEM — R26.81 UNSTEADY GAIT: Status: ACTIVE | Noted: 2024-10-11

## 2024-10-11 LAB
ALBUMIN SERPL-MCNC: 4.4 G/DL (ref 3.4–5)
ALBUMIN/GLOB SERPL: 1.3 {RATIO} (ref 1.1–2.2)
ALP SERPL-CCNC: 122 U/L (ref 40–129)
ALT SERPL-CCNC: 18 U/L (ref 10–40)
ANION GAP SERPL CALCULATED.3IONS-SCNC: 10 MMOL/L (ref 3–16)
AST SERPL-CCNC: 27 U/L (ref 15–37)
BASOPHILS # BLD: 0.1 K/UL (ref 0–0.2)
BASOPHILS NFR BLD: 1.3 %
BILIRUB SERPL-MCNC: 0.3 MG/DL (ref 0–1)
BUN SERPL-MCNC: 21 MG/DL (ref 7–20)
CALCIUM SERPL-MCNC: 10 MG/DL (ref 8.3–10.6)
CHLORIDE SERPL-SCNC: 97 MMOL/L (ref 99–110)
CO2 SERPL-SCNC: 30 MMOL/L (ref 21–32)
CREAT SERPL-MCNC: 1.1 MG/DL (ref 0.6–1.2)
DEPRECATED RDW RBC AUTO: 16.3 % (ref 12.4–15.4)
EOSINOPHIL # BLD: 0.1 K/UL (ref 0–0.6)
EOSINOPHIL NFR BLD: 1.6 %
GFR SERPLBLD CREATININE-BSD FMLA CKD-EPI: 51 ML/MIN/{1.73_M2}
GLUCOSE BLD-MCNC: 103 MG/DL (ref 70–99)
GLUCOSE BLD-MCNC: 105 MG/DL (ref 70–99)
GLUCOSE BLD-MCNC: 121 MG/DL (ref 70–99)
GLUCOSE BLD-MCNC: 124 MG/DL (ref 70–99)
GLUCOSE BLD-MCNC: 83 MG/DL (ref 70–99)
GLUCOSE SERPL-MCNC: 126 MG/DL (ref 70–99)
HCT VFR BLD AUTO: 40.1 % (ref 36–48)
HGB BLD-MCNC: 13 G/DL (ref 12–16)
LYMPHOCYTES # BLD: 1.9 K/UL (ref 1–5.1)
LYMPHOCYTES NFR BLD: 26 %
MCH RBC QN AUTO: 27.1 PG (ref 26–34)
MCHC RBC AUTO-ENTMCNC: 32.5 G/DL (ref 31–36)
MCV RBC AUTO: 83.4 FL (ref 80–100)
MONOCYTES # BLD: 0.8 K/UL (ref 0–1.3)
MONOCYTES NFR BLD: 10.3 %
NEUTROPHILS # BLD: 4.5 K/UL (ref 1.7–7.7)
NEUTROPHILS NFR BLD: 60.8 %
NT-PROBNP SERPL-MCNC: 206 PG/ML (ref 0–449)
PERFORMED ON: ABNORMAL
PERFORMED ON: NORMAL
PLATELET # BLD AUTO: 295 K/UL (ref 135–450)
PMV BLD AUTO: 8.4 FL (ref 5–10.5)
POTASSIUM SERPL-SCNC: 3.7 MMOL/L (ref 3.5–5.1)
PROT SERPL-MCNC: 7.7 G/DL (ref 6.4–8.2)
RBC # BLD AUTO: 4.81 M/UL (ref 4–5.2)
SODIUM SERPL-SCNC: 137 MMOL/L (ref 136–145)
TROPONIN, HIGH SENSITIVITY: 9 NG/L (ref 0–14)
TROPONIN, HIGH SENSITIVITY: <6 NG/L (ref 0–14)
WBC # BLD AUTO: 7.4 K/UL (ref 4–11)

## 2024-10-11 PROCEDURE — G0378 HOSPITAL OBSERVATION PER HR: HCPCS

## 2024-10-11 PROCEDURE — 70498 CT ANGIOGRAPHY NECK: CPT

## 2024-10-11 PROCEDURE — 36415 COLL VENOUS BLD VENIPUNCTURE: CPT

## 2024-10-11 PROCEDURE — 83880 ASSAY OF NATRIURETIC PEPTIDE: CPT

## 2024-10-11 PROCEDURE — 85025 COMPLETE CBC W/AUTO DIFF WBC: CPT

## 2024-10-11 PROCEDURE — 84484 ASSAY OF TROPONIN QUANT: CPT

## 2024-10-11 PROCEDURE — 2580000003 HC RX 258: Performed by: FAMILY MEDICINE

## 2024-10-11 PROCEDURE — 71046 X-RAY EXAM CHEST 2 VIEWS: CPT

## 2024-10-11 PROCEDURE — 80053 COMPREHEN METABOLIC PANEL: CPT

## 2024-10-11 PROCEDURE — 6370000000 HC RX 637 (ALT 250 FOR IP): Performed by: FAMILY MEDICINE

## 2024-10-11 PROCEDURE — 93005 ELECTROCARDIOGRAM TRACING: CPT

## 2024-10-11 PROCEDURE — 99285 EMERGENCY DEPT VISIT HI MDM: CPT

## 2024-10-11 PROCEDURE — 6360000004 HC RX CONTRAST MEDICATION

## 2024-10-11 PROCEDURE — 6370000000 HC RX 637 (ALT 250 FOR IP): Performed by: NURSE PRACTITIONER

## 2024-10-11 PROCEDURE — 70450 CT HEAD/BRAIN W/O DYE: CPT

## 2024-10-11 RX ORDER — PRAVASTATIN SODIUM 40 MG
40 TABLET ORAL DAILY
Status: DISCONTINUED | OUTPATIENT
Start: 2024-10-12 | End: 2024-10-12 | Stop reason: HOSPADM

## 2024-10-11 RX ORDER — INSULIN LISPRO 100 [IU]/ML
0-8 INJECTION, SOLUTION INTRAVENOUS; SUBCUTANEOUS
Status: DISCONTINUED | OUTPATIENT
Start: 2024-10-11 | End: 2024-10-12 | Stop reason: HOSPADM

## 2024-10-11 RX ORDER — VALSARTAN 80 MG/1
160 TABLET ORAL DAILY
Status: DISCONTINUED | OUTPATIENT
Start: 2024-10-12 | End: 2024-10-12 | Stop reason: HOSPADM

## 2024-10-11 RX ORDER — AMLODIPINE BESYLATE 5 MG/1
5 TABLET ORAL DAILY
Status: DISCONTINUED | OUTPATIENT
Start: 2024-10-12 | End: 2024-10-12 | Stop reason: HOSPADM

## 2024-10-11 RX ORDER — IOPAMIDOL 755 MG/ML
75 INJECTION, SOLUTION INTRAVASCULAR
Status: COMPLETED | OUTPATIENT
Start: 2024-10-11 | End: 2024-10-11

## 2024-10-11 RX ORDER — SODIUM CHLORIDE 9 MG/ML
INJECTION, SOLUTION INTRAVENOUS PRN
Status: DISCONTINUED | OUTPATIENT
Start: 2024-10-11 | End: 2024-10-12 | Stop reason: HOSPADM

## 2024-10-11 RX ORDER — BUSPIRONE HYDROCHLORIDE 5 MG/1
10 TABLET ORAL 2 TIMES DAILY
Status: DISCONTINUED | OUTPATIENT
Start: 2024-10-11 | End: 2024-10-12 | Stop reason: HOSPADM

## 2024-10-11 RX ORDER — POTASSIUM CHLORIDE 1500 MG/1
40 TABLET, EXTENDED RELEASE ORAL PRN
Status: DISCONTINUED | OUTPATIENT
Start: 2024-10-11 | End: 2024-10-12 | Stop reason: HOSPADM

## 2024-10-11 RX ORDER — ONDANSETRON 2 MG/ML
4 INJECTION INTRAMUSCULAR; INTRAVENOUS EVERY 6 HOURS PRN
Status: DISCONTINUED | OUTPATIENT
Start: 2024-10-11 | End: 2024-10-12 | Stop reason: HOSPADM

## 2024-10-11 RX ORDER — POLYETHYLENE GLYCOL 3350 17 G/17G
17 POWDER, FOR SOLUTION ORAL DAILY PRN
Status: DISCONTINUED | OUTPATIENT
Start: 2024-10-11 | End: 2024-10-12 | Stop reason: HOSPADM

## 2024-10-11 RX ORDER — ACETAMINOPHEN 325 MG/1
650 TABLET ORAL EVERY 6 HOURS PRN
Status: DISCONTINUED | OUTPATIENT
Start: 2024-10-11 | End: 2024-10-12 | Stop reason: HOSPADM

## 2024-10-11 RX ORDER — ACETAMINOPHEN 650 MG/1
650 SUPPOSITORY RECTAL EVERY 6 HOURS PRN
Status: DISCONTINUED | OUTPATIENT
Start: 2024-10-11 | End: 2024-10-12 | Stop reason: HOSPADM

## 2024-10-11 RX ORDER — MAGNESIUM SULFATE IN WATER 40 MG/ML
2000 INJECTION, SOLUTION INTRAVENOUS PRN
Status: DISCONTINUED | OUTPATIENT
Start: 2024-10-11 | End: 2024-10-12 | Stop reason: HOSPADM

## 2024-10-11 RX ORDER — POTASSIUM CHLORIDE 7.45 MG/ML
10 INJECTION INTRAVENOUS PRN
Status: DISCONTINUED | OUTPATIENT
Start: 2024-10-11 | End: 2024-10-12 | Stop reason: HOSPADM

## 2024-10-11 RX ORDER — SODIUM CHLORIDE 0.9 % (FLUSH) 0.9 %
5-40 SYRINGE (ML) INJECTION PRN
Status: DISCONTINUED | OUTPATIENT
Start: 2024-10-11 | End: 2024-10-12 | Stop reason: HOSPADM

## 2024-10-11 RX ORDER — WHEAT DEXTRIN 3 G/3.8 G
4 POWDER (GRAM) ORAL
Status: DISCONTINUED | OUTPATIENT
Start: 2024-10-12 | End: 2024-10-11

## 2024-10-11 RX ORDER — ENOXAPARIN SODIUM 100 MG/ML
30 INJECTION SUBCUTANEOUS 2 TIMES DAILY
Status: DISCONTINUED | OUTPATIENT
Start: 2024-10-11 | End: 2024-10-12 | Stop reason: HOSPADM

## 2024-10-11 RX ORDER — HYDROCHLOROTHIAZIDE 25 MG/1
25 TABLET ORAL DAILY
Status: DISCONTINUED | OUTPATIENT
Start: 2024-10-12 | End: 2024-10-12 | Stop reason: HOSPADM

## 2024-10-11 RX ORDER — ONDANSETRON 4 MG/1
4 TABLET, ORALLY DISINTEGRATING ORAL EVERY 8 HOURS PRN
Status: DISCONTINUED | OUTPATIENT
Start: 2024-10-11 | End: 2024-10-12 | Stop reason: HOSPADM

## 2024-10-11 RX ORDER — ACETAMINOPHEN 325 MG/1
650 TABLET ORAL EVERY 6 HOURS PRN
Status: DISCONTINUED | OUTPATIENT
Start: 2024-10-11 | End: 2024-10-11 | Stop reason: SDUPTHER

## 2024-10-11 RX ORDER — SODIUM CHLORIDE 0.9 % (FLUSH) 0.9 %
5-40 SYRINGE (ML) INJECTION EVERY 12 HOURS SCHEDULED
Status: DISCONTINUED | OUTPATIENT
Start: 2024-10-11 | End: 2024-10-12 | Stop reason: HOSPADM

## 2024-10-11 RX ORDER — RISPERIDONE 0.25 MG/1
0.5 TABLET ORAL NIGHTLY
Status: DISCONTINUED | OUTPATIENT
Start: 2024-10-11 | End: 2024-10-12 | Stop reason: HOSPADM

## 2024-10-11 RX ORDER — VALSARTAN AND HYDROCHLOROTHIAZIDE 160; 25 MG/1; MG/1
1 TABLET ORAL DAILY
Status: DISCONTINUED | OUTPATIENT
Start: 2024-10-12 | End: 2024-10-11 | Stop reason: SDUPTHER

## 2024-10-11 RX ORDER — RISPERIDONE 0.25 MG/1
0.5 TABLET ORAL DAILY
Status: DISCONTINUED | OUTPATIENT
Start: 2024-10-12 | End: 2024-10-11

## 2024-10-11 RX ADMIN — RISPERIDONE 0.5 MG: 0.25 TABLET, FILM COATED ORAL at 23:36

## 2024-10-11 RX ADMIN — BUSPIRONE HYDROCHLORIDE 10 MG: 5 TABLET ORAL at 23:36

## 2024-10-11 RX ADMIN — IOPAMIDOL 75 ML: 755 INJECTION, SOLUTION INTRAVENOUS at 18:56

## 2024-10-11 RX ADMIN — Medication 10 ML: at 23:36

## 2024-10-11 RX ADMIN — Medication 1 TABLET: at 23:36

## 2024-10-11 ASSESSMENT — ENCOUNTER SYMPTOMS
ABDOMINAL PAIN: 0
COUGH: 0
SHORTNESS OF BREATH: 1
VOMITING: 0
NAUSEA: 0
EYE PAIN: 0
DIARRHEA: 0

## 2024-10-11 ASSESSMENT — PAIN SCALES - GENERAL: PAINLEVEL_OUTOF10: 0

## 2024-10-11 ASSESSMENT — PAIN - FUNCTIONAL ASSESSMENT: PAIN_FUNCTIONAL_ASSESSMENT: 0-10

## 2024-10-11 NOTE — ED PROVIDER NOTES
Emergency Department Attending Provider Note  Location: [facility]  ED  10/11/2024     Patient Identification  Ashley Melara is a 79 y.o. female      Ashley Melara was evaluated in the Emergency Department for vision changes and balance issues. Although initial history and physical exam information was obtained by Dr. Fierro (who also dictated a record of this visit), I personally saw the patient and performed a substantive portion of the visit including all aspects of the medical decision making.    Patient seen and evaluated.  Relevant records reviewed.  Patient is a 79-year-old female with history of hypertension, diabetes, obesity who presents with difficulty with ambulating and vision changes.  Patient reports that symptoms started sometime this morning.  She states that she has been feeling off balance and her vision is blurred.  She denies any facial droop, unilateral weakness or numbness.  Denies any increasing confusion.  She has no history of stroke in the past.    Patient presented hypertensive at 155/73 but vitals otherwise normal.  On exam she has no focal deficits.  Her NIHSS was 0.  She has a regular rate and rhythm clear breath sounds bilaterally.  Given duration of symptoms code stroke was not activated.    Chronic medical conditions include diabetes, hypertension, hyperlipidemia  No significant social determinants    Diagnostic studies reviewed and interpreted  EKG with accelerated junctional rhythm with a rate of 66, normal QRS, normal QTc, nonspecific T wave changes no STEMI  CBC with a normal white blood cell count, no evidence of anemia, normal platelets  CMP with normal sodium and potassium, low chloride of 97, elevated BUN to 21 with a normal creatinine, worsening GFR 51, hyperglycemic to 126, normal LFTs and bilirubin  Normal troponin at 9  Normal proBNP at 206  CT head without contrast with no acute intracranial normality  CTA head and neck with IV contrast with no acute

## 2024-10-11 NOTE — PROGRESS NOTES
Ashley Melara (: 1945) is a 79 y.o. female, Established patient, here for evaluation of the following chief complaint(s):  Dizziness (Blurred vision. 154 sugar at 10:30am. ) and Care Coordination (It is suggested that the pt go to the ER for further evaluation of dizziness and blurred vision)      ASSESSMENT/PLAN:    ICD-10-CM    1. Dizziness  R42       2. Blurred vision  H53.8         - It is suggested that the pt go to the ER for further evaluation of dizziness and blurred vision  - Pt understands and agrees to go to the ER for further evaluation of dizziness with blurred vision. Pt states she does not want to go by EMS and would like to go by private vehicle with  driving her. Report givent radha P[place]     Urgent Care Triage to ED:       Ashley Melara (:  1945) is a 79 y.o. female,Established patient, here for evaluation of the following :  Chief Complaint   Patient presents with    Dizziness     Blurred vision. 154 sugar at 10:30am.     Care Coordination     It is suggested that the pt go to the ER for further evaluation of dizziness and blurred vision   :     Objective      Vitals:    10/11/24 1614   BP: 132/75   Pulse: 70   Temp: 97 °F (36.1 °C)   TempSrc: Oral   SpO2: 98%   Weight: 103.4 kg (228 lb)   Height: 1.549 m (5' 1\")         Assessment & Plan      The patient was evaluated for: dizziness and blurred vision with possible neurological abnormality as differential diagnosis.       Due to the complexity of patient's concerns, medical risks, and potential high risk for  complications, I recommend patient to be evaluated in the emergency room today.   The risks have been explained to the patient, including stroke or death.    Patient is alert with normal mental status and adequate capacity to make medical decisions.   Opportunities to ask questions about medical condition were provided.      Attempt to enlist the patient's family support to safely transfer the

## 2024-10-11 NOTE — ED NOTES
Patient complaining of feeling \"a little shaky\", requested a blood glucose check. Blood glucose 83. Spoke with Dr. Kim pt able to have PO intake. Pt provided with pudding and apple juice.

## 2024-10-11 NOTE — ED PROVIDER NOTES
Emergency Department Attending Physician Note  Location: [facility]  ED  10/11/2024       Pt Name: Ashley Melara  MRN: [id number]  Birthdate 1945    Date of evaluation: 10/11/2024  Provider: Gisel Fierro DO  PCP: Han Fuentes MD    Note Started: 5:18 PM EDT 10/11/24    CHIEF COMPLAINT  Chief Complaint   Patient presents with    Blurred Vision     Noticed blurred vision at 1100. \"Still blurry but not at bad.\"  checked at home. Also c/o being unbalanced. \"My feet and my shoulders won't work.\" Pt ab le to ambulate from lobby to room w/o assistance.        HISTORY OF PRESENT ILLNESS:  History obtained by patient. Limitations to history : None.     Ashley Melara is a 79 y.o. female with a significant PMHx of type 2 diabetes, hyperlipidemia, hypertension, COPD,    Patient presented to the emergency department after visit from the urgent care for unsteadiness.  Patient does not feel dizzy or lightheaded but feels that her feet are unsteady.  Patient states that this started at 10 AM this morning and denies any sick symptoms.   did not note any changes in speech or facial droop.  Patient does have's some associated symptoms of blurry vision and mild shortness of breath worsening from her usual shortness of breath.  Patient has COPD but is not on any baseline oxygen.    Patient denies any chest pain, nausea, vomiting, or diarrhea.  Patient also denies any urinary symptoms.    Nursing Notes were all reviewed and agreed with or any disagreements were addressed in the HPI.      MEDICAL HISTORY  Past Medical History:   Diagnosis Date    Closed displaced fracture of greater tuberosity of right humerus     COPD (chronic obstructive pulmonary disease) (HCC)     Hyperlipidemia     Hyperlipidemia associated with type 2 diabetes mellitus (HCC) 10/05/2015    Hypertension     Morbid obesity with BMI of 40.0-44.9, adult 10/05/2015    Osteoarthrosis, not specified whether  Scale Score: 15       WA Assessment  BP: 131/77  Pulse: 68             REVIEW OF SYSTEMS:    Review of Systems  Positives and Pertinent negatives as per HPI.    _____________________________________      PHYSICAL EXAM:     Vitals:    10/12/24 0429 10/12/24 0715 10/12/24 0914 10/12/24 1245   BP: (!) 164/70 (!) 162/73 129/81 131/77   Pulse: 67 72 62 68   Resp: 20 22  20   Temp: 98 °F (36.7 °C) 97.6 °F (36.4 °C)  97.8 °F (36.6 °C)   TempSrc: Oral Oral  Oral   SpO2: 97% 96% 98% 97%   Weight:       Height:           Physical Exam  Constitutional:       General: She is not in acute distress.     Appearance: She is not toxic-appearing.   Eyes:      Extraocular Movements: Extraocular movements intact.      Pupils: Pupils are equal, round, and reactive to light.   Cardiovascular:      Rate and Rhythm: Normal rate.      Heart sounds: No murmur heard.     No friction rub. No gallop.   Pulmonary:      Effort: Pulmonary effort is normal. No respiratory distress.      Breath sounds: No wheezing or rales.   Abdominal:      General: Abdomen is flat. There is no distension.      Tenderness: There is no abdominal tenderness. There is no rebound.   Musculoskeletal:      Right lower leg: Edema present.      Left lower leg: Edema present.      Comments: +2 pitting edema   Neurological:      General: No focal deficit present.      Mental Status: She is alert and oriented to person, place, and time.      Cranial Nerves: No cranial nerve deficit.      Sensory: No sensory deficit.      Motor: No weakness.      Coordination: Coordination normal.           DIAGNOSTIC RESULTS:  LABS:    Labs Reviewed   CBC WITH AUTO DIFFERENTIAL - Abnormal; Notable for the following components:       Result Value    RDW 16.3 (*)     All other components within normal limits   COMPREHENSIVE METABOLIC PANEL W/ REFLEX TO MG FOR LOW K - Abnormal; Notable for the following components:    Chloride 97 (*)     Glucose 126 (*)     BUN 21 (*)     Est, Glom Filt Rate

## 2024-10-12 ENCOUNTER — APPOINTMENT (OUTPATIENT)
Dept: MRI IMAGING | Age: 79
End: 2024-10-12
Payer: MEDICARE

## 2024-10-12 VITALS
TEMPERATURE: 97.8 F | HEIGHT: 61 IN | OXYGEN SATURATION: 97 % | WEIGHT: 227 LBS | HEART RATE: 68 BPM | DIASTOLIC BLOOD PRESSURE: 77 MMHG | SYSTOLIC BLOOD PRESSURE: 131 MMHG | RESPIRATION RATE: 20 BRPM | BODY MASS INDEX: 42.86 KG/M2

## 2024-10-12 LAB
EKG DIAGNOSIS: NORMAL
EKG Q-T INTERVAL: 398 MS
EKG QRS DURATION: 62 MS
EKG QTC CALCULATION (BAZETT): 417 MS
EKG R AXIS: 31 DEGREES
EKG T AXIS: 11 DEGREES
EKG VENTRICULAR RATE: 66 BPM
GLUCOSE BLD-MCNC: 106 MG/DL (ref 70–99)
GLUCOSE BLD-MCNC: 143 MG/DL (ref 70–99)
PERFORMED ON: ABNORMAL
PERFORMED ON: ABNORMAL

## 2024-10-12 PROCEDURE — G0378 HOSPITAL OBSERVATION PER HR: HCPCS

## 2024-10-12 PROCEDURE — 6360000002 HC RX W HCPCS: Performed by: FAMILY MEDICINE

## 2024-10-12 PROCEDURE — 93010 ELECTROCARDIOGRAM REPORT: CPT | Performed by: INTERNAL MEDICINE

## 2024-10-12 PROCEDURE — 6370000000 HC RX 637 (ALT 250 FOR IP): Performed by: FAMILY MEDICINE

## 2024-10-12 PROCEDURE — 97162 PT EVAL MOD COMPLEX 30 MIN: CPT

## 2024-10-12 PROCEDURE — 97110 THERAPEUTIC EXERCISES: CPT

## 2024-10-12 PROCEDURE — 97116 GAIT TRAINING THERAPY: CPT

## 2024-10-12 PROCEDURE — 97165 OT EVAL LOW COMPLEX 30 MIN: CPT

## 2024-10-12 PROCEDURE — 97535 SELF CARE MNGMENT TRAINING: CPT

## 2024-10-12 PROCEDURE — 70551 MRI BRAIN STEM W/O DYE: CPT

## 2024-10-12 PROCEDURE — 2580000003 HC RX 258: Performed by: FAMILY MEDICINE

## 2024-10-12 PROCEDURE — 96372 THER/PROPH/DIAG INJ SC/IM: CPT

## 2024-10-12 RX ORDER — ASPIRIN 81 MG/1
81 TABLET ORAL DAILY
Qty: 30 TABLET | Refills: 0 | Status: SHIPPED | OUTPATIENT
Start: 2024-10-12

## 2024-10-12 RX ADMIN — AMLODIPINE BESYLATE 5 MG: 5 TABLET ORAL at 07:48

## 2024-10-12 RX ADMIN — Medication 10 ML: at 07:48

## 2024-10-12 RX ADMIN — HYDROCHLOROTHIAZIDE 25 MG: 25 TABLET ORAL at 07:48

## 2024-10-12 RX ADMIN — Medication 1 TABLET: at 07:48

## 2024-10-12 RX ADMIN — VALSARTAN 160 MG: 80 TABLET, FILM COATED ORAL at 07:48

## 2024-10-12 RX ADMIN — BUSPIRONE HYDROCHLORIDE 10 MG: 5 TABLET ORAL at 07:48

## 2024-10-12 RX ADMIN — ENOXAPARIN SODIUM 30 MG: 100 INJECTION SUBCUTANEOUS at 07:48

## 2024-10-12 RX ADMIN — PRAVASTATIN SODIUM 40 MG: 40 TABLET ORAL at 07:48

## 2024-10-12 NOTE — PLAN OF CARE
Problem: Neurosensory - Adult  Goal: Achieves stable or improved neurological status  Outcome: Progressing   TODAY'S DATE:  10/12/2024      Current NIHSS 0      Discussed personal risk factors for Stroke/TIA with patient/family, and ways to reduce the risk for a recurrent stroke.     Patient's personal risk factors which were identified are:   []   Alcohol Abuse: check with your physician before any alcohol consumption.   []   Atrial fibrillation: may cause blood clots  []   Drug Abuse: Seek help, talk with your doctor  []   Clotting Disorder  [x]   Diabetes  []   Family history of stroke or heart disease  [x]   High Blood Pressure/Hypertension: work with your physician  [x]   High cholesterol: monitor cholesterol levels with your physician  [x]   Overweight/Obesity: work with your physician for your ideal body weight  [x]   Physical Inactivity: get regular exercise as directed by your physician  []   Personal history of previous TIA or stroke  [x]   Poor Diet: decrease salt (sodium) in your diet, follow diet directed by physician  []   Smoking: stop smoking      Reviewed the Following Education with Patient and/or Family:   - Signs and Symptoms of Stroke  - Personal risk factors   - How to activate EMS (911)   - Importance of Follow Up Appointments at Discharge   - Importance of Compliance with Medications Prescribed at Discharge  - Available community resources and stroke advocacy groups if needed    Patient and/or family member: verbalized understanding.     Stroke Education booklet given to patient/family (or verified, if given already), which reviews above information. yes         Electronically signed by Jewels Wheatley RN on 10/12/2024 at 2:28 AM

## 2024-10-12 NOTE — H&P
has a past surgical history that includes joint replacement; blepharoplasty (5/10/2012); and Colonoscopy (8/1/2007).  Allergies:   Allergies   Allergen Reactions    Lutein Other (See Comments)    Tessalon [Benzonatate]     Keflex [Cephalexin] Rash     Diffuse rash on chest and arms following initiation of Keflex    Diazepam     Diclofenac Sodium     Lycopene      Multiv-Min-FA-Lycopene-Lutein    Metformin     Olanzapine     Tape [Adhesive Tape]     Zocor [Simvastatin - High Dose]      Fam HX:  family history includes Cancer in her sister; Dementia in her brother; Stroke in her sister.  Soc HX:   Social History     Socioeconomic History    Marital status:      Spouse name: None    Number of children: None    Years of education: None    Highest education level: None   Tobacco Use    Smoking status: Never    Smokeless tobacco: Never   Vaping Use    Vaping status: Never Used   Substance and Sexual Activity    Alcohol use: No    Drug use: No    Sexual activity: Not Currently     Partners: Male     Social Determinants of Health      [facility]    Food Insecurities    [facility], [facility]    Transportation   Physical Activity: Inactive (12/4/2023)    Exercise Vital Sign     Days of Exercise per Week: 0 days     Minutes of Exercise per Session: 0 min    [facility], [facility]    Interpersonal Safety    [facility], [facility]    Housing/Utilities       Medications:   Medications:    sodium chloride flush  5-40 mL IntraVENous 2 times per day    enoxaparin  30 mg SubCUTAneous BID    busPIRone  10 mg Oral BID    calcium carbonate-cholecalciferol  1 tablet Oral BID    [START ON 10/12/2024] risperiDONE  0.5 mg Oral  Daily    [START ON 10/12/2024] pravastatin  40 mg Oral Daily    [START ON 10/12/2024] valsartan-hydroCHLOROthiazide  1 tablet Oral Daily    [START ON 10/12/2024] amLODIPine  5 mg Oral Daily    [START ON 10/12/2024] Benefiber  4 g Oral TID WC    insulin lispro  0-8 Units SubCUTAneous 4x Daily AC & HS      Infusions:    sodium chloride       PRN Meds: sodium chloride flush, 5-40 mL, PRN  sodium chloride, , PRN  potassium chloride, 40 mEq, PRN   Or  potassium alternative oral replacement, 40 mEq, PRN   Or  potassium chloride, 10 mEq, PRN  magnesium sulfate, 2,000 mg, PRN  ondansetron, 4 mg, Q8H PRN   Or  ondansetron, 4 mg, Q6H PRN  polyethylene glycol, 17 g, Daily PRN  acetaminophen, 650 mg, Q6H PRN   Or  acetaminophen, 650 mg, Q6H PRN        Labs      CBC:   Recent Labs     10/11/24  1753   WBC 7.4   HGB 13.0        BMP:    Recent Labs     10/11/24  1753      K 3.7   CL 97*   CO2 30   BUN 21*   CREATININE 1.1   GLUCOSE 126*     Hepatic:   Recent Labs     10/11/24  1753   AST 27   ALT 18   BILITOT 0.3   ALKPHOS 122     Lipids:   Lab Results   Component Value Date/Time    CHOL 149 07/18/2024 09:40 AM    HDL 68 07/18/2024 09:40 AM    TRIG 99 07/18/2024 09:40 AM     Hemoglobin A1C:   Lab Results   Component Value Date/Time    LABA1C 6.2 07/18/2024 09:40 AM     TSH:   Lab Results   Component Value Date/Time    TSH 2.76 10/05/2018 08:41 AM     Troponin: No results found for: \"TROPONINT\"  Lactic Acid: No results for input(s): \"LACTA\" in the last 72 hours.  BNP:   Recent Labs     10/11/24  1753   PROBNP 206     UA:  Lab Results   Component Value Date/Time    LEUKOCYTESUR neg 07/19/2024 10:17 AM    BILIRUBINUR neg 07/19/2024 10:17 AM    BLOODU neg 07/19/2024 10:17 AM    GLUCOSEU neg 07/19/2024 10:17 AM    KETUA neg 07/19/2024 10:17 AM     Urine Cultures:   Lab Results   Component Value Date/Time    LABURIN  07/19/2024 10:14 AM     <10,000 CFU/ml mixed skin/urogenital gregory. No further workup    LABURIN >100,000

## 2024-10-12 NOTE — ED NOTES
Ashley Melara is a 79 y.o. female admitted for  Principal Problem:    Unsteady gait  Active Problems:    Hyperlipidemia    Benign essential HTN    Paranoid type schizophrenia (HCC)    Diabetes mellitus, type II (HCC)  Resolved Problems:    * No resolved hospital problems. *  .   Patient Home via family with   Chief Complaint   Patient presents with    Blurred Vision     Noticed blurred vision at 1100. \"Still blurry but not at bad.\"  checked at home. Also c/o being unbalanced. \"My feet and my shoulders won't work.\" Pt ab le to ambulate from lobby to room w/o assistance.    .  Patient is alert and Person, Place, Time, and Situation  Patient's baseline mobility: Baseline Mobility: Minimal assist  Code Status: Full Code   Cardiac Rhythm:       Is patient on baseline Oxygen: no how many Liters:   Abnormal Assessment Findings:     Isolation: None      NIH Score:    C-SSRS: Risk of Suicide: No Risk  Bedside swallow:        Active LDA's:   Peripheral IV 10/11/24 Left Antecubital (Active)   Site Assessment Clean, dry & intact 10/11/24 1727   Line Status Brisk blood return;Specimen collected;Flushed;Normal saline locked 10/11/24 1727   Line Care Cap changed 10/11/24 1727   Phlebitis Assessment No symptoms 10/11/24 1727   Infiltration Assessment 0 10/11/24 1727   Alcohol Cap Used No 10/11/24 1727   Dressing Status New dressing applied;Clean, dry & intact 10/11/24 1727   Dressing Type Transparent 10/11/24 1727   Dressing Intervention New 10/11/24 1727     Patient admitted with a diaz: no If the diaz is chronic was it exchanged:  Reason for diaz:   Patient admitted with Central Line:  . PICC line placement confirmed:   Reason for Central line:   Was central line Inserted from an outside facility:        Family/Caregiver Present No bu daughter Kenna updated on patients admission Any Concerns: no   Restraints no  Sitter no         Vitals: MEWS Score: 1    Vitals:    10/11/24 1953 10/11/24 2018 10/11/24 2039

## 2024-10-12 NOTE — PROGRESS NOTES
Occupational Therapy  Facility/Department: [place] [address] - MED SURG  Occupational Therapy Initial Assessment    Name: Ashley Melara  : 1945  MRN: [id number]  Date of Service: 10/12/2024    Discharge Recommendations:  Home with assist PRN  OT Equipment Recommendations  Equipment Needed: No       Patient Diagnosis(es): Unsteady Gait  Past Medical History:  has a past medical history of Closed displaced fracture of greater tuberosity of right humerus, COPD (chronic obstructive pulmonary disease) (HCC), Hyperlipidemia, Hyperlipidemia associated with type 2 diabetes mellitus (HCC), Hypertension, Morbid obesity with BMI of 40.0-44.9, adult, Osteoarthrosis, not specified whether generalized/localized, lower leg, Paranoid type schizophrenia (HCC), Syncope and collapse, Type 2 diabetes mellitus without complication (HCC), and Type II or unspecified type diabetes mellitus without mention of complication, not stated as uncontrolled.  Past Surgical History:  has a past surgical history that includes joint replacement; blepharoplasty (5/10/2012); and Colonoscopy (2007).           Assessment  Assessment: Pt is a 80yo woman admitted d/t unsteady gait with reports of difficulty transferring and blurred vision. Pt from home with spouse, level entry. Pt does not use AD at baseline and is indep with ADLs. Pt did not demo functional deficits this day, SBA for reports of dizziness only however pt mobilixined within the room/bathroom well. Pt is safe to d/c home when medically ready.  Prognosis: Good  Decision Making: Low Complexity  No Skilled OT: At baseline function;Safe to return home  REQUIRES OT FOLLOW-UP: No  Activity Tolerance  Activity Tolerance: Patient Tolerated treatment well     Plan  Occupational Therapy Plan  Times Per Week: 1x only  Current Treatment Recommendations: Strengthening, Functional mobility training, Endurance training, Safety education & training, Self-Care / ADL, Cognitive/Perceptual training,

## 2024-10-12 NOTE — PROGRESS NOTES
4 Eyes Skin Assessment and Patient belongings     The patient is being assess for  Admission    I agree that 2 Nurses have performed a thorough Head to Toe Skin Assessment on the patient. ALL assessment sites listed below have been assessed.       Areas assessed by both nurses:   [x]   Head, Face, and Ears   [x]   Shoulders, Back, and Chest  [x]   Arms, Elbows, and Hands   [x]   Coccyx, Sacrum, and IschIum  [x]   Legs, Feet, and Heels        Does the Patient have Skin Breakdown?  No; redness under the right breast and abdominal folds        [place] Prevention initiated:  No   Wound Care Orders initiated:  No      [facility] nurse consulted for Pressure Injury (Stage 3,4, Unstageable, DTI, NWPT, and Complex wounds), New and Established Ostomies:  No      I agree that 2 Nurses have reviewed patient belongings with the patient/family and documented in the flowsheet upon admission or transfer to the unit.     Belongings  Dental Appliances: None  Vision - Corrective Lenses: Eyeglasses, At home  Hearing Aid: None  Clothing: Shirt, Pants, Undergarments, At bedside  Jewelry: Ring, Watch, At bedside  Electronic Devices: Cell Phone, At bedside  Weapons (Notify Protective Services/Security): None  Home Medications: None  Valuables Given To: Patient  Provide Name(s) of Who Valuable(s) Were Given To: Ashley eMlara       Nurse 1 eSignature: Electronically signed by AMY DANG RN on 10/11/24 at 10:55 PM EDT    **SHARE this note so that the co-signing nurse is able to place an eSignature**    Nurse 2 eSignature: Electronically signed by Jewels Wheatley RN on 10/11/24 at 11:25 PM EDT

## 2024-10-12 NOTE — PROGRESS NOTES
Physical Therapy  Facility/Department: [place] [address] - MED SURG  Physical Therapy Initial Assessment/Treatment    Name: Ashley Melara  : 1945  MRN: [id number]  Date of Service: 10/12/2024    Discharge Recommendations:  Home with assist PRN, Home with Home health PT, Outpatient PT   PT Equipment Recommendations  Equipment Needed: No    If pt discharges prior to next PT session this note will serve as discharge summary.   Patient Diagnosis(es): Unsteady gait  Past Medical History:  has a past medical history of Closed displaced fracture of greater tuberosity of right humerus, COPD (chronic obstructive pulmonary disease) (HCC), Hyperlipidemia, Hyperlipidemia associated with type 2 diabetes mellitus (HCC), Hypertension, Morbid obesity with BMI of 40.0-44.9, adult, Osteoarthrosis, not specified whether generalized/localized, lower leg, Paranoid type schizophrenia (HCC), Syncope and collapse, Type 2 diabetes mellitus without complication (HCC), and Type II or unspecified type diabetes mellitus without mention of complication, not stated as uncontrolled.  Past Surgical History:  has a past surgical history that includes joint replacement; blepharoplasty (5/10/2012); and Colonoscopy (2007).    Assessment  Body Structures, Functions, Activity Limitations Requiring Skilled Therapeutic Intervention: Decreased functional mobility ;Decreased strength;Decreased safe awareness;Decreased balance  Assessment: 78 yo female on OBS due to unsteady gait, R/O CVA. Pt hx DM, HTN, paranoid schizophrenia. Pt lives with spouse and is typically indep amb without device. Today pt supervised for bed mob and transfers with cues for safety. Pt ambulated without device but at slow pace with increased trunk sway, shortened stride and decreased heel strike/toe off. Pt will benefit from skilled PT to address gait deviations ,balance, strength and safety deficits. Recommend home with assist prn and home or OP PT.  Treatment Diagnosis:  decreased gait and balance  Therapy Prognosis: Good  Decision Making: Medium Complexity  Barriers to Learning: none  Requires PT Follow-Up: Yes  Activity Tolerance  Activity Tolerance: Patient tolerated evaluation without incident;Patient tolerated treatment well    Plan  Physical Therapy Plan  General Plan: 3-5 times per week  Current Treatment Recommendations: Strengthening, Balance training, Functional mobility training, Transfer training, Gait training, Home exercise program, Therapeutic activities, Safety education & training  Safety Devices  Type of Devices: Call light within reach, Chair alarm in place, Left in chair, Gait belt, Patient at risk for falls, Nurse notified    Restrictions  Restrictions/Precautions  Restrictions/Precautions: General Precautions, Up as Tolerated, Fall Risk     Subjective  General  Chart Reviewed: Yes  Patient assessed for rehabilitation services?: Yes  Family / Caregiver Present: No  Referring Practitioner: [place]  Referral Date : 10/11/24  Diagnosis: Unsteady gait, R/O CVA  Follows Commands: Within Functional Limits  General Comment  Comments: RN cleared pt for therapy, pt resting in bed on approach  Subjective  Subjective: Pt agreed to therapy  Pain: denies         Social/Functional History  Social/Functional History  Lives With: Spouse  Type of Home: House  Home Layout: One level  Home Access: Level entry  Bathroom Toilet: Handicap height  Bathroom Equipment: Grab bars in shower, Hand-held shower, Built-in shower seat  Bathroom Accessibility: Accessible  Home Equipment: Cane, Walker - Standard, Adjustable bed  Has the patient had two or more falls in the past year or any fall with injury in the past year?: No  ADL Assistance: Independent  Homemaking Assistance: Independent  Homemaking Responsibilities: Yes  Ambulation Assistance: Independent  Transfer Assistance: Independent  Active : No  Occupation: Retired  Type of Occupation: book

## 2024-10-12 NOTE — DISCHARGE SUMMARY
V2.0  Discharge Summary    Name:  Ashley Melara /Age/Sex: 1945 (79 y.o. female)   Admit Date: 10/11/2024  Discharge Date: 10/12/24    MRN & CSN:  [id number] & [id number] Encounter Date and Time 10/12/24 1:14 PM EDT    Attending:  Marguerite Luque MD Discharging Provider: Marguerite Luque MD       Hospital Course:     Brief HPI: Ashley Melara is a 79 y.o. female with pmh of hypertension, diabetes mellitus who presents with above complaints.  Onset was early this morning about 10 AM.  She denies any lightheadedness or vertigo.  No recent URI symptoms.  She has a history of vaguely described visual problems however she reports associated blurred vision in her right field of vision.  She states that this is currently resolved.  She decided to present for evaluation due to concerns about falls.  She has a history of hypertension and diabetes mellitus.  She lives at home with her  and normally ambulates without any mechanical aids.     Brief Problem Based Course:   Unsteady gait  -Acute onset.  Associated visual complaints is concerning for possible CVA.  NIHSS - 0  -CT head negative for any obvious bleed.  CT a head and neck without LVO  -MRI brain negative for any acute CVA, moderate cerebral volume loss, small area of encephalomalacia involving posterolateral r temporal lobe  -Continue antiplatelet with aspirin.  Continue statin therapy.  -Consult PT OT who recommended [facility]    No other medications changes have been done to patient's prior medications while inpatient      The patient expressed appropriate understanding of, and agreement with the discharge recommendations, medications, and plan.     Consults this admission:  IP CONSULT TO HOME CARE NEEDS    Discharge Diagnosis:   Unsteady gait      Discharge Instruction:   Follow up appointments:   Primary care physician: Han Fuentes MD within 2 weeks  Diet: regular diet   Activity: activity as tolerated  Disposition: Discharged to:   []Home,  [x]C, []SNF, []Acute Rehab, []Hospice   Condition on discharge: Stable  Labs and Tests to be Followed up as an outpatient by PCP or Specialist:     Discharge Medications:        Medication List        CONTINUE taking these medications      Accu-Chek Ginette Plus strip  Generic drug: blood glucose test strips  USE TO TEST TWICE A DAY. DX:E11.9     Accu-Chek Ginette Plus w/Device Kit  Check twice daily. DX: E11.9     amLODIPine 5 MG tablet  Commonly known as: NORVASC  TAKE 1 TABLET BY MOUTH DAILY     BENEFIBER PO     busPIRone 10 MG tablet  Commonly known as: BUSPAR     Calcium Plus Vitamin D3 600-800 MG-UNIT Tabs  Generic drug: calcium carbonate-cholecalciferol     glimepiride 2 MG tablet  Commonly known as: AMARYL  TAKE 1 TABLET BY MOUTH IN THE  MORNING     * Lancets Misc  One every day     * Accu-Chek Softclix Lancets Misc  USE TO TEST TWICE A DAY. DX:E11.9     pioglitazone 30 MG tablet  Commonly known as: ACTOS  TAKE 1 TABLET BY MOUTH ONCE  DAILY     pravastatin 40 MG tablet  Commonly known as: PRAVACHOL  TAKE 1 TABLET BY MOUTH DAILY     RisperDAL 0.5 MG tablet  Generic drug: risperiDONE     valsartan-hydroCHLOROthiazide 160-25 MG per tablet  Commonly known as: DIOVAN-HCT  TAKE 1 TABLET BY MOUTH DAILY           * This list has 2 medication(s) that are the same as other medications prescribed for you. Read the directions carefully, and ask your doctor or other care provider to review them with you.                ASK your doctor about these medications      acetaminophen 325 MG tablet  Commonly known as: TYLENOL             Objective Findings at Discharge:   /77   Pulse 68   Temp 97.8 °F (36.6 °C) (Oral)   Resp 20   Ht 1.549 m (5' 1\")   Wt 103 kg (227 lb)   SpO2 97%   BMI 42.89 kg/m²       Physical Exam:   Physical Exam   General: NAD  Eyes: EOMI.  ENT: neck supple  Cardiovascular: Regular rate and rhythm  Respiratory: Clear to auscultation  Gastrointestinal: Soft, obese . non tender.  No

## 2024-10-12 NOTE — CARE COORDINATION
CASE MANAGEMENT DISCHARGE SUMMARY      Discharge to: home with [facility].  Denies other needs at d/c.       IMM given: Moon        Transportation:    Family/car: family       Confirmed discharge plan with:     Patient: yes     Family:  yes     Facility/Agency, name:  [facility] to obtain from Epic        RN, name: Marlin    Note: Discharging nurse to complete [facility], reconcile [facility], and place final copy with patient's discharge packet. RN to ensure that written prescriptions for  Level II medications are sent with patient to the facility as per protocol.

## 2024-10-12 NOTE — DISCHARGE INSTR - COC
Continuity of Care Form    Patient Name: Ashley Melara   :  1945  MRN:  [id number]    Admit date:  10/11/2024  Discharge date:  10/12    Code Status Order: Full Code   Advance Directives:   Advance Care Flowsheet Documentation             Admitting Physician:  Brown Pinzon MD  PCP: Han Fuentes MD    Discharging Nurse: Marlin  Discharging Hospital Unit/Room#: 0368/0368-01  Discharging Unit Phone Number:     Emergency Contact:   Extended Emergency Contact Information  Primary Emergency Contact: Michelle Macias  Mobile Phone: [phone]  Relation: Child  Secondary Emergency Contact: SarithaProsper  Address: [address] [place] [address] [place] [address] [facility]  Home Phone: [phone]  Mobile Phone: [phone]  Relation: Spouse    Past Surgical History:  Past Surgical History:   Procedure Laterality Date    BLEPHAROPLASTY  5/10/2012    COLONOSCOPY  2007    JOINT REPLACEMENT         Immunization History:   Immunization History   Administered Date(s) Administered    COVID-19, MODERNA BLUE border, Primary or Immunocompromised, (age 12y+), IM, 100 mcg/0.5mL 2021, 2021, 2021    COVID-19, MODERNA Bivalent, (age 12y+), IM, 50 mcg/0.5 mL 10/14/2022    COVID-19, PFIZER, (age 12y+), IM, 30mcg/0.3mL 2023    Influenza 2011, 09/10/2012, 2013    Influenza Virus Vaccine 2014, 10/05/2015, 2019, 10/05/2020    Influenza Whole 2010    Influenza, FLUZONE High Dose (age 65 y+), IM, Quadv, 0.7mL 11/10/2021, 10/04/2022    Influenza, FLUZONE High Dose, (age 65 y+), IM, Trivalent PF, 0.5mL 10/10/2016, 10/30/2017, 10/05/2018    Pneumococcal, PCV-13, PREVNAR 13, (age 6w+), IM, 0.5mL 10/05/2015    Pneumococcal, PPSV23, PNEUMOVAX 23, (age 2y+), SC/IM, 0.5mL 09/10/2012    Zoster Live (Zostavax) 2013       Active Problems:  Patient Active Problem List   Diagnosis Code    Hyperlipidemia E78.5    Benign essential HTN  support    Rehab Therapies: Physical Therapy and Occupational Therapy  Weight Bearing Status/Restrictions: No weight bearing restrictions  Other Medical Equipment (for information only, NOT a DME order):  cane and walker  Other Treatments: NA    Patient's personal belongings (please select all that are sent with patient):  None    RN SIGNATURE:  Electronically signed by Marlin Salas RN on 10/12/24 at 1:29 PM EDT    CASE MANAGEMENT/SOCIAL WORK SECTION    Inpatient Status Date: ***    Readmission Risk Assessment Score:  Readmission Risk              Risk of Unplanned Readmission:  0           Discharging to Facility/ Agency   [facility]  Services: Skilled Nursing  [address]  [facility] [address]  [phone]     / signature: Electronically signed by SUMAN Scott on 10/12/24 at 1:43 PM EDT    PHYSICIAN SECTION    Prognosis: Good    Condition at Discharge: Stable    Rehab Potential (if transferring to Rehab): Fair    Recommended Labs or Other Treatments After Discharge:     Physician Certification: I certify the above information and transfer of Ashley Melara  is necessary for the continuing treatment of the diagnosis listed and that she requires Home Care for greater 30 days.     Update Admission H&P: No change in H&P    PHYSICIAN SIGNATURE:  Electronically signed by Marguerite Luque MD on 10/12/24 at 1:12 PM EDT

## 2024-10-12 NOTE — PROGRESS NOTES
IV removed without complication. Tele removed and returned. CMU aware. Discharge instructions reviewed, including updated med list and Stroke Booklet.   Awaiting ride.

## 2024-10-14 ENCOUNTER — CARE COORDINATION (OUTPATIENT)
Dept: CASE MANAGEMENT | Age: 79
End: 2024-10-14

## 2024-10-14 DIAGNOSIS — R26.81 UNSTEADY GAIT: Primary | ICD-10-CM

## 2024-10-14 PROCEDURE — 1111F DSCHRG MED/CURRENT MED MERGE: CPT | Performed by: INTERNAL MEDICINE

## 2024-10-14 NOTE — CARE COORDINATION
Care Transition Nurse reviewed discharge instructions, medical action plan, and red flags with patient. The patient was given an opportunity to ask questions; all questions answered at this time.. The patient verbalized understanding.   Were discharge instructions available to patient? Yes.   Reviewed appropriate site of care based on symptoms and resources available to patient including: PCP  Specialist  Home health  When to call 911. The patient agrees to contact the primary care provider and/or specialist office for questions related to their healthcare.      Advance Care Planning:   Does patient have an Advance Directive: Not on file; patient encouraged to bring existing ACP documents to a [facility] facility..    Advance Care Planning   Healthcare Decision Maker:    Primary Decision Maker: CashjaradProsper R - Spouse - [phone]    Secondary Decision Maker: Michelle Macias Child - [phone]    Click here to complete Healthcare Decision Makers including selection of the Healthcare Decision Maker Relationship (ie \"Primary\").         Medication Reconciliation:  Medication reconciliation was performed with patient,1111F entered: yes.     Remote Patient Monitoring:  Offered patient enrollment in the Remote Patient Monitoring (RPM) program for in-home monitoring: Patient is not eligible for RPM program because: affiliate provider.    Assessments:  Care Transitions 24 Hour Call    Schedule Follow Up Appointment with PCP: Completed  Do you have a copy of your discharge instructions?: Yes  Do you have all of your prescriptions and are they filled?: Yes  Have you been contacted by a [facility] Pharmacist?: No  Have you scheduled your follow up appointment?: Yes  How are you going to get to your appointment?: Car - family or friend to transport  Post Acute Services:  (Comment: [facility] - did not open per patient/family request izabella she was NWB RUE)  Do you have support at home?: Partner/Spouse/SO  Do you feel like you

## 2024-10-16 RX ORDER — PRAVASTATIN SODIUM 40 MG
40 TABLET ORAL DAILY
Qty: 100 TABLET | Refills: 0 | Status: SHIPPED | OUTPATIENT
Start: 2024-10-16

## 2024-10-17 ENCOUNTER — OFFICE VISIT (OUTPATIENT)
Dept: INTERNAL MEDICINE CLINIC | Age: 79
End: 2024-10-17

## 2024-10-17 VITALS
RESPIRATION RATE: 16 BRPM | HEIGHT: 61 IN | WEIGHT: 230 LBS | BODY MASS INDEX: 43.43 KG/M2 | DIASTOLIC BLOOD PRESSURE: 70 MMHG | HEART RATE: 70 BPM | SYSTOLIC BLOOD PRESSURE: 124 MMHG

## 2024-10-17 DIAGNOSIS — R26.81 UNSTEADY GAIT: ICD-10-CM

## 2024-10-17 DIAGNOSIS — Z09 HOSPITAL DISCHARGE FOLLOW-UP: Primary | ICD-10-CM

## 2024-10-17 PROCEDURE — 3078F DIAST BP <80 MM HG: CPT | Performed by: NURSE PRACTITIONER

## 2024-10-17 PROCEDURE — 3074F SYST BP LT 130 MM HG: CPT | Performed by: NURSE PRACTITIONER

## 2024-10-17 PROCEDURE — 1123F ACP DISCUSS/DSCN MKR DOCD: CPT | Performed by: NURSE PRACTITIONER

## 2024-10-17 PROCEDURE — 99214 OFFICE O/P EST MOD 30 MIN: CPT | Performed by: NURSE PRACTITIONER

## 2024-10-17 PROCEDURE — 1111F DSCHRG MED/CURRENT MED MERGE: CPT | Performed by: NURSE PRACTITIONER

## 2024-10-17 PROCEDURE — 1160F RVW MEDS BY RX/DR IN RCRD: CPT | Performed by: NURSE PRACTITIONER

## 2024-10-17 PROCEDURE — 1159F MED LIST DOCD IN RCRD: CPT | Performed by: NURSE PRACTITIONER

## 2024-10-17 NOTE — PROGRESS NOTES
Post-Discharge Transitional Care Follow Up      Ashley Melara   YOB: 1945    Date of Office Visit:  10/17/2024  Date of Hospital Admission: 10/11/24  Date of Hospital Discharge: 10/12/24  Readmission Risk Score (high >=14%. Medium >=10%):No data recorded    Care management risk score Rising risk (score 2-5) and Complex Care (Scores >=6): No Risk Score On File     Non face to face  following discharge, date last encounter closed (first attempt may have been earlier): 10/14/2024     Call initiated 2 business days of discharge: Yes     Hospital discharge follow-up  -     [facility] DISCHARGE MEDS RECONCILED W/ CURRENT OUTPATIENT MED LIST  Unsteady gait      Medical Decision Making: low complexity  No follow-ups on file.    On this date 10/17/2024 I have spent 40 minutes reviewing previous notes, test results and face to face with the patient discussing the diagnosis and importance of compliance with the treatment plan as well as documenting on the day of the visit.       Subjective:   HPI    Inpatient course: Discharge summary reviewed- see chart.    Interval history/Current status: Patient presented to urgent care for feeling off balance, blurred vision.  Started at 1030.  Vision was blurred and she couldn't see a person.  Lost her balance walking in.  She did not fall.   Admitted to [facility] 10/11-10/12/24.     Her glucose went down to 77.  She ate.  Feels okay today.   Having in home physical therapy now.   Hard to say if it is improved or not.     Patient Active Problem List   Diagnosis    Hyperlipidemia    Benign essential HTN    Osteoarthrosis involving lower leg    Paranoid type schizophrenia (HCC)    Achilles tendinitis    Diabetes mellitus, type II (HCC)    Hyperlipidemia associated with type 2 diabetes mellitus (HCC)    Morbid obesity with BMI of 40.0-44.9, adult    Unsteady gait       Medications listed as ordered at the time of discharge from hospital     Medication List            Accurate as of

## 2024-10-18 ENCOUNTER — CARE COORDINATION (OUTPATIENT)
Dept: CASE MANAGEMENT | Age: 79
End: 2024-10-18

## 2024-10-18 NOTE — CARE COORDINATION
Care Transitions Note    Follow Up Call     Patient Current Location:  [place]    Care Transition Nurse contacted the patient by telephone. Verified name and  as identifiers.    Additional needs identified to be addressed with provider   No needs identified                 Method of communication with provider: none.    Care Summary Note: CTN spoke with patient who reported she is doing ok. Patient had follow up with PCP and reported she was instructed to place ear drops in her ear and clean them out d/t some ear wax present upon exam. Patient denied any issues with vision at this time. CTN encouraged patient to notify provider if symptoms return.     Plan of care updates since last contact:  Review of patient management of conditions/medications: .       Advance Care Planning:   Does patient have an Advance Directive: reviewed during previous call, see note. .    Medication Review:  No changes since last call.     Remote Patient Monitoring:  Offered patient enrollment in the Remote Patient Monitoring (RPM) program for in-home monitoring: Patient is not eligible for RPM program because: affiliate provider.    Assessments:  Care Transitions Subsequent and Final Call    Subsequent and Final Calls  Have your medications changed?: No  Do you have any questions related to your medications?: No  Do you currently have any active services?: No  Care Transitions Interventions  Other Interventions:              Follow Up Appointment:   GEENA appointment attended as scheduled   Future Appointments         Provider Specialty Dept Phone    2024 10:30 AM Han Fuentes MD Internal Medicine [phone]            Care Transition Nurse provided contact information.  Plan for follow-up call in 6-10 days based on severity of symptoms and risk factors.  Plan for next call: self management-.      Courtney Rees, MSN, RN, [facility]  Care Transition Nurse  [phone] mobile

## 2024-10-25 ENCOUNTER — CARE COORDINATION (OUTPATIENT)
Dept: CASE MANAGEMENT | Age: 79
End: 2024-10-25

## 2024-10-25 NOTE — CARE COORDINATION
11/18/2024 10:30 AM Han Fuentes MD Internal Medicine [phone]            Care Transition Nurse provided contact information.  Plan for follow-up call in 6-10 days based on severity of symptoms and risk factors.  Plan for next call: self management-.      Courtney Rees, SKYLER, RN, [facility]  Care Transition Nurse  [phone] mobile

## 2024-11-01 ENCOUNTER — CARE COORDINATION (OUTPATIENT)
Dept: CASE MANAGEMENT | Age: 79
End: 2024-11-01

## 2024-11-01 NOTE — CARE COORDINATION
Care Transitions Note    Follow Up Call     Patient Current Location:  [place]    Care Transition Nurse contacted the patient by telephone. Verified name and  as identifiers.    Additional needs identified to be addressed with provider   No needs identified                 Method of communication with provider: none.    Care Summary Note: CTN spoke with patient who reported she is doing ok. Patient feels a little dizzy today and she plans to do her ear drops. Patient feels she is improving just slowly. Patient denied any other issue or concerns at this time.     Plan of care updates since last contact:  Education: .       Advance Care Planning:   Does patient have an Advance Directive: reviewed during previous call, see note. .    Medication Review:  No changes since last call.     Remote Patient Monitoring:  Offered patient enrollment in the Remote Patient Monitoring (RPM) program for in-home monitoring: Patient is not eligible for RPM program because: affiliate provider.    Assessments:  Care Transitions Subsequent and Final Call    Subsequent and Final Calls  Do you have any ongoing symptoms?: No  Have your medications changed?: No  Do you have any questions related to your medications?: No  Do you currently have any active services?: No  Are you currently active with any services?: Home Health  Do you have any needs or concerns that I can assist you with?: No  Identified Barriers: None  Care Transitions Interventions  Other Interventions:              Follow Up Appointment:   Reviewed upcoming appointment(s).  Future Appointments         Provider Specialty Dept Phone    2024 10:30 AM Han Fuentes MD Internal Medicine [phone]            Care Transition Nurse provided contact information.  Plan for follow-up call in 6-10 days based on severity of symptoms and risk factors.  Plan for next call: self management-.      Courtney Rees, MSN, RN, [facility]  Care Transition Nurse  [phone] mobile

## 2024-11-08 ENCOUNTER — CARE COORDINATION (OUTPATIENT)
Dept: CASE MANAGEMENT | Age: 79
End: 2024-11-08

## 2024-11-08 NOTE — CARE COORDINATION
Care Transitions Note    Final Call     Patient Current Location:  [place]    Care Transition Nurse contacted the patient by telephone. Verified name and  as identifiers.    Patient graduated from the Care Transitions program on 24.      Advance Care Planning:   Does patient have an Advance Directive: reviewed during previous call, see note. .    Handoff:   Patient was not referred to the ACM team due to no additional needs identified.       Care Summary Note: CTN spoke with patient who reported she is doing alright. Patient reported she is getting better not 100% but better. Patient still has some dizziness at times and taking it slowly. Patient denied any other issues or concerns at this time.     Assessments:  Care Transitions Subsequent and Final Call    Subsequent and Final Calls  Have your medications changed?: No  Do you have any questions related to your medications?: No  Do you currently have any active services?: No  Are you currently active with any services?: Home Health  Do you have any needs or concerns that I can assist you with?: No  Identified Barriers: None  Care Transitions Interventions  Other Interventions:              Upcoming Appointments:    Future Appointments         Provider Specialty Dept Phone    11/15/2024 9:00 AM Jeana Colin AuD Audiology [phone]    11/15/2024 9:30 AM Mendez Jenkins DO Otolaryngology [phone]    2024 10:30 AM Han Fuentes MD Internal Medicine [phone]            Patient has agreed to contact primary care provider and/or specialist for any further questions, concerns, or needs.    Courtney Rees, MSN, RN, [facility]  Care Transition Nurse  [phone] mobile

## 2024-11-13 ASSESSMENT — ENCOUNTER SYMPTOMS
EYE PAIN: 0
RHINORRHEA: 0
COUGH: 0
NAUSEA: 0
VOMITING: 0
SHORTNESS OF BREATH: 0

## 2024-11-14 NOTE — PROGRESS NOTES
recorded speech stimuli.    Tympanometry: Normal peak pressure with low compliance, Type As tympanogram, consistent with reduced tympanic membrane mobility.      Reliability: Good   Transducer: Supraural headphones      See scanned audiogram dated 11/15/2024  for results.        PATIENT EDUCATION:       The following items were discussed with the patient:   - Good Communication Strategies  - Hearing Loss and Hearing Aids  - Tinnitus Management Strategies      Educational information was shared in the After Visit Summary.                                                RECOMMENDATIONS:                                                                                                                                                                                                                                                            The following items are recommended based on patient report and results from today's appointment:   - Continue medical follow-up with Mendez Jenkins DO.   - Retest hearing as medically indicated and/or sooner if a change in hearing is noted.  - If desired, schedule a Hearing Aid Evaluation (HAE) appointment to discuss hearing aid options.  - Utilize \"Good Communication Strategies\" as discussed to assist in speech understanding with communication partners.  - Maintain a sound enriched environment to assist in the management of tinnitus symptoms.       Bravo Rios  Audiologist    Chart CC'd to: Mendez Jenkins DO      Degree of   Hearing Sensitivity dB Range   Within Normal Limits (WNL) 0 - 20   Mild 20 - 40   Moderate 40 - 55   Moderately-Severe 55 - 70   Severe 70 - 90   Profound 90 +

## 2024-11-15 ENCOUNTER — OFFICE VISIT (OUTPATIENT)
Dept: ENT CLINIC | Age: 79
End: 2024-11-15

## 2024-11-15 ENCOUNTER — PROCEDURE VISIT (OUTPATIENT)
Dept: AUDIOLOGY | Age: 79
End: 2024-11-15

## 2024-11-15 VITALS
WEIGHT: 230 LBS | HEIGHT: 61 IN | HEART RATE: 61 BPM | BODY MASS INDEX: 43.43 KG/M2 | DIASTOLIC BLOOD PRESSURE: 73 MMHG | SYSTOLIC BLOOD PRESSURE: 123 MMHG

## 2024-11-15 DIAGNOSIS — H93.13 TINNITUS OF BOTH EARS: ICD-10-CM

## 2024-11-15 DIAGNOSIS — H90.3 SENSORINEURAL HEARING LOSS, BILATERAL: Primary | ICD-10-CM

## 2024-11-15 DIAGNOSIS — H93.11 TINNITUS OF RIGHT EAR: ICD-10-CM

## 2024-11-15 DIAGNOSIS — H90.3 SENSORINEURAL HEARING LOSS (SNHL) OF BOTH EARS: Primary | ICD-10-CM

## 2024-11-15 DIAGNOSIS — H61.23 BILATERAL IMPACTED CERUMEN: ICD-10-CM

## 2024-11-18 ENCOUNTER — OFFICE VISIT (OUTPATIENT)
Dept: INTERNAL MEDICINE CLINIC | Age: 79
End: 2024-11-18

## 2024-11-18 VITALS
BODY MASS INDEX: 43.61 KG/M2 | WEIGHT: 231 LBS | SYSTOLIC BLOOD PRESSURE: 110 MMHG | RESPIRATION RATE: 12 BRPM | HEART RATE: 70 BPM | DIASTOLIC BLOOD PRESSURE: 80 MMHG | HEIGHT: 61 IN

## 2024-11-18 DIAGNOSIS — N18.31 STAGE 3A CHRONIC KIDNEY DISEASE (HCC): ICD-10-CM

## 2024-11-18 DIAGNOSIS — Z86.73 HISTORY OF CVA (CEREBROVASCULAR ACCIDENT): ICD-10-CM

## 2024-11-18 DIAGNOSIS — M81.0 AGE-RELATED OSTEOPOROSIS WITHOUT CURRENT PATHOLOGICAL FRACTURE: ICD-10-CM

## 2024-11-18 DIAGNOSIS — E11.69 HYPERLIPIDEMIA ASSOCIATED WITH TYPE 2 DIABETES MELLITUS (HCC): ICD-10-CM

## 2024-11-18 DIAGNOSIS — F20.0 PARANOID TYPE SCHIZOPHRENIA (HCC): ICD-10-CM

## 2024-11-18 DIAGNOSIS — E11.9 TYPE 2 DIABETES MELLITUS WITHOUT COMPLICATION, WITHOUT LONG-TERM CURRENT USE OF INSULIN (HCC): ICD-10-CM

## 2024-11-18 DIAGNOSIS — E66.01 MORBID OBESITY WITH BMI OF 40.0-44.9, ADULT: ICD-10-CM

## 2024-11-18 DIAGNOSIS — Z00.00 MEDICARE ANNUAL WELLNESS VISIT, SUBSEQUENT: Primary | ICD-10-CM

## 2024-11-18 DIAGNOSIS — I10 BENIGN ESSENTIAL HTN: ICD-10-CM

## 2024-11-18 DIAGNOSIS — E78.5 HYPERLIPIDEMIA ASSOCIATED WITH TYPE 2 DIABETES MELLITUS (HCC): ICD-10-CM

## 2024-11-18 PROCEDURE — 1123F ACP DISCUSS/DSCN MKR DOCD: CPT | Performed by: INTERNAL MEDICINE

## 2024-11-18 PROCEDURE — 1160F RVW MEDS BY RX/DR IN RCRD: CPT | Performed by: INTERNAL MEDICINE

## 2024-11-18 PROCEDURE — 3079F DIAST BP 80-89 MM HG: CPT | Performed by: INTERNAL MEDICINE

## 2024-11-18 PROCEDURE — 3074F SYST BP LT 130 MM HG: CPT | Performed by: INTERNAL MEDICINE

## 2024-11-18 PROCEDURE — 1159F MED LIST DOCD IN RCRD: CPT | Performed by: INTERNAL MEDICINE

## 2024-11-18 PROCEDURE — G0439 PPPS, SUBSEQ VISIT: HCPCS | Performed by: INTERNAL MEDICINE

## 2024-11-18 PROCEDURE — 3044F HG A1C LEVEL LT 7.0%: CPT | Performed by: INTERNAL MEDICINE

## 2024-11-18 ASSESSMENT — PATIENT HEALTH QUESTIONNAIRE - PHQ9
SUM OF ALL RESPONSES TO PHQ QUESTIONS 1-9: 0
SUM OF ALL RESPONSES TO PHQ9 QUESTIONS 1 & 2: 0
SUM OF ALL RESPONSES TO PHQ QUESTIONS 1-9: 0
2. FEELING DOWN, DEPRESSED OR HOPELESS: NOT AT ALL
1. LITTLE INTEREST OR PLEASURE IN DOING THINGS: NOT AT ALL

## 2024-11-18 NOTE — PROGRESS NOTES
Medicare Annual Wellness Visit    Ashley Melara is here for Medicare AWV    Assessment & Plan   Medicare annual wellness visit, subsequent  Type 2 diabetes mellitus without complication, without long-term current use of insulin (HCC)  -     Hemoglobin A1C; Future  Benign essential HTN  Hyperlipidemia associated with type 2 diabetes mellitus (HCC)  Morbid obesity with BMI of 40.0-44.9, adult  Paranoid type schizophrenia (HCC)  Stage 3a chronic kidney disease (HCC)  Age-related osteoporosis without current pathological fracture  History of CVA (cerebrovascular accident)      Medicare exam done  I reviewed her hospital visit. Showed an old CVA. On ASA and statin.  HTN controlled  HLD at goal  Morbid Obesity  - Body mass index is 43.65 kg/m².  - Complicating assessment and treatment. Placing patient at risk for multiple co-morbidities as well as early death and contributing to the patient's presentation.   - Weight loss would be beneficial    Schizophrenia stable  CKD stage 3 a stable      Recommendations for Preventive Services Due: see orders and patient instructions/AVS.  Recommended screening schedule for the next 5-10 years is provided to the patient in written form: see Patient Instructions/AVS.     No follow-ups on file.     Subjective     She is here for a Medicare exam. She is doing well.    Patient is here for follow up of diabetes, hypertension, hyperlipidemia, OA, schizophrenia and heel pain.  Patient's BGs consistently in an acceptable range.  Patient does not have polydipsia and polyuria. She has been checking her sugar bid. Her sugars are ranging from  mg/dl. Her diabetes is well controlled. She takes amaryl. Rare hypoglycemia. Her vision is getting worse. She does see an eye doctor. No numbness or tingling in her feet.   Patient's BP is controlled on current medications. No chest pain or dyspnea. Med compliant.  Her cholesterol is at goal. No myalgias.  She has mild edema.  She has

## 2024-11-19 ENCOUNTER — TELEPHONE (OUTPATIENT)
Dept: INTERNAL MEDICINE CLINIC | Age: 79
End: 2024-11-19

## 2024-11-19 NOTE — TELEPHONE ENCOUNTER
----- Message from Lorraine MAYERS sent at 11/19/2024  9:47 AM EST -----  Contact: patient [phone]  Patient requesting handicap placard.  Patient will be in the building on Thursday and will  then, if ready.  If not, you can mail it to her.

## 2024-11-21 DIAGNOSIS — E11.9 TYPE 2 DIABETES MELLITUS WITHOUT COMPLICATION, WITHOUT LONG-TERM CURRENT USE OF INSULIN (HCC): ICD-10-CM

## 2024-11-21 LAB
EST. AVERAGE GLUCOSE BLD GHB EST-MCNC: 131.2 MG/DL
HBA1C MFR BLD: 6.2 %

## 2024-12-18 ENCOUNTER — OFFICE VISIT (OUTPATIENT)
Dept: URGENT CARE | Age: 79
End: 2024-12-18

## 2024-12-18 ENCOUNTER — TELEPHONE (OUTPATIENT)
Dept: INTERNAL MEDICINE CLINIC | Age: 79
End: 2024-12-18

## 2024-12-18 VITALS
TEMPERATURE: 98.3 F | WEIGHT: 232.6 LBS | SYSTOLIC BLOOD PRESSURE: 118 MMHG | HEART RATE: 84 BPM | DIASTOLIC BLOOD PRESSURE: 68 MMHG | OXYGEN SATURATION: 97 % | HEIGHT: 61 IN | BODY MASS INDEX: 43.92 KG/M2

## 2024-12-18 DIAGNOSIS — J01.10 ACUTE NON-RECURRENT FRONTAL SINUSITIS: Primary | ICD-10-CM

## 2024-12-18 RX ORDER — CIPROFLOXACIN 500 MG/1
500 TABLET, FILM COATED ORAL 2 TIMES DAILY
Qty: 14 TABLET | Refills: 0 | Status: SHIPPED | OUTPATIENT
Start: 2024-12-18 | End: 2024-12-25

## 2024-12-18 RX ORDER — AZITHROMYCIN 250 MG/1
TABLET, FILM COATED ORAL
Qty: 1 PACKET | Refills: 0 | Status: SHIPPED | OUTPATIENT
Start: 2024-12-18 | End: 2024-12-18 | Stop reason: ALTCHOICE

## 2024-12-18 RX ORDER — ALBUTEROL SULFATE 90 UG/1
2 INHALANT RESPIRATORY (INHALATION) 4 TIMES DAILY PRN
Qty: 18 G | Refills: 0 | Status: SHIPPED | OUTPATIENT
Start: 2024-12-18

## 2024-12-18 ASSESSMENT — ENCOUNTER SYMPTOMS
EYE REDNESS: 0
EYE PAIN: 0
COUGH: 1
VOMITING: 0
ABDOMINAL PAIN: 0
NAUSEA: 0
DIARRHEA: 0
EYE DISCHARGE: 0
SORE THROAT: 0
SHORTNESS OF BREATH: 0

## 2024-12-18 NOTE — PROGRESS NOTES
Temp: 98.3 °F (36.8 °C)   TempSrc: Oral   SpO2: 97%   Weight: 105.5 kg (232 lb 9.6 oz)   Height: 1.549 m (5' 1\")       Review of Systems   Constitutional:  Positive for fatigue. Negative for chills and fever.   HENT:  Positive for congestion. Negative for sore throat.    Eyes:  Negative for pain, discharge, redness and visual disturbance.   Respiratory:  Positive for cough. Negative for shortness of breath.    Cardiovascular:  Negative for chest pain.   Gastrointestinal:  Negative for abdominal pain, diarrhea, nausea and vomiting.   Skin:  Negative for rash.   Neurological:  Negative for dizziness and headaches.   Psychiatric/Behavioral:  Negative for confusion.      Pertinent positives and negatives as above, or may be included within the HPI.    Physical Exam  Vitals and nursing note reviewed.   Constitutional:       General: She is not in acute distress.     Appearance: Normal appearance. She is not ill-appearing, toxic-appearing or diaphoretic.      Interventions: She is not intubated.  HENT:      Head: Normocephalic and atraumatic.      Comments: Pt has tenderness over frontal sinuses     Right Ear: Hearing, ear canal and external ear normal. A middle ear effusion is present. There is no impacted cerumen.      Left Ear: Hearing, ear canal and external ear normal. A middle ear effusion is present. There is no impacted cerumen.      Nose: Congestion present.      Mouth/Throat:      Lips: Pink. No lesions.      Mouth: Mucous membranes are moist. No injury, lacerations, oral lesions or angioedema.      Tongue: No lesions. Tongue does not deviate from midline.      Palate: No mass and lesions.      Pharynx: Oropharynx is clear. Uvula midline. No pharyngeal swelling, oropharyngeal exudate, posterior oropharyngeal erythema, uvula swelling or postnasal drip.      Tonsils: No tonsillar exudate or tonsillar abscesses.   Eyes:      Conjunctiva/sclera: Conjunctivae normal.      Pupils: Pupils are equal, round, and

## 2024-12-18 NOTE — TELEPHONE ENCOUNTER
----- Message from Radha SHEETS sent at 12/18/2024  3:26 PM EST -----  Contact: [phone]  Pt states below medication was sent to pharmacy today and this makes her have complete hearing loss. Pt requesting a different medication. Please advise     azithromycin (ZITHROMAX) 250 MG tablet        [id number] - [place], [place] - [address] - P [phone] - F [phone] (Ph: [phone]

## 2024-12-18 NOTE — TELEPHONE ENCOUNTER
----- Message from Dr. Han Fuentes MD sent at 12/18/2024 12:35 PM EST -----  Contact: GARY [phone]  [employer]  ----- Message -----  From: Hiral Johnson  Sent: 12/18/2024   9:11 AM EST  To: Han Fuentes MD    Patient has been experiencing the following symptoms over the past 7-8 days; cough, chest congestion, post nasal drainage, dark yellow/brown phlegm, headache, chest soreness from coughing fits, and shortness of breath. Covid negative. Patient has been taking Tussin Dm and Asprin, but symptoms do not seem to be improving. Pt requesting a script, sent to the below pharmacy. Please advise       [id number] - [place], [place] - [address] [facility] - P [phone] - F [phone] [address] [address]  Phone: [phone]  Fax: [phone]

## 2024-12-26 ENCOUNTER — HOSPITAL ENCOUNTER (INPATIENT)
Age: 79
LOS: 1 days | Discharge: HOME HEALTH | DRG: 303 | End: 2024-12-27
Attending: EMERGENCY MEDICINE | Admitting: STUDENT IN AN ORGANIZED HEALTH CARE EDUCATION/TRAINING PROGRAM
Payer: MEDICARE

## 2024-12-26 ENCOUNTER — APPOINTMENT (OUTPATIENT)
Dept: CT IMAGING | Age: 79
DRG: 303 | End: 2024-12-26
Payer: MEDICARE

## 2024-12-26 ENCOUNTER — APPOINTMENT (OUTPATIENT)
Dept: GENERAL RADIOLOGY | Age: 79
DRG: 303 | End: 2024-12-26
Payer: MEDICARE

## 2024-12-26 ENCOUNTER — OFFICE VISIT (OUTPATIENT)
Dept: URGENT CARE | Age: 79
End: 2024-12-26

## 2024-12-26 VITALS
BODY MASS INDEX: 43.8 KG/M2 | OXYGEN SATURATION: 98 % | WEIGHT: 232 LBS | TEMPERATURE: 97 F | DIASTOLIC BLOOD PRESSURE: 79 MMHG | HEIGHT: 61 IN | SYSTOLIC BLOOD PRESSURE: 143 MMHG | HEART RATE: 72 BPM

## 2024-12-26 DIAGNOSIS — R06.02 SHORTNESS OF BREATH: ICD-10-CM

## 2024-12-26 DIAGNOSIS — R21 RASH: ICD-10-CM

## 2024-12-26 DIAGNOSIS — R79.89 ELEVATED BRAIN NATRIURETIC PEPTIDE (BNP) LEVEL: Primary | ICD-10-CM

## 2024-12-26 DIAGNOSIS — R06.02 SHORTNESS OF BREATH: Primary | ICD-10-CM

## 2024-12-26 DIAGNOSIS — M79.89 LEG SWELLING: ICD-10-CM

## 2024-12-26 DIAGNOSIS — R60.0 LOWER EXTREMITY EDEMA: ICD-10-CM

## 2024-12-26 PROBLEM — I50.9 HEART FAILURE (HCC): Status: ACTIVE | Noted: 2024-12-26

## 2024-12-26 LAB
ALBUMIN SERPL-MCNC: 4 G/DL (ref 3.4–5)
ALBUMIN/GLOB SERPL: 1.4 {RATIO} (ref 1.1–2.2)
ALP SERPL-CCNC: 96 U/L (ref 40–129)
ALT SERPL-CCNC: 21 U/L (ref 10–40)
ANION GAP SERPL CALCULATED.3IONS-SCNC: 9 MMOL/L (ref 3–16)
AST SERPL-CCNC: 32 U/L (ref 15–37)
BASOPHILS # BLD: 0.1 K/UL (ref 0–0.2)
BASOPHILS NFR BLD: 0.5 %
BILIRUB SERPL-MCNC: 0.3 MG/DL (ref 0–1)
BUN SERPL-MCNC: 8 MG/DL (ref 7–20)
CALCIUM SERPL-MCNC: 9.2 MG/DL (ref 8.3–10.6)
CHLORIDE SERPL-SCNC: 93 MMOL/L (ref 99–110)
CO2 SERPL-SCNC: 30 MMOL/L (ref 21–32)
CREAT SERPL-MCNC: 0.8 MG/DL (ref 0.6–1.2)
D-DIMER QUANTITATIVE: 1.59 UG/ML FEU (ref 0–0.6)
DEPRECATED RDW RBC AUTO: 16.4 % (ref 12.4–15.4)
EKG ATRIAL RATE: 65 BPM
EKG DIAGNOSIS: NORMAL
EKG P AXIS: 26 DEGREES
EKG P-R INTERVAL: 178 MS
EKG Q-T INTERVAL: 392 MS
EKG QRS DURATION: 72 MS
EKG QTC CALCULATION (BAZETT): 407 MS
EKG R AXIS: 15 DEGREES
EKG T AXIS: 18 DEGREES
EKG VENTRICULAR RATE: 65 BPM
EOSINOPHIL # BLD: 0.2 K/UL (ref 0–0.6)
EOSINOPHIL NFR BLD: 2.2 %
FLUAV RNA RESP QL NAA+PROBE: NOT DETECTED
FLUBV RNA RESP QL NAA+PROBE: NOT DETECTED
GFR SERPLBLD CREATININE-BSD FMLA CKD-EPI: 75 ML/MIN/{1.73_M2}
GLUCOSE BLD-MCNC: 115 MG/DL (ref 70–99)
GLUCOSE BLD-MCNC: 55 MG/DL (ref 70–99)
GLUCOSE SERPL-MCNC: 141 MG/DL (ref 70–99)
HCT VFR BLD AUTO: 35.7 % (ref 36–48)
HGB BLD-MCNC: 11.9 G/DL (ref 12–16)
LYMPHOCYTES # BLD: 1.2 K/UL (ref 1–5.1)
LYMPHOCYTES NFR BLD: 11.3 %
MCH RBC QN AUTO: 27.4 PG (ref 26–34)
MCHC RBC AUTO-ENTMCNC: 33.3 G/DL (ref 31–36)
MCV RBC AUTO: 82.2 FL (ref 80–100)
MONOCYTES # BLD: 0.6 K/UL (ref 0–1.3)
MONOCYTES NFR BLD: 6 %
NEUTROPHILS # BLD: 8.3 K/UL (ref 1.7–7.7)
NEUTROPHILS NFR BLD: 80 %
NT-PROBNP SERPL-MCNC: 420 PG/ML (ref 0–449)
PERFORMED ON: ABNORMAL
PERFORMED ON: ABNORMAL
PLATELET # BLD AUTO: 375 K/UL (ref 135–450)
PMV BLD AUTO: 6.7 FL (ref 5–10.5)
POTASSIUM SERPL-SCNC: 3.6 MMOL/L (ref 3.5–5.1)
PROT SERPL-MCNC: 6.8 G/DL (ref 6.4–8.2)
RBC # BLD AUTO: 4.34 M/UL (ref 4–5.2)
SARS-COV-2 RNA RESP QL NAA+PROBE: NOT DETECTED
SODIUM SERPL-SCNC: 132 MMOL/L (ref 136–145)
TROPONIN, HIGH SENSITIVITY: 16 NG/L (ref 0–14)
TROPONIN, HIGH SENSITIVITY: 18 NG/L (ref 0–14)
WBC # BLD AUTO: 10.3 K/UL (ref 4–11)

## 2024-12-26 PROCEDURE — 71260 CT THORAX DX C+: CPT

## 2024-12-26 PROCEDURE — 85025 COMPLETE CBC W/AUTO DIFF WBC: CPT

## 2024-12-26 PROCEDURE — 96372 THER/PROPH/DIAG INJ SC/IM: CPT

## 2024-12-26 PROCEDURE — 6360000002 HC RX W HCPCS: Performed by: STUDENT IN AN ORGANIZED HEALTH CARE EDUCATION/TRAINING PROGRAM

## 2024-12-26 PROCEDURE — 84484 ASSAY OF TROPONIN QUANT: CPT

## 2024-12-26 PROCEDURE — G0378 HOSPITAL OBSERVATION PER HR: HCPCS

## 2024-12-26 PROCEDURE — 84443 ASSAY THYROID STIM HORMONE: CPT

## 2024-12-26 PROCEDURE — 1200000000 HC SEMI PRIVATE

## 2024-12-26 PROCEDURE — 87636 SARSCOV2 & INF A&B AMP PRB: CPT

## 2024-12-26 PROCEDURE — 74177 CT ABD & PELVIS W/CONTRAST: CPT

## 2024-12-26 PROCEDURE — 83550 IRON BINDING TEST: CPT

## 2024-12-26 PROCEDURE — 93005 ELECTROCARDIOGRAM TRACING: CPT

## 2024-12-26 PROCEDURE — 83880 ASSAY OF NATRIURETIC PEPTIDE: CPT

## 2024-12-26 PROCEDURE — 36415 COLL VENOUS BLD VENIPUNCTURE: CPT

## 2024-12-26 PROCEDURE — 6370000000 HC RX 637 (ALT 250 FOR IP): Performed by: STUDENT IN AN ORGANIZED HEALTH CARE EDUCATION/TRAINING PROGRAM

## 2024-12-26 PROCEDURE — 83540 ASSAY OF IRON: CPT

## 2024-12-26 PROCEDURE — 99285 EMERGENCY DEPT VISIT HI MDM: CPT

## 2024-12-26 PROCEDURE — 85379 FIBRIN DEGRADATION QUANT: CPT

## 2024-12-26 PROCEDURE — 71045 X-RAY EXAM CHEST 1 VIEW: CPT

## 2024-12-26 PROCEDURE — 96375 TX/PRO/DX INJ NEW DRUG ADDON: CPT

## 2024-12-26 PROCEDURE — 6360000004 HC RX CONTRAST MEDICATION: Performed by: EMERGENCY MEDICINE

## 2024-12-26 PROCEDURE — 96374 THER/PROPH/DIAG INJ IV PUSH: CPT

## 2024-12-26 PROCEDURE — 2500000003 HC RX 250 WO HCPCS: Performed by: STUDENT IN AN ORGANIZED HEALTH CARE EDUCATION/TRAINING PROGRAM

## 2024-12-26 PROCEDURE — 93010 ELECTROCARDIOGRAM REPORT: CPT | Performed by: INTERNAL MEDICINE

## 2024-12-26 PROCEDURE — 82728 ASSAY OF FERRITIN: CPT

## 2024-12-26 PROCEDURE — 80053 COMPREHEN METABOLIC PANEL: CPT

## 2024-12-26 RX ORDER — VALSARTAN 80 MG/1
160 TABLET ORAL DAILY
Status: DISCONTINUED | OUTPATIENT
Start: 2024-12-26 | End: 2024-12-27 | Stop reason: HOSPADM

## 2024-12-26 RX ORDER — HYDROCHLOROTHIAZIDE 25 MG/1
25 TABLET ORAL DAILY
Status: DISCONTINUED | OUTPATIENT
Start: 2024-12-26 | End: 2024-12-27 | Stop reason: HOSPADM

## 2024-12-26 RX ORDER — BUSPIRONE HYDROCHLORIDE 5 MG/1
10 TABLET ORAL 2 TIMES DAILY
Status: DISCONTINUED | OUTPATIENT
Start: 2024-12-26 | End: 2024-12-27 | Stop reason: HOSPADM

## 2024-12-26 RX ORDER — AMLODIPINE BESYLATE 5 MG/1
5 TABLET ORAL DAILY
Status: DISCONTINUED | OUTPATIENT
Start: 2024-12-26 | End: 2024-12-27

## 2024-12-26 RX ORDER — POTASSIUM CHLORIDE 7.45 MG/ML
10 INJECTION INTRAVENOUS PRN
Status: DISCONTINUED | OUTPATIENT
Start: 2024-12-26 | End: 2024-12-27 | Stop reason: HOSPADM

## 2024-12-26 RX ORDER — FUROSEMIDE 10 MG/ML
40 INJECTION INTRAMUSCULAR; INTRAVENOUS 2 TIMES DAILY
Status: DISCONTINUED | OUTPATIENT
Start: 2024-12-26 | End: 2024-12-27

## 2024-12-26 RX ORDER — ACETAMINOPHEN 650 MG/1
650 SUPPOSITORY RECTAL EVERY 6 HOURS PRN
Status: DISCONTINUED | OUTPATIENT
Start: 2024-12-26 | End: 2024-12-27 | Stop reason: HOSPADM

## 2024-12-26 RX ORDER — ONDANSETRON 4 MG/1
4 TABLET, ORALLY DISINTEGRATING ORAL EVERY 8 HOURS PRN
Status: DISCONTINUED | OUTPATIENT
Start: 2024-12-26 | End: 2024-12-27 | Stop reason: HOSPADM

## 2024-12-26 RX ORDER — ASPIRIN 81 MG/1
81 TABLET ORAL DAILY
Status: DISCONTINUED | OUTPATIENT
Start: 2024-12-26 | End: 2024-12-27 | Stop reason: HOSPADM

## 2024-12-26 RX ORDER — POTASSIUM CHLORIDE 1500 MG/1
40 TABLET, EXTENDED RELEASE ORAL PRN
Status: DISCONTINUED | OUTPATIENT
Start: 2024-12-26 | End: 2024-12-27 | Stop reason: HOSPADM

## 2024-12-26 RX ORDER — VALSARTAN AND HYDROCHLOROTHIAZIDE 160; 25 MG/1; MG/1
1 TABLET ORAL DAILY
Status: DISCONTINUED | OUTPATIENT
Start: 2024-12-26 | End: 2024-12-26 | Stop reason: SDUPTHER

## 2024-12-26 RX ORDER — SODIUM CHLORIDE 0.9 % (FLUSH) 0.9 %
5-40 SYRINGE (ML) INJECTION PRN
Status: DISCONTINUED | OUTPATIENT
Start: 2024-12-26 | End: 2024-12-27 | Stop reason: HOSPADM

## 2024-12-26 RX ORDER — ONDANSETRON 2 MG/ML
4 INJECTION INTRAMUSCULAR; INTRAVENOUS EVERY 6 HOURS PRN
Status: DISCONTINUED | OUTPATIENT
Start: 2024-12-26 | End: 2024-12-27 | Stop reason: HOSPADM

## 2024-12-26 RX ORDER — SODIUM CHLORIDE 0.9 % (FLUSH) 0.9 %
5-40 SYRINGE (ML) INJECTION EVERY 12 HOURS SCHEDULED
Status: DISCONTINUED | OUTPATIENT
Start: 2024-12-26 | End: 2024-12-27 | Stop reason: HOSPADM

## 2024-12-26 RX ORDER — GLUCAGON 1 MG/ML
1 KIT INJECTION PRN
Status: DISCONTINUED | OUTPATIENT
Start: 2024-12-26 | End: 2024-12-27 | Stop reason: HOSPADM

## 2024-12-26 RX ORDER — SODIUM CHLORIDE 9 MG/ML
INJECTION, SOLUTION INTRAVENOUS PRN
Status: DISCONTINUED | OUTPATIENT
Start: 2024-12-26 | End: 2024-12-27 | Stop reason: HOSPADM

## 2024-12-26 RX ORDER — MAGNESIUM SULFATE IN WATER 40 MG/ML
2000 INJECTION, SOLUTION INTRAVENOUS PRN
Status: DISCONTINUED | OUTPATIENT
Start: 2024-12-26 | End: 2024-12-27 | Stop reason: HOSPADM

## 2024-12-26 RX ORDER — PRAVASTATIN SODIUM 40 MG
40 TABLET ORAL DAILY
Status: DISCONTINUED | OUTPATIENT
Start: 2024-12-26 | End: 2024-12-27 | Stop reason: HOSPADM

## 2024-12-26 RX ORDER — CARVEDILOL 3.12 MG/1
3.12 TABLET ORAL 2 TIMES DAILY WITH MEALS
Status: DISCONTINUED | OUTPATIENT
Start: 2024-12-26 | End: 2024-12-27 | Stop reason: HOSPADM

## 2024-12-26 RX ORDER — INSULIN LISPRO 100 [IU]/ML
0-4 INJECTION, SOLUTION INTRAVENOUS; SUBCUTANEOUS
Status: DISCONTINUED | OUTPATIENT
Start: 2024-12-26 | End: 2024-12-27 | Stop reason: HOSPADM

## 2024-12-26 RX ORDER — PRAVASTATIN SODIUM 40 MG
40 TABLET ORAL DAILY
Qty: 100 TABLET | Refills: 2 | Status: SHIPPED | OUTPATIENT
Start: 2024-12-26

## 2024-12-26 RX ORDER — RISPERIDONE 0.25 MG/1
0.5 TABLET ORAL DAILY
Status: DISCONTINUED | OUTPATIENT
Start: 2024-12-26 | End: 2024-12-27 | Stop reason: HOSPADM

## 2024-12-26 RX ORDER — IOPAMIDOL 755 MG/ML
75 INJECTION, SOLUTION INTRAVASCULAR
Status: COMPLETED | OUTPATIENT
Start: 2024-12-26 | End: 2024-12-26

## 2024-12-26 RX ORDER — ACETAMINOPHEN 325 MG/1
650 TABLET ORAL EVERY 6 HOURS PRN
Status: DISCONTINUED | OUTPATIENT
Start: 2024-12-26 | End: 2024-12-27 | Stop reason: HOSPADM

## 2024-12-26 RX ORDER — ENOXAPARIN SODIUM 100 MG/ML
30 INJECTION SUBCUTANEOUS 2 TIMES DAILY
Status: DISCONTINUED | OUTPATIENT
Start: 2024-12-26 | End: 2024-12-27 | Stop reason: HOSPADM

## 2024-12-26 RX ORDER — DEXTROSE MONOHYDRATE 100 MG/ML
INJECTION, SOLUTION INTRAVENOUS CONTINUOUS PRN
Status: DISCONTINUED | OUTPATIENT
Start: 2024-12-26 | End: 2024-12-27 | Stop reason: HOSPADM

## 2024-12-26 RX ORDER — POLYETHYLENE GLYCOL 3350 17 G/17G
17 POWDER, FOR SOLUTION ORAL DAILY PRN
Status: DISCONTINUED | OUTPATIENT
Start: 2024-12-26 | End: 2024-12-27 | Stop reason: HOSPADM

## 2024-12-26 RX ADMIN — BUSPIRONE HYDROCHLORIDE 10 MG: 5 TABLET ORAL at 20:43

## 2024-12-26 RX ADMIN — ONDANSETRON 4 MG: 2 INJECTION, SOLUTION INTRAMUSCULAR; INTRAVENOUS at 23:28

## 2024-12-26 RX ADMIN — ENOXAPARIN SODIUM 30 MG: 100 INJECTION SUBCUTANEOUS at 19:57

## 2024-12-26 RX ADMIN — FUROSEMIDE 40 MG: 10 INJECTION, SOLUTION INTRAMUSCULAR; INTRAVENOUS at 20:04

## 2024-12-26 RX ADMIN — ACETAMINOPHEN 650 MG: 325 TABLET ORAL at 23:28

## 2024-12-26 RX ADMIN — CARVEDILOL 3.12 MG: 3.12 TABLET, FILM COATED ORAL at 20:05

## 2024-12-26 RX ADMIN — Medication 10 ML: at 19:57

## 2024-12-26 RX ADMIN — IOPAMIDOL 75 ML: 755 INJECTION, SOLUTION INTRAVENOUS at 16:35

## 2024-12-26 RX ADMIN — RISPERIDONE 0.5 MG: 0.25 TABLET, FILM COATED ORAL at 20:43

## 2024-12-26 ASSESSMENT — PAIN DESCRIPTION - DESCRIPTORS: DESCRIPTORS: ACHING

## 2024-12-26 ASSESSMENT — ENCOUNTER SYMPTOMS
EYE DISCHARGE: 0
ABDOMINAL PAIN: 0
DIARRHEA: 0
EYE REDNESS: 0
EYE PAIN: 0
SORE THROAT: 0
VOMITING: 0
NAUSEA: 0
SHORTNESS OF BREATH: 1
COUGH: 1

## 2024-12-26 ASSESSMENT — PAIN - FUNCTIONAL ASSESSMENT
PAIN_FUNCTIONAL_ASSESSMENT: NONE - DENIES PAIN
PAIN_FUNCTIONAL_ASSESSMENT: NONE - DENIES PAIN
PAIN_FUNCTIONAL_ASSESSMENT: 0-10

## 2024-12-26 ASSESSMENT — PAIN SCALES - GENERAL
PAINLEVEL_OUTOF10: 3
PAINLEVEL_OUTOF10: 0

## 2024-12-26 ASSESSMENT — PAIN DESCRIPTION - ORIENTATION: ORIENTATION: MID

## 2024-12-26 ASSESSMENT — PAIN DESCRIPTION - LOCATION: LOCATION: HEAD

## 2024-12-26 ASSESSMENT — LIFESTYLE VARIABLES
HOW MANY STANDARD DRINKS CONTAINING ALCOHOL DO YOU HAVE ON A TYPICAL DAY: PATIENT DOES NOT DRINK
HOW OFTEN DO YOU HAVE A DRINK CONTAINING ALCOHOL: NEVER

## 2024-12-26 NOTE — PROGRESS NOTES
Ashley Melara (: 1945) is a 79 y.o. female, Established patient, here for evaluation of the following chief complaint(s):  Shortness of Breath (Pt states was here on the 18th and has gotten better but has been out SOB and has been worse today. Pt states wants to follow up from last visit. No cough this morning but some congestion.) and Care Coordination (It is suggested that the pt go to the ER for further evaluation of shortness of breath)      ASSESSMENT/PLAN:    ICD-10-CM    1. Shortness of breath  R06.02       2. Leg swelling  M79.89       3. Rash  R21         - It is suggested that the pt go to the ER for further evaluation of shortness of breath with lower leg swelling and rash  - Pt understands and agrees to go to the ER for further evaluation of shortness of breath. Pt would like to go by private vehicle with  driving her.      Urgent Care Triage to ED:       Ashley Melara (:  1945) is a 79 y.o. female,Established patient, here for evaluation of the following :  Chief Complaint   Patient presents with    Shortness of Breath     Pt states was here on the 18th and has gotten better but has been out SOB and has been worse today. Pt states wants to follow up from last visit. No cough this morning but some congestion.    Care Coordination     It is suggested that the pt go to the ER for further evaluation of shortness of breath   :     Objective      Vitals:    24 1032   BP: (!) 143/79   Pulse: 72   Temp: 97 °F (36.1 °C)   TempSrc: Oral   SpO2: 98%   Weight: 105.2 kg (232 lb)   Height: 1.549 m (5' 1\")         Assessment & Plan      The patient was evaluated for: shortness of breath with possible cardiac abnormailty as differential diagnosis.       Due to the complexity of patient's concerns, medical risks, and potential high risk for  complications, I recommend patient to be evaluated in the emergency room today.   The risks have been explained to the patient, including

## 2024-12-26 NOTE — H&P
Additional Contrast?->None Decision Support Exception - unselect if not a suspected or confirmed emergency medical condition->Emergency Medical Condition (MA) Reason for Exam: loewr extremity edma, right lower side pain Relevant Medical/Surgical History: Hysterectomy FINDINGS: CTA CHEST: Pulmonary Arteries: Pulmonary arteries are adequately opacified for evaluation.  No evidence of intraluminal filling defect to suggest pulmonary embolism.  Main pulmonary artery is normal in caliber. Mediastinum: The thoracic aorta is normal in caliber with mild atherosclerosis.  No acute aortic abnormality identified.  Coronary artery atherosclerotic vascular calcifications are seen.  No acute abnormality in the branch vessels of the superior mediastinum and lower neck.  No cardiomegaly or pericardial effusion.  Moderate hiatal hernia.  There is a 3.8 cm cystic lesion in the inferior mediastinum to the right of the hiatal hernia with a small amount of dense layering internal material.  No associated stranding or gas.  The mediastinal esophagus and thyroid gland are unremarkable.  No lymphadenopathy or pneumomediastinum. Lungs/pleura: The central airways are patent.  No pleural effusion or pneumothorax.  No consolidation or interlobular septal thickening.  Mild left basilar atelectasis. Soft Tissues/Bones: No acute bone or soft tissue abnormality. CT ABDOMEN AND PELVIS Organs: The liver is unremarkable.  Cholelithiasis without evidence of acute cholecystitis or biliary ductal dilatation.  The pancreas, spleen, and bilateral adrenal glands are unremarkable.  Simple appearing cysts in the right kidney measuring up to 7.3 cm.  No follow-up recommended.  The left kidney is unremarkable.  No obstructive uropathy. GI/Bowel: Colonic diverticulosis without evidence of acute diverticulitis. Normal appendix.  No obstruction or wall thickening identified.  Ovoid fat density lesion lateral to the proximal sigmoid colon without significant  stranding. Pelvis: The urinary bladder is normal in appearance.  Status post hysterectomy.  No pelvic or inguinal lymphadenopathy.  No free fluid in the pelvis. Peritoneum/Retroperitoneum: The abdominal aorta is normal in caliber with moderate atherosclerosis.  No acute vascular abnormality identified.  No retroperitoneal or mesenteric lymphadenopathy is identified.  No free air or fluid is seen in the abdomen. Bones/Soft Tissues: No acute osseous or soft tissue abnormality.     1. No pulmonary embolism or other acute process in the chest, abdomen, or pelvis. 2. Moderate hiatal hernia. 3. 3.8 cm cystic lesion in the inferior mediastinum to the right of the hiatal hernia with a small amount of dense layering internal material. This is of uncertain etiology but likely benign.  It may represent a gastric diverticulum or enteric duplication cyst. 4. Cholelithiasis without evidence of acute cholecystitis. 5. Colonic diverticulosis without evidence of acute diverticulitis.     CT CHEST PULMONARY EMBOLISM W CONTRAST    Result Date: 12/26/2024  EXAMINATION: CTA OF THE CHEST 12/26/2024 3:20 pm TECHNIQUE: CTA of the chest was performed after the administration of intravenous contrast.  Multiplanar reformatted images are provided for review.  MIP images are provided for review. Automated exposure control, iterative reconstruction, and/or weight based adjustment of the mA/kV was utilized to reduce the radiation dose to as low as reasonably achievable. COMPARISON: None. HISTORY: ORDERING SYSTEM PROVIDED HISTORY: SOB TECHNOLOGIST PROVIDED HISTORY: Reason for exam:->SOB Additional Contrast?->1 Reason for Exam: SOB and coughing, Relevant Medical/Surgical History: no surgery FINDINGS: Pulmonary Arteries: Pulmonary arteries are adequately opacified for evaluation.  No evidence of intraluminal filling defect to suggest pulmonary embolism.  Main pulmonary artery is normal in caliber. Mediastinum: No evidence of mediastinal

## 2024-12-26 NOTE — ED PROVIDER NOTES
I independently examined and evaluated Ashley Melara.    In brief, patient is a 79yoF who presents to the ED for evaluation of shortness of breath. Bilatearl lower extremity edema that is worsening over the past few days. Bnp 400s. On exam, noted to have bilateral lower extremity edema. CT shows no PE. Admit for further evaluation and treatment.     All diagnostic, treatment, and disposition decisions were made by myself in conjunction with the advanced practice provider/resident physician.     I personally saw the patient and performed a substantive portion of the visit including aspects of the medical decision making. I approved management plan and take responsibility for the patient management.     I personally saw the patient and independently provided 0 minutes of non-concurrent critical care out of the total shared critical care time provided.    Comment: Please note this report has been produced using speech recognition software and may contain errors related to that system including errors in grammar, punctuation, and spelling, as well as words and phrases that may be inappropriate. If there are any questions or concerns please feel free to contact the dictating provider for clarification.    For all further details of the patient's emergency department visit, please see the advanced practice provider's documentation.        Liliana Mccauley MD  12/29/24 [id number]

## 2024-12-27 VITALS
HEIGHT: 61 IN | WEIGHT: 226.7 LBS | TEMPERATURE: 97.7 F | SYSTOLIC BLOOD PRESSURE: 136 MMHG | OXYGEN SATURATION: 96 % | DIASTOLIC BLOOD PRESSURE: 76 MMHG | BODY MASS INDEX: 42.8 KG/M2 | RESPIRATION RATE: 17 BRPM | HEART RATE: 67 BPM

## 2024-12-27 LAB
ALBUMIN SERPL-MCNC: 3.4 G/DL (ref 3.4–5)
ALP SERPL-CCNC: 81 U/L (ref 40–129)
ALT SERPL-CCNC: 16 U/L (ref 10–40)
ANION GAP SERPL CALCULATED.3IONS-SCNC: 8 MMOL/L (ref 3–16)
AST SERPL-CCNC: 26 U/L (ref 15–37)
BASOPHILS # BLD: 0.1 K/UL (ref 0–0.2)
BASOPHILS NFR BLD: 0.6 %
BILIRUB DIRECT SERPL-MCNC: <0.1 MG/DL (ref 0–0.3)
BILIRUB INDIRECT SERPL-MCNC: ABNORMAL MG/DL (ref 0–1)
BILIRUB SERPL-MCNC: <0.2 MG/DL (ref 0–1)
BUN SERPL-MCNC: 8 MG/DL (ref 7–20)
CALCIUM SERPL-MCNC: 8.9 MG/DL (ref 8.3–10.6)
CHLORIDE SERPL-SCNC: 94 MMOL/L (ref 99–110)
CHOLEST SERPL-MCNC: 122 MG/DL (ref 0–199)
CO2 SERPL-SCNC: 30 MMOL/L (ref 21–32)
CREAT SERPL-MCNC: 0.8 MG/DL (ref 0.6–1.2)
DEPRECATED RDW RBC AUTO: 15.8 % (ref 12.4–15.4)
EOSINOPHIL # BLD: 0.2 K/UL (ref 0–0.6)
EOSINOPHIL NFR BLD: 2.6 %
FERRITIN SERPL IA-MCNC: 19.4 NG/ML (ref 15–150)
FERRITIN SERPL IA-MCNC: 20 NG/ML (ref 15–150)
GFR SERPLBLD CREATININE-BSD FMLA CKD-EPI: 75 ML/MIN/{1.73_M2}
GLUCOSE BLD-MCNC: 179 MG/DL (ref 70–99)
GLUCOSE BLD-MCNC: 88 MG/DL (ref 70–99)
GLUCOSE SERPL-MCNC: 71 MG/DL (ref 70–99)
HCT VFR BLD AUTO: 33.5 % (ref 36–48)
HDLC SERPL-MCNC: 62 MG/DL (ref 40–60)
HGB BLD-MCNC: 11 G/DL (ref 12–16)
IRON SATN MFR SERPL: 6 % (ref 15–50)
IRON SATN MFR SERPL: 6 % (ref 15–50)
IRON SERPL-MCNC: 23 UG/DL (ref 37–145)
IRON SERPL-MCNC: 24 UG/DL (ref 37–145)
LDLC SERPL CALC-MCNC: 44 MG/DL
LYMPHOCYTES # BLD: 1.5 K/UL (ref 1–5.1)
LYMPHOCYTES NFR BLD: 15.6 %
MAGNESIUM SERPL-MCNC: 2.07 MG/DL (ref 1.8–2.4)
MCH RBC QN AUTO: 27 PG (ref 26–34)
MCHC RBC AUTO-ENTMCNC: 32.7 G/DL (ref 31–36)
MCV RBC AUTO: 82.6 FL (ref 80–100)
MONOCYTES # BLD: 0.8 K/UL (ref 0–1.3)
MONOCYTES NFR BLD: 8.5 %
NEUTROPHILS # BLD: 6.8 K/UL (ref 1.7–7.7)
NEUTROPHILS NFR BLD: 72.7 %
PERFORMED ON: ABNORMAL
PERFORMED ON: NORMAL
PLATELET # BLD AUTO: 331 K/UL (ref 135–450)
PMV BLD AUTO: 6.8 FL (ref 5–10.5)
POTASSIUM SERPL-SCNC: 3.7 MMOL/L (ref 3.5–5.1)
PROT SERPL-MCNC: 6 G/DL (ref 6.4–8.2)
RBC # BLD AUTO: 4.06 M/UL (ref 4–5.2)
SODIUM SERPL-SCNC: 132 MMOL/L (ref 136–145)
TIBC SERPL-MCNC: 371 UG/DL (ref 260–445)
TIBC SERPL-MCNC: 411 UG/DL (ref 260–445)
TRIGL SERPL-MCNC: 78 MG/DL (ref 0–150)
TSH SERPL DL<=0.005 MIU/L-ACNC: 0.89 UIU/ML (ref 0.27–4.2)
TSH SERPL DL<=0.005 MIU/L-ACNC: 1.64 UIU/ML (ref 0.27–4.2)
VLDLC SERPL CALC-MCNC: 16 MG/DL
WBC # BLD AUTO: 9.3 K/UL (ref 4–11)

## 2024-12-27 PROCEDURE — 82728 ASSAY OF FERRITIN: CPT

## 2024-12-27 PROCEDURE — 84443 ASSAY THYROID STIM HORMONE: CPT

## 2024-12-27 PROCEDURE — 97535 SELF CARE MNGMENT TRAINING: CPT

## 2024-12-27 PROCEDURE — 97166 OT EVAL MOD COMPLEX 45 MIN: CPT

## 2024-12-27 PROCEDURE — 85025 COMPLETE CBC W/AUTO DIFF WBC: CPT

## 2024-12-27 PROCEDURE — 97162 PT EVAL MOD COMPLEX 30 MIN: CPT

## 2024-12-27 PROCEDURE — 97530 THERAPEUTIC ACTIVITIES: CPT

## 2024-12-27 PROCEDURE — 83540 ASSAY OF IRON: CPT

## 2024-12-27 PROCEDURE — 80076 HEPATIC FUNCTION PANEL: CPT

## 2024-12-27 PROCEDURE — G0378 HOSPITAL OBSERVATION PER HR: HCPCS

## 2024-12-27 PROCEDURE — 80061 LIPID PANEL: CPT

## 2024-12-27 PROCEDURE — 83735 ASSAY OF MAGNESIUM: CPT

## 2024-12-27 PROCEDURE — 6370000000 HC RX 637 (ALT 250 FOR IP): Performed by: STUDENT IN AN ORGANIZED HEALTH CARE EDUCATION/TRAINING PROGRAM

## 2024-12-27 PROCEDURE — 6370000000 HC RX 637 (ALT 250 FOR IP): Performed by: INTERNAL MEDICINE

## 2024-12-27 PROCEDURE — 36415 COLL VENOUS BLD VENIPUNCTURE: CPT

## 2024-12-27 PROCEDURE — 83550 IRON BINDING TEST: CPT

## 2024-12-27 PROCEDURE — 80048 BASIC METABOLIC PNL TOTAL CA: CPT

## 2024-12-27 PROCEDURE — 97116 GAIT TRAINING THERAPY: CPT

## 2024-12-27 RX ORDER — VALSARTAN 320 MG/1
320 TABLET ORAL DAILY
Qty: 90 TABLET | Refills: 1 | Status: SHIPPED | OUTPATIENT
Start: 2024-12-27

## 2024-12-27 RX ORDER — HYDROCHLOROTHIAZIDE 25 MG/1
25 TABLET ORAL EVERY MORNING
Qty: 90 TABLET | Refills: 1 | Status: SHIPPED | OUTPATIENT
Start: 2024-12-27

## 2024-12-27 RX ORDER — FERROUS SULFATE 325(65) MG
325 TABLET ORAL 2 TIMES DAILY WITH MEALS
Status: DISCONTINUED | OUTPATIENT
Start: 2024-12-27 | End: 2024-12-27 | Stop reason: HOSPADM

## 2024-12-27 RX ORDER — FERROUS SULFATE 325(65) MG
325 TABLET ORAL 2 TIMES DAILY WITH MEALS
Qty: 60 TABLET | Refills: 3 | Status: SHIPPED | OUTPATIENT
Start: 2024-12-27

## 2024-12-27 RX ADMIN — VALSARTAN 160 MG: 80 TABLET, FILM COATED ORAL at 10:16

## 2024-12-27 RX ADMIN — PRAVASTATIN SODIUM 40 MG: 40 TABLET ORAL at 10:16

## 2024-12-27 RX ADMIN — ASPIRIN 81 MG: 81 TABLET, COATED ORAL at 10:17

## 2024-12-27 RX ADMIN — BUSPIRONE HYDROCHLORIDE 10 MG: 5 TABLET ORAL at 10:17

## 2024-12-27 RX ADMIN — FERROUS SULFATE TAB 325 MG (65 MG ELEMENTAL FE) 325 MG: 325 (65 FE) TAB at 10:22

## 2024-12-27 RX ADMIN — HYDROCHLOROTHIAZIDE 25 MG: 25 TABLET ORAL at 10:17

## 2024-12-27 NOTE — PROGRESS NOTES
Pt ok for discharge per MD. Discharge instructions given. IV removed. Telemetry monitor removed and CMU notified. No questions or concerns at this time. Transported by wheelchair to personal car with all belongings.

## 2024-12-27 NOTE — PROGRESS NOTES
Occupational Therapy  Facility/Department: [place] [address] [facility]  Occupational Therapy Initial Assessment/ Treatment Note     Name: Ashley Melara  : 1945  MRN: [id number]  Date of Service: 2024    Discharge Recommendations:  24 hour supervision or assist, Home with Home health OT  OT Equipment Recommendations  Equipment Needed: No       Patient Diagnosis(es): The primary encounter diagnosis was Elevated brain natriuretic peptide (BNP) level. Diagnoses of Lower extremity edema and Shortness of breath were also pertinent to this visit.  Past Medical History:  has a past medical history of Closed displaced fracture of greater tuberosity of right humerus, COPD (chronic obstructive pulmonary disease) ([facility]), Hyperlipidemia, Hyperlipidemia associated with type 2 diabetes mellitus ([facility]), Hypertension, Morbid obesity with BMI of 40.0-44.9, adult, Osteoarthrosis, not specified whether generalized/localized, lower leg, Paranoid type schizophrenia ([facility]), Syncope and collapse, Type 2 diabetes mellitus without complication ([facility]), and Type II or unspecified type diabetes mellitus without mention of complication, not stated as uncontrolled.  Past Surgical History:  has a past surgical history that includes joint replacement; blepharoplasty (5/10/2012); and Colonoscopy (2007).    Assessment  Performance deficits / Impairments: Decreased functional mobility ;Decreased ADL status;Decreased strength;Decreased endurance;Decreased balance  Assessment: 80 y/o female presents to [facility] acute heart failure. Pt lives w/ spouse in one-story home w/ level entry and IPTA. Pt currently completing bed mobility SUPV, functional transfers/ mobility SBA, LB ADLs grossly SBA and standing grooming ADLs SUPV. Pt received CHF education this date - pt verbalized understanding but would benefit from continued reinforcement 2/2 first introduction to information. Pt is functioning slightly below baseline and would benefit from  skilled OT services to improve functional IND and safety. OT rec d/c home 24/7 + HHOT, cont acute OT.   Prognosis: Fair  Decision Making: Medium Complexity  REQUIRES OT FOLLOW-UP: Yes  Activity Tolerance  Activity Tolerance: Patient Tolerated treatment well;Patient limited by fatigue     Plan  Occupational Therapy Plan  Times Per Week: x2-3/week  Current Treatment Recommendations: Strengthening, Balance training, Functional mobility training, Endurance training, Pain management, Safety education & training, Patient/Caregiver education & training, Self-Care / ADL, Positioning    Restrictions  Restrictions/Precautions  Restrictions/Precautions: General Precautions  Activity Level: Up with Assist  Required Braces or Orthoses?: No  Position Activity Restriction  Other Position/Activity Restrictions: tele    Subjective  General  Chart Reviewed: Yes  Patient assessed for rehabilitation services?: Yes  Referring Practitioner: Avelino Villalobos DO  Diagnosis: heart failure  Subjective  Subjective: pt supine in bed on approach, pleasant and agreeable to session. RN approved     Social/Functional History  Social/Functional History  Lives With: Spouse  Type of Home: House  Home Layout: One level (with basement)  Home Access: Level entry  Bathroom Shower/Tub: Walk-in shower  Bathroom Toilet: Handicap height  Bathroom Equipment: Grab bars in shower, Built-in shower seat, Grab bars around toilet, Hand-held shower  Home Equipment: Cane, Walker - Standard (BSC)  Has the patient had two or more falls in the past year or any fall with injury in the past year?: No  Prior Level of Assist for ADLs: Needs assistance (needs occasional help with lower body dressing)  Bath: Independent  Grooming: Independent  Feeding: Independent  Toileting: Independent  Prior Level of Assist for Homemaking: Needs assistance (spouse helps with cleaning)  Homemaking Responsibilities: Yes  Prior Level of Assist for Ambulation: Independent household ambulator,

## 2024-12-27 NOTE — PROGRESS NOTES
Physical Therapy  Facility/Department: [place] [address] REMOTE TELEMETRY  Physical Therapy Initial Assessment/Treatment     Name: Ashley Melara  : 1945  MRN: [id number]  Date of Service: 2024    Discharge Recommendations:  24 hour supervision or assist, Home with Home health PT   PT Equipment Recommendations  Equipment Needed: No      Patient Diagnosis(es): The primary encounter diagnosis was Elevated brain natriuretic peptide (BNP) level. Diagnoses of Lower extremity edema and Shortness of breath were also pertinent to this visit.  Past Medical History:  has a past medical history of Closed displaced fracture of greater tuberosity of right humerus, COPD (chronic obstructive pulmonary disease) (HCC), Hyperlipidemia, Hyperlipidemia associated with type 2 diabetes mellitus (HCC), Hypertension, Morbid obesity with BMI of 40.0-44.9, adult, Osteoarthrosis, not specified whether generalized/localized, lower leg, Paranoid type schizophrenia (HCC), Syncope and collapse, Type 2 diabetes mellitus without complication (HCC), and Type II or unspecified type diabetes mellitus without mention of complication, not stated as uncontrolled.  Past Surgical History:  has a past surgical history that includes joint replacement; blepharoplasty (5/10/2012); and Colonoscopy (2007).    Assessment  Body Structures, Functions, Activity Limitations Requiring Skilled Therapeutic Intervention: Decreased functional mobility ;Decreased endurance;Decreased strength;Decreased body mechanics;Decreased safe awareness;Decreased balance;Decreased posture  Assessment: Pt seen for PT evaluation following admission for SOB, found to bed in A/C CHF exacerbation. Prior to admission, pt was living with spouse in single level home with no [place] and reports being independent with mobility with  no AD. Pt currently performing bed mobility with supv, transfers and gait up to 50 ft with no AD. CHF education initiated and pt verbalizes understanding  shower  Bathroom Toilet: Handicap height  Bathroom Equipment: Grab bars in shower, Built-in shower seat, Grab bars around toilet, Hand-held shower  Home Equipment: Cane, Walker - Standard (BSC)  Has the patient had two or more falls in the past year or any fall with injury in the past year?: No  Prior Level of Assist for ADLs: Needs assistance (needs occasional help with lower body dressing)  Bath: Independent  Grooming: Independent  Feeding: Independent  Toileting: Independent  Prior Level of Assist for Homemaking: Needs assistance (spouse helps with cleaning)  Homemaking Responsibilities: Yes  Prior Level of Assist for Ambulation: Independent household ambulator, with or without device (no AD)  Prior Level of Assist for Transfers: Independent  Active : No  Patient's  Info:  mostly drives  Vision/Hearing  Vision  Vision: Impaired  Vision Exceptions: Wears glasses for distance (troubles with peripheral vision)  Hearing  Hearing: Exceptions to WFL  Hearing Exceptions: Hard of hearing/hearing concerns (minor)    Cognition   Orientation  Overall Orientation Status: Within Functional Limits  Orientation Level: Oriented X4    Objective  Temp: 97.7 °F (36.5 °C)  Pulse: 67  Heart Rate Source: Monitor  Respirations: 16  SpO2: 96 %  O2 Device: None (Room air)  BP: 136/76  MAP (Calculated): 96  BP Location: Left upper arm  BP Method: Automatic  Patient Position: High fowlers     Observation/Palpation  Posture: Fair  Edema: BLE and BUE edema, mild pitting  Gross Assessment  AROM: Within functional limits  Strength: Generally decreased, functional  Sensation: Intact (denies numbness/tingling)                Bed Mobility Training  Bed Mobility Training: Yes  Overall Level of Assistance: Supervision  Interventions: Verbal cues;Safety awareness training  Supine to Sit: Supervision  Sit to Supine: Other (comment) (pt left in chair, not assessed this date)  Scooting: Supervision (EOB)  Balance  Sitting:

## 2024-12-27 NOTE — CARE COORDINATION
functional level: Independent in ADLs/IADLs    PT AM-PAC:   /24  OT AM-PAC:   /24    Family can provide assistance at DC: Yes  Would you like Case Management to discuss the discharge plan with any other family members/significant others, and if so, who? No  Plans to Return to Present Housing: Yes  Other Identified Issues/Barriers to RETURNING to current housing: None  Potential Assistance needed at discharge: Home Care ([facility])            Potential DME:    Patient expects to discharge to: House  Plan for transportation at discharge: Family    Financial    Payor: [facility] MEDICARE / Plan: [facility] MEDICARE ADVANTAGE / Product Type: *No Product type* /     Does insurance require precert for SNF: Yes    Potential assistance Purchasing Medications: No  Meds-to-Beds request:        [facility] - [place], [place] - [address] [facility] -  [phone] - F [phone]  [address] [address]  Phone: [phone] Fax: [phone]    [id number] - [place], [place] - [address] - P [phone] - F [phone]  [address]  [facility] [address]  Phone: [phone] Fax: [phone]    [id number] - [place], [place] - [address] [facility] - P [phone] - F [phone]  [address] [facility] [address]  Phone: [phone] Fax: [phone]    [facility] - [place], [place] - [address] [place] [address] - P [phone] - F [phone]  [address]  [address]  [facility] [address]  Phone: [phone] Fax: [phone]    [id number] - [place], [place] - [address] [facility] [phone] - F [phone]  [address] [facility] [address]  Phone: [phone] Fax: [phone]      Notes:    Factors facilitating achievement of predicted outcomes: Family support, Cooperative, and Pleasant    Barriers to discharge: Decreased endurance    Additional Case Management Notes: IPTA Lives in ranch style home with a  basement (she does not go there). Has standard walker, 2 canes and BSC. Patient has been active with [facility] in the past and has resume orders. No other needs at this time. Will follow    The Plan for Transition of Care is related to the following treatment goals of Shortness of breath [R06.02]  Lower extremity edema [R60.0]  Heart failure (HCC) [I50.9]  Elevated brain natriuretic peptide (BNP) level [R79.89]    IF APPLICABLE: The Patient and/or patient representative Ashley and her family were provided with a choice of provider and agrees with the discharge plan. Freedom of choice list with basic dialogue that supports the patient's individualized plan of care/goals and shares the quality data associated with the providers was provided to:     Patient Representative Name:       The Patient and/or Patient Representative Agree with the Discharge Plan?      Rachel Sanchez RN  Case Management Department  Ph: [phone]

## 2024-12-27 NOTE — ED PROVIDER NOTES
[facility]LE[facility]  Emergency Department Encounter    Patient Name: Ashley Melara  MRN: [id number]  YOB: 1945  Date of Evaluation: 12/26/2024  Provider: Han Fuentes MD  Note Started: 10:32 PM EST 12/26/24    CHIEF COMPLAINT  Shortness of Breath (Pt was seen at urgent care two times in the last two weeks for shortness of breath and a cough. )    SHARED SERVICE VISIT   I have seen and evaluated this patient with my supervising physician, Dr. Mccauley.    HISTORY OF PRESENT ILLNESS  History From: Patient    Limitations to history : None    Social Determinants Significantly Affecting Health : None    Ashley Melara is a 79 y.o. female who presents to the ED for evaluation of shortness of breath onset today.  Patient states that 1 week ago she was having persistent coughing with significant mucus production.  Patient states that she was seen at urgent care at that time and was diagnosed with sinusitis and was started on ciprofloxacin and was given albuterol inhaler.  Patient states that she has taken this antibiotic and her cough is improved however she states that this morning she woke up and noticed that she was feeling short of breath.  Patient does admit to lower extremity edema which has been seemingly worsening recently.  Patient states that she is attempted to follow-up with her primary care provider however she is not able to get an appointment.  Patient states that she decided to follow-up at urgent care again today however was advised to come here to the ED for further evaluation and management.  Patient denies fever, chills, body aches, headache, sore throat, nasal congestion, ear pain, chest pain, abdominal pain, nausea, vomiting, changes in bowels, dysuria, hematuria, neck pain, back pain, lightheadedness, and rash.    No other complaints, modifying factors or associated symptoms.     Nursing notes reviewed were all reviewed and agreed with or any disagreements were  abdomen pelvis, reevaluation, and disposition. [JM]      ED Course User Index  [JM] Ajay Nascimento PA-C  [TT] Karine Jackson, DO       Is this patient to be included in the SEP-1 Core Measure due to severe sepsis or septic shock?   No   Exclusion criteria - the patient is NOT to be included for SEP-1 Core Measure due to:  2+ SIRS criteria are not met    DDx:  COVID, flu, viral illness, bronchitis, pneumonia, sepsis, pharyngitis, tonsillitis, sinusitis, otitis media, ACS, NSTEMI, cardiac arrhythmia, PE, DVT, pneumothorax, aortic dissection, anemia, electrolyte abnormality, EUGENIA, and liver disease.    FINAL IMPRESSION  1. Elevated brain natriuretic peptide (BNP) level    2. Lower extremity edema    3. Shortness of breath      Patient referred to:  No follow-up provider specified.    Discharge Medications:   Current Discharge Medication List        Discontinued Medications:  Current Discharge Medication List        DISPOSITION:  Patient was admitted.    (Please note that portions of this note were completed with a voice recognition program.  Efforts were made to edit the dictations but occasionally words are mis-transcribed).          Ajay Nascimento PA-C  12/26/24 2083

## 2024-12-27 NOTE — PATIENT INSTRUCTIONS
- It is suggested that the pt go to the ER for further evaluation of shortness of breath with lower leg swelling and rash

## 2024-12-27 NOTE — PLAN OF CARE
CHF Care Plan      Patient's EF (Ejection Fraction) is greater than 40%    Heart Failure Medications:  Diuretics:: Furosemide    (One of the following REQUIRED for EF </= 40%/SYSTOLIC FAILURE but MAY be used in EF% >40%/DIASTOLIC FAILURE)        ACE:: None        ARB:valsartan        ARNI:: None    (Beta Blockers)  NON- Evidenced Based Beta Blocker (for EF% >40%/DIASTOLIC FAILURE): None    Evidenced Based Beta Blocker::(REQUIRED for EF% <40%/SYSTOLIC FAILURE) Carvedilol- Coreg  ...................................................................................................................................................    Failed to redirect to the Timeline version of the OffSite VISION SmartLink.      Patient's weights and intake/output reviewed    Daily Weight log at bedside, patient/family participation in use of log: \"yes    Patient's current weight today shows a difference of 0   lbs less than last documented weight.      Intake/Output Summary (Last 24 hours) at 12/26/2024 2125  Last data filed at 12/26/2024 2120  Gross per 24 hour   Intake 270 ml   Output 500 ml   Net -230 ml       Education Booklet Provided: yes    Comorbidities Reviewed Yes    Patient has a past medical history of Closed displaced fracture of greater tuberosity of right humerus, COPD (chronic obstructive pulmonary disease) (HCC), Hyperlipidemia, Hyperlipidemia associated with type 2 diabetes mellitus (HCC), Hypertension, Morbid obesity with BMI of 40.0-44.9, adult, Osteoarthrosis, not specified whether generalized/localized, lower leg, Paranoid type schizophrenia (HCC), Syncope and collapse, Type 2 diabetes mellitus without complication (HCC), and Type II or unspecified type diabetes mellitus without mention of complication, not stated as uncontrolled.     >>For CHF and Comorbidity documentation on Education Time and Topics, please see Education Tab      CHF Education    Learners:  Patient  Readineess:   Acceptance  Method:    Explanation  Response:    Verbalizes Understanding    Comments: none    Time Spent: 15 mins        Pt sitting in bed at this time on room air. Pt denies shortness of breath. Pt with pitting lower extremity edema.     Patient and/or Family's stated Goal of Care this Admission: reduce shortness of breath, increase activity tolerance, better understand heart failure and disease management, be more comfortable, and reduce lower extremity edema prior to discharge        :

## 2024-12-27 NOTE — ED NOTES
Ashley Melara is a 79 y.o. female admitted for  Principal Problem:    Heart failure (HCC)  Resolved Problems:    * No resolved hospital problems. *  .   Patient Home via family with   Chief Complaint   Patient presents with    Shortness of Breath     Pt was seen at urgent care two times in the last two weeks for shortness of breath and a cough.    .  Patient is alert and Person, Place, Time, and Situation  Patient's baseline mobility: Baseline Mobility: Independent   Code Status: Full Code   Cardiac Rhythm:       Is patient on baseline Oxygen: yes, RA how many Liters: RA   Abnormal Assessment Findings: BLE edema, SOB with exertion    Isolation: none      NIH Score:    C-SSRS: Risk of Suicide: No Risk  Bedside swallow:        Active LDA's:   Peripheral IV 12/26/24 Left Antecubital (Active)   Site Assessment Clean, dry & intact 12/26/24 1308   Line Status Normal saline locked;Brisk blood return;Specimen collected 12/26/24 1308   Phlebitis Assessment No symptoms 12/26/24 1308   Infiltration Assessment 0 12/26/24 1308   Alcohol Cap Used Yes 12/26/24 1308   Dressing Status Clean, dry & intact 12/26/24 1308   Dressing Type Transparent 12/26/24 1308     Patient admitted with a diaz: no If the diaz is chronic was it exchanged:NA  Reason for diaz: N/A  Patient admitted with Central Line:  NA . PICC line placement confirmed: YES OR NO:[id number]}   Reason for Central line: N/A  Was central line Inserted from an outside facility: n/A       Family/Caregiver Present no Any Concerns: no   Restraints no  Sitter no         Vitals: MEWS Score: 1    Vitals:    12/26/24 1823 12/26/24 1836 12/26/24 1911 12/26/24 1921   BP: (!) 151/80 (!) 151/69 (!) 155/72 (!) 148/79   Pulse: 76 74 73 78   Resp: 20 22 26 19   Temp:       TempSrc:       SpO2: 99% 99% 100% 100%   Weight:       Height:           Last documented pain score (0-10 scale) Pain Level: 0  Pain medication administered No.    Pertinent or High Risk Medications/Drips:

## 2024-12-27 NOTE — PLAN OF CARE
Problem: Chronic Conditions and Co-morbidities  Goal: Patient's chronic conditions and co-morbidity symptoms are monitored and maintained or improved  Outcome: Completed     Problem: Discharge Planning  Goal: Discharge to home or other facility with appropriate resources  Outcome: Completed     Problem: Safety - Adult  Goal: Free from fall injury  Outcome: Completed     Problem: Skin/Tissue Integrity  Goal: Absence of new skin breakdown  Description: 1.  Monitor for areas of redness and/or skin breakdown  2.  Assess vascular access sites hourly  3.  Every 4-6 hours minimum:  Change oxygen saturation probe site  4.  Every 4-6 hours:  If on nasal continuous positive airway pressure, respiratory therapy assess nares and determine need for appliance change or resting period.  Outcome: Completed

## 2024-12-27 NOTE — CARE COORDINATION
CASE MANAGEMENT DISCHARGE SUMMARY      Discharge to: Home with [facility]    Precertification completed: N/A  Hospital Exemption Notification (HENS) completed: N/A    IMM given: (date) 12/26/24    New Durable Medical Equipment ordered/agency: N/A    Transportation:    Family/car: Spouse/Private car       Confirmed discharge plan with: Home with family and [facility] for skilled nursing and PT     Patient: yes     Family:  yes    Name: Prosper  Contact number: [phone]     Facility/Agency, name: [facility] can pull from Epic MATTHIAS/AVS does not need to be faxed        RN, name: Rachel    Note: Discharging nurse to complete MATTHIAS, reconcile AVS, and place final copy with patient's discharge packet. RN to ensure that written prescriptions for  Level II medications are sent with patient to the facility as per protocol.    .Rachel Sanchez RN

## 2024-12-27 NOTE — DISCHARGE INSTRUCTIONS
Follow up with PCP within 1-2 weeks.  Avoid salty foods.  Elevate your legs higher than your heart for 30 minutes twice per day.  Have your PCP get you tested for sleep apnea if you haven't already - this can cause leg swelling and shortness of breath.  Follow up with you primary doctor to make sure you have had a recent colonoscopy - you have a low iron level and people with low iron levels should have a recent colonoscopy to make sure they aren't slowing losing blood from a colon tumor.

## 2024-12-27 NOTE — PLAN OF CARE
Patient oriented to room and call light. Updated on plan of care. Patient verbalized understanding of care plan currently. Tele on. Bed in lowest position. All questions answered.

## 2024-12-27 NOTE — DISCHARGE SUMMARY
Discharge Summary    Name:  Ashley Melara /Age/Sex: 1945 (79 y.o. female)   Admit Date: 2024  Discharge Date: 24   MRN & CSN:  [id number] & [id number] Encounter Date and Time 24 9:23 AM EST    Attending:  Jostin Roth MD Discharging Provider: JOSTIN ROTH MD       Hospital Course:       79 Y F with a BMI of 43 and HTN, HLD, DM2, remote CVA, and paranoid schizophrenia.  She was recently treated by her PCP and a couple urgent cares for URI symptoms - primarily cough.  Eventually the urgent care sent her to the ED because they noticed swollen legs.  The patient says he legs have been swollen for years.  In our ED she had a normal BNP.  CXR and CTPA did not show pulmonary edema.  She denies orthopnea.  No rales on exam.  Labs and vitals were otherwise reassuring.  My colleague admitted her for CHF.    CHF clinically ruled out.  More like obesity-related venous stasis.  Elevate, compress, low sodium diet, outpatient polysomnography.  PCP can consider outpatient TTE if there are further concerns.  - stopped amlodipine in case this is contributing.     Pneumonia ruled out.  Negative flu and COVID as well.    Do not suspect this is anginal equivalent in this case.    PE ruled out.    AECOPD ruled out.  She isn't wheezing.  Hardly coughing during my prolonged visit.  She never smoked.  Minimal second-hand smoke.  Not sure how COPD made it onto her problem list.     Her daughter thinks that her schizophrenia and anxiety may be influencing the way she experiences her symptoms.  She might be right.  Risperidone, buspirone.      HTN.  Increased valsartan in order to make up for the lack of amlodipine.  Continue HCTZ.    DM2.  Recent A1c 6.2.  I encouraged her to stop her glimepiride and pioglitazone and start empagliflozin instead - this might help with her leg swelling.  She and her family are going to think it over, but they wanted me to send the prescription to the  sent to [id number] - [place], [place] - [address] - P [phone] - F [phone] [address] [place] [address]      Phone: [phone]   empagliflozin 10 MG tablet  ferrous sulfate 325 (65 Fe) MG tablet  hydroCHLOROthiazide 25 MG tablet  valsartan 320 MG tablet                Labs and Imaging   CT ABDOMEN PELVIS W IV CONTRAST Additional Contrast? None    Result Date: 12/26/2024  EXAMINATION: CT OF THE ABDOMEN AND PELVIS WITH CONTRAST 12/26/2024 TECHNIQUE: CT of the abdomen and pelvis was performed with the administration of intravenous contrast. Multiplanar reformatted images are provided for review. Automated exposure control, iterative reconstruction, and/or weight based adjustment of the mA/kV was utilized to reduce the radiation dose to as low as reasonably achievable. COMPARISON: Chest radiograph 12/26/2024. HISTORY: ORDERING SYSTEM PROVIDED HISTORY: lower extremity edema TECHNOLOGIST PROVIDED HISTORY: Reason for exam:->lower extremity edema Additional Contrast?->None Decision Support Exception - unselect if not a suspected or confirmed emergency medical condition->Emergency Medical Condition (MA) Reason for Exam: loewr extremity edma, right lower side pain Relevant Medical/Surgical History: Hysterectomy FINDINGS: CTA CHEST: Pulmonary Arteries: Pulmonary arteries are adequately opacified for evaluation.  No evidence of intraluminal filling defect to suggest pulmonary embolism.  Main pulmonary artery is normal in caliber. Mediastinum: The thoracic aorta is normal in caliber with mild atherosclerosis.  No acute aortic abnormality identified.  Coronary artery atherosclerotic vascular calcifications are seen.  No acute abnormality in the branch vessels of the superior mediastinum and lower neck.  No cardiomegaly or pericardial effusion.  Moderate hiatal hernia.  There is a 3.8 cm cystic lesion in the inferior mediastinum to the right of the hiatal hernia with a small amount of dense

## 2024-12-27 NOTE — DISCHARGE INSTR - COC
Continuity of Care Form    Patient Name: Ashley Melara   :  1945  MRN:  [id number]    Admit date:  2024  Discharge date:  ***    Code Status Order: Full Code   Advance Directives:   Advance Care Flowsheet Documentation             Admitting Physician:  Avelino Villalobos DO  PCP: Han Fuentes MD    Discharging Nurse: ***  Discharging Hospital Unit/Room#: 0104/0104-01  Discharging Unit Phone Number: ***    Emergency Contact:   Extended Emergency Contact Information  Primary Emergency Contact: Michelle Macias  Mobile Phone: [phone]  Relation: Child  Secondary Emergency Contact: Prosper Melara  Address: [address] [place] [address]            [place] [address] [facility]  Home Phone: [phone]  Mobile Phone: [phone]  Relation: Spouse    Past Surgical History:  Past Surgical History:   Procedure Laterality Date    BLEPHAROPLASTY  5/10/2012    COLONOSCOPY  2007    JOINT REPLACEMENT         Immunization History:   Immunization History   Administered Date(s) Administered    COVID-19, MODERNA BLUE border, Primary or Immunocompromised, (age 12y+), IM, 100 mcg/0.5mL 2021, 2021, 2021    COVID-19, MODERNA Bivalent, (age 12y+), IM, 50 mcg/0.5 mL 10/14/2022    COVID-19, PFIZER, (age 12y+), IM, 30mcg/0.3mL 2023    Influenza 2011, 09/10/2012, 2013    Influenza Virus Vaccine 2014, 10/05/2015, 2019, 10/05/2020    Influenza Whole 2010    Influenza, FLUZONE High Dose (age 65 y+), IM, Quadv, 0.7mL 11/10/2021, 10/04/2022    Influenza, FLUZONE High Dose, (age 65 y+), IM, Trivalent PF, 0.5mL 10/10/2016, 10/30/2017, 10/05/2018    Pneumococcal, PCV-13, PREVNAR 13, (age 6w+), IM, 0.5mL 10/05/2015    Pneumococcal, PPSV23, PNEUMOVAX 23, (age 2y+), SC/IM, 0.5mL 09/10/2012    Zoster Live (Zostavax) 2013       Active Problems:  Patient Active Problem List   Diagnosis Code    Hyperlipidemia E78.5    Benign essential HTN I10     certify the above information and transfer of Ashley Melara  is necessary for the continuing treatment of the diagnosis listed and that she requires Home Care for less 30 days.     Update Admission H&P: No change in H&P    PHYSICIAN SIGNATURE:  Electronically signed by JOSTIN SERNA MD on 12/27/24 at 10:03 AM EST

## 2024-12-30 ENCOUNTER — CARE COORDINATION (OUTPATIENT)
Dept: CASE MANAGEMENT | Age: 79
End: 2024-12-30

## 2024-12-30 DIAGNOSIS — I10 BENIGN ESSENTIAL HTN: Primary | ICD-10-CM

## 2024-12-30 PROCEDURE — 1111F DSCHRG MED/CURRENT MED MERGE: CPT | Performed by: INTERNAL MEDICINE

## 2024-12-30 NOTE — CARE COORDINATION
Care Transitions Note    Initial Call - Call within 2 business days of discharge: Yes    Patient Current Location:  Home: [address] [place] [address]   [facility] [address]    Care Transition Nurse contacted the patient by telephone to perform post hospital discharge assessment, verified name and  as identifiers. Provided introduction to self, and explanation of the Care Transition Nurse role.     Patient: Ashley Melara  Patient : 1945   MRN: [id number]    Reason for Admission: MHA x 20 hours -> obesity related venous stasis, HTN, DM2, HLD, incidental iron deficiency; ruled out-CHF, PNA, PE -> home w/ [facility] SN PT OT, f/up PCP for referral for colonoscopy  Discharge Date: 24  RURS: Readmission Risk Score: 14.1    Last Discharge Facility       Date Complaint Diagnosis Description Type Department Provider    24 Shortness of Breath Elevated brain natriuretic peptide (BNP) level ... ED to Hosp-Admission (Discharged) (ADMITTED) M[facility]Z A1 Carrillo Roth MD; Clifton Mccauley...     Was this an external facility discharge? No    Additional needs identified to be addressed with provider   No needs identified         Method of communication with provider: none.    Patients top risk factors for readmission: medical condition-     Interventions to address risk factors:   Review of patient management of conditions/medications:      Care Summary Note:   Feeling well today. Elevated LE last night for 30 min. Has a home exercise plan but hasn't started yet. BS 90s with lunch. She is able to move around her home and work on tasks. Had one episode of lightheadedness and SOB. [facility] only able [facility] 7 days and order in more. Taking iron one hour prior to meals on empty stomach. Tolerating without constipation so far because her stool is currently loose but not diarrhea. She does take prn Benefiber for constipation. She did take a Fleet enema once since home. Discussed Miralax or Colace both OTC

## 2025-01-06 ENCOUNTER — CARE COORDINATION (OUTPATIENT)
Dept: CASE MANAGEMENT | Age: 80
End: 2025-01-06

## 2025-01-06 NOTE — CARE COORDINATION
Care Transitions Note    Follow Up Call     Patient: Ashley Melara               Patient : 1945   MRN: [id number]                             Reason for Admission: MHA x 20 hours -> obesity related venous stasis, HTN, DM2, HLD, incidental iron deficiency; ruled out-CHF, PNA, PE -> home w/ [facility] SN PT OT, f/up PCP for referral for colonoscopy  Discharge Date: 24     RURS: Readmission Risk Score: 14.1    Patient Current Location:  Home: [address] [place] [address] [place] [address]    Care Transition Nurse contacted the patient by telephone. Verified name and  as identifiers.    Additional needs identified to be addressed with provider   No needs identified         Method of communication with provider: none.    Care Summary Note:   Feeling well. HC therapy started and she expects 2nd visit on Wednesday. Trying to work on her home exercises in the meantime but also able to do her tasks around the home. Has been elevating LE daily for about 30 min but states goal is BID. Monitoring VS, weight, BS daily. She is not yet baseline but feeling improvement. Denies needs. Sees PCP assoc next week. Denies needs at this time.     Plan of care updates since last contact:  Review of patient management of conditions/medications:       Advance Care Planning:   Does patient have an Advance Directive: reviewed during previous call, see note. .    Medication Review:  No changes since last call.     Remote Patient Monitoring:  Offered patient enrollment in the Remote Patient Monitoring (RPM) program for in-home monitoring: did not enroll.    Assessments:  No changes since last call    Follow Up Appointment:   Reviewed upcoming appointment(s).    Future Appointments         Provider Specialty Dept Phone    2025 2:00 PM Elvia Boyd APRN - CNP Internal Medicine [phone]    2025 11:30 AM Han Fuentes MD Internal Medicine [phone]          Care Transition Nurse provided contact

## 2025-01-14 ENCOUNTER — OFFICE VISIT (OUTPATIENT)
Dept: INTERNAL MEDICINE CLINIC | Age: 80
End: 2025-01-14

## 2025-01-14 VITALS
SYSTOLIC BLOOD PRESSURE: 122 MMHG | WEIGHT: 218 LBS | DIASTOLIC BLOOD PRESSURE: 72 MMHG | BODY MASS INDEX: 41.16 KG/M2 | HEIGHT: 61 IN | RESPIRATION RATE: 16 BRPM | HEART RATE: 77 BPM

## 2025-01-14 DIAGNOSIS — F20.0 PARANOID TYPE SCHIZOPHRENIA (HCC): ICD-10-CM

## 2025-01-14 DIAGNOSIS — E78.5 HYPERLIPIDEMIA ASSOCIATED WITH TYPE 2 DIABETES MELLITUS (HCC): ICD-10-CM

## 2025-01-14 DIAGNOSIS — D64.9 ANEMIA, UNSPECIFIED TYPE: Primary | ICD-10-CM

## 2025-01-14 DIAGNOSIS — Z09 HOSPITAL DISCHARGE FOLLOW-UP: ICD-10-CM

## 2025-01-14 DIAGNOSIS — E11.69 HYPERLIPIDEMIA ASSOCIATED WITH TYPE 2 DIABETES MELLITUS (HCC): ICD-10-CM

## 2025-01-14 DIAGNOSIS — N18.31 STAGE 3A CHRONIC KIDNEY DISEASE (HCC): ICD-10-CM

## 2025-01-14 DIAGNOSIS — R60.0 BILATERAL LOWER EXTREMITY EDEMA: ICD-10-CM

## 2025-01-14 PROCEDURE — 99215 OFFICE O/P EST HI 40 MIN: CPT | Performed by: NURSE PRACTITIONER

## 2025-01-14 PROCEDURE — 3074F SYST BP LT 130 MM HG: CPT | Performed by: NURSE PRACTITIONER

## 2025-01-14 PROCEDURE — 1111F DSCHRG MED/CURRENT MED MERGE: CPT | Performed by: NURSE PRACTITIONER

## 2025-01-14 PROCEDURE — 3078F DIAST BP <80 MM HG: CPT | Performed by: NURSE PRACTITIONER

## 2025-01-14 PROCEDURE — 1124F ACP DISCUSS-NO DSCNMKR DOCD: CPT | Performed by: NURSE PRACTITIONER

## 2025-01-14 RX ORDER — ALBUTEROL SULFATE 0.83 MG/ML
2.5 SOLUTION RESPIRATORY (INHALATION) 4 TIMES DAILY PRN
Qty: 120 EACH | Refills: 3 | Status: SHIPPED | OUTPATIENT
Start: 2025-01-14

## 2025-01-14 RX ORDER — FLUCONAZOLE 150 MG/1
150 TABLET ORAL ONCE
Qty: 1 TABLET | Refills: 0 | Status: SHIPPED | OUTPATIENT
Start: 2025-01-14 | End: 2025-01-14

## 2025-01-14 NOTE — PROGRESS NOTES
Post-Discharge Transitional Care Follow Up      Ashley Melara   YOB: 1945    Date of Office Visit:  1/14/2025  Date of Hospital Admission: 12/26/24  Date of Hospital Discharge: 12/27/24  Readmission Risk Score (high >=14%. Medium >=10%):Readmission Risk Score: 14.1      Care management risk score Rising risk (score 2-5) and Complex Care (Scores >=6): No Risk Score On File     Non face to face  following discharge, date last encounter closed (first attempt may have been earlier): *No documented post hospital discharge outreach found in the last 14 days     Call initiated 2 business days of discharge: *No response recorded in the last 14 days     Anemia, unspecified type  -     AFL - Arvin Negron MD, Gastroenterology (ERCP & EUS), [facility] discharge follow-up  -     SD DISCHARGE MEDS RECONCILED W/ CURRENT OUTPATIENT MED LIST  -     Richard Hahn MD, Sleep Medicine, [facility]  Bilateral lower extremity edema  Stage 3a chronic kidney disease (HCC)  Paranoid type schizophrenia (HCC)  Hyperlipidemia associated with type 2 diabetes mellitus (HCC)      Medical Decision Making: moderate complexity  Return if symptoms worsen or fail to improve.    On this date 1/14/2025 I have spent 45 minutes reviewing previous notes, test results and face to face with the patient discussing the diagnosis and importance of compliance with the treatment plan as well as documenting on the day of the visit.       Subjective:   HPI    Inpatient course: Discharge summary reviewed- see chart.    Interval history/Current status: Patient presents for hospital follow up.  Admitted to [facility] 12/26-12/27/24.  Went to urgent care and had swollen legs.  Sent to the ED for evaluation.  Admitted for shortness of breath and lower extremity swelling.  CHF was ruled out.  Not coughing up anything now.  Dyspnea improved.     Not feeling back to par yet.     Dolores area is swollen and painful. No dysuria. Hurts

## 2025-01-16 ENCOUNTER — CARE COORDINATION (OUTPATIENT)
Dept: CASE MANAGEMENT | Age: 80
End: 2025-01-16

## 2025-01-16 NOTE — CARE COORDINATION
Care Transitions Note    Follow Up Call     Patient: Ashley Melara               Patient : 1945   MRN: [id number]                             Reason for Admission: MHA x 20 hours -> obesity related venous stasis, HTN, DM2, HLD, incidental iron deficiency; ruled out-CHF, PNA, PE -> home w/ [facility] SN PT OT, f/up PCP for referral for colonoscopy  Discharge Date: 24     RURS: Readmission Risk Score: 14.1    Patient Current Location:  Home: [address] [place] [address] [place] [address]    Care Transition Nurse contacted the patient by telephone. Verified name and  as identifiers.    Additional needs identified to be addressed with provider   No needs identified         Method of communication with provider: none.    Care Summary Note:   Feels she is improving. Continues with home care - nurse was there today and visit went well. States the nurse has difficulty getting her BP but that the nurse is using a wrist cuff. States the nurse is not checking BP manually. Patient wears a smart watch  that can check her BP. Note she is SOB in conversation and she states it has been like that. Using nebulizer prn but missed using this morning. Urinating well.     Weighs first thing in morning in same clothes:  216# yesterday  218# today - She took a Benefiber last night for hard stool night before and after this weight she did have a BM.     CHF education:  -if you take a water pill - do not skip doses (takes HCTZ)  -weigh daily in the morning at the same time and in the same clothing  -report weight gain of >3#/day or >5#/week  -report increased SOB, edema, abd fullness, difficulty lying flat  -report palpitations, cough, difficulty urinating  -reviewed salt and fluid restrictions      Denies edema, abd fullness. States feels better around her waist. Last  yesterday 730pm. States staying busy around her home and doing her home exercises. Still trying to remember to elevate BLE twice daily for 30 min.

## 2025-01-21 ENCOUNTER — CARE COORDINATION (OUTPATIENT)
Dept: CASE MANAGEMENT | Age: 80
End: 2025-01-21

## 2025-01-21 ENCOUNTER — TELEPHONE (OUTPATIENT)
Dept: INTERNAL MEDICINE CLINIC | Age: 80
End: 2025-01-21

## 2025-01-21 NOTE — CARE COORDINATION
Care Transitions Note    Follow Up Call     Patient: Ashley Melara               Patient : 1945   MRN: [id number]                             Reason for Admission: MHA x 20 hours -> obesity related venous stasis, HTN, DM2, HLD, incidental iron deficiency; ruled out-CHF, PNA, PE -> home w/ [facility] SN PT OT, f/up PCP for referral for colonoscopy  Discharge Date: 24     RURS: Readmission Risk Score: 14.1    Patient Current Location:  Home: [address] [place] [address] [place] [address]    Care Transition Nurse contacted the patient by telephone. Verified name and  as identifiers.    Additional needs identified to be addressed with provider   No needs identified         Method of communication with provider: none.    Care Summary Note:   She is still SOB in conversation upon answering the phone today. States she just did her home exercises and is making lunch but SOB has improved.     Today's weight 216#    Denies fever, chills, cough. Appetite is okay. Denies LE edema - shoes/socks are not tight. [facility] [place] [facility] hasn't been out and only wearing socks inside. CTN advised to wear her shoes for safety or at least non skid socks. She does not wear compression stockings. Still elevating LE 1-2 times daily for 30 minutes. Reports her sleep is okay - went to bed late last night and wakes early for iron tablet 0630. Has adjustable bed she keeps the HOB elevated at her baseline to sleep - no more than usual. [facility] nurse was there at 0900 today and she didn't have any concerns. Reviewed HFU note to schedule with GI Dr Negron for colonoscopy and is scheduled with Dr Tapia for DEBORAH eval. Continues to monitor her VS, BS, SaO2 and all are reported WNL.     Originally she and spouse were planning to go to [place] for /Feb. That trip was canceled so they are now working on a 2 week round trip to [place] in February. They had a home in [place] in [facility].     Denies needs. Her breathing did improve in

## 2025-01-21 NOTE — TELEPHONE ENCOUNTER
----- Message from Dr. Han Fuentes MD sent at 1/21/2025  3:24 PM EST -----  Contact: Latonya [phone]  ok  ----- Message -----  From: Yola Wang  Sent: 1/21/2025   2:27 PM EST  To: Han Fuentes MD    Patient states she is getting home care twice a week.  ----- Message -----  From: Han Fuentes MD  Sent: 1/21/2025   1:56 PM EST  To: Yola Wang    Is she getting home care?  ----- Message -----  From: Lorraine Casanova  Sent: 1/21/2025  11:19 AM EST  To: Han Fuentes MD    [facility] wanting to know if you will sign and follow patient with home care orders?  Please advise

## 2025-01-28 ENCOUNTER — CARE COORDINATION (OUTPATIENT)
Dept: CASE MANAGEMENT | Age: 80
End: 2025-01-28

## 2025-01-28 PROBLEM — N18.31 STAGE 3A CHRONIC KIDNEY DISEASE (HCC): Status: ACTIVE | Noted: 2025-01-28

## 2025-01-28 NOTE — CARE COORDINATION
Care Transitions Note    Final Call     Patient: Ashley Melara               Patient : 1945   MRN: [id number]                             Reason for Admission: MHA x 20 hours -> obesity related venous stasis, HTN, DM2, HLD, incidental iron deficiency; ruled out-CHF, PNA, PE -> home w/ [facility] SN PT OT, f/up PCP for referral for colonoscopy  Discharge Date: 24     RURS: Readmission Risk Score: 14.1    Attempted to reach patient for transitions of care follow up.  Unable to reach patient.      Outreach Attempts:   Unable to leave message.     Patient graduated from the Care Transitions program on 25.      Handoff:   Patient was not referred to the ACM team due to  did not reach today and per last conversation traveling out of town to [place] in February.      Care Summary Note: NA    Upcoming Appointments:    Future Appointments         Provider Specialty Dept Phone    3/10/2025 8:40 AM Han Fuentes MD Internal Medicine [phone]    3/17/2025 9:30 AM Richard Tapia MD Pulmonology [phone]          Jodi Hare RN

## 2025-03-10 ENCOUNTER — OFFICE VISIT (OUTPATIENT)
Dept: INTERNAL MEDICINE CLINIC | Age: 80
End: 2025-03-10

## 2025-03-10 VITALS
RESPIRATION RATE: 12 BRPM | BODY MASS INDEX: 39.65 KG/M2 | DIASTOLIC BLOOD PRESSURE: 80 MMHG | WEIGHT: 210 LBS | SYSTOLIC BLOOD PRESSURE: 120 MMHG | HEIGHT: 61 IN | HEART RATE: 70 BPM

## 2025-03-10 DIAGNOSIS — E11.69 HYPERLIPIDEMIA ASSOCIATED WITH TYPE 2 DIABETES MELLITUS (HCC): ICD-10-CM

## 2025-03-10 DIAGNOSIS — E78.5 HYPERLIPIDEMIA ASSOCIATED WITH TYPE 2 DIABETES MELLITUS (HCC): ICD-10-CM

## 2025-03-10 DIAGNOSIS — F20.0 PARANOID TYPE SCHIZOPHRENIA (HCC): ICD-10-CM

## 2025-03-10 DIAGNOSIS — E66.01 MORBID OBESITY WITH BMI OF 40.0-44.9, ADULT: ICD-10-CM

## 2025-03-10 DIAGNOSIS — E11.9 TYPE 2 DIABETES MELLITUS WITHOUT COMPLICATION, WITHOUT LONG-TERM CURRENT USE OF INSULIN (HCC): Primary | ICD-10-CM

## 2025-03-10 DIAGNOSIS — I10 BENIGN ESSENTIAL HTN: ICD-10-CM

## 2025-03-10 DIAGNOSIS — N18.31 STAGE 3A CHRONIC KIDNEY DISEASE (HCC): ICD-10-CM

## 2025-03-10 PROBLEM — I50.9 HEART FAILURE: Status: RESOLVED | Noted: 2024-12-26 | Resolved: 2025-03-10

## 2025-03-10 PROCEDURE — 1159F MED LIST DOCD IN RCRD: CPT | Performed by: INTERNAL MEDICINE

## 2025-03-10 PROCEDURE — 3079F DIAST BP 80-89 MM HG: CPT | Performed by: INTERNAL MEDICINE

## 2025-03-10 PROCEDURE — 1160F RVW MEDS BY RX/DR IN RCRD: CPT | Performed by: INTERNAL MEDICINE

## 2025-03-10 PROCEDURE — 99214 OFFICE O/P EST MOD 30 MIN: CPT | Performed by: INTERNAL MEDICINE

## 2025-03-10 PROCEDURE — 1124F ACP DISCUSS-NO DSCNMKR DOCD: CPT | Performed by: INTERNAL MEDICINE

## 2025-03-10 PROCEDURE — 3074F SYST BP LT 130 MM HG: CPT | Performed by: INTERNAL MEDICINE

## 2025-03-10 ASSESSMENT — PATIENT HEALTH QUESTIONNAIRE - PHQ9
SUM OF ALL RESPONSES TO PHQ QUESTIONS 1-9: 0
SUM OF ALL RESPONSES TO PHQ QUESTIONS 1-9: 0
1. LITTLE INTEREST OR PLEASURE IN DOING THINGS: NOT AT ALL
2. FEELING DOWN, DEPRESSED OR HOPELESS: NOT AT ALL
SUM OF ALL RESPONSES TO PHQ QUESTIONS 1-9: 0
SUM OF ALL RESPONSES TO PHQ QUESTIONS 1-9: 0

## 2025-03-10 ASSESSMENT — ENCOUNTER SYMPTOMS
SHORTNESS OF BREATH: 0
WHEEZING: 0
NAUSEA: 0
VOMITING: 0
ABDOMINAL PAIN: 0
RHINORRHEA: 0

## 2025-03-10 NOTE — PROGRESS NOTES
Subjective:      Patient ID: Ashley Melara is a 79 y.o. female.    HPI  Patient is here for follow up of diabetes, hypertension, hyperlipidemia, OA, schizophrenia and heel pain.  Patient's BGs consistently in an acceptable range.  Patient does not have polydipsia and polyuria. She has been checking her sugar bid. Her sugars are ranging from 130-180 mg/dl. Her diabetes is well controlled. She takes Jardiance. No hypoglycemia. Her vision is getting worse. She does see an eye doctor. No numbness or tingling in her feet.   Patient's BP is controlled on current medications. No chest pain or dyspnea. Med compliant.  Her cholesterol is at goal. No myalgias.  She has mild edema.  She has schizophrenia. It is controlled.     She has some toe pain    Review of Systems   Constitutional:  Negative for appetite change and fatigue.   HENT:  Negative for postnasal drip and rhinorrhea.    Eyes:  Negative for visual disturbance.   Respiratory:  Negative for shortness of breath and wheezing.    Cardiovascular:  Negative for chest pain and palpitations.   Gastrointestinal:  Negative for abdominal pain, nausea and vomiting.   Endocrine: Negative for polydipsia, polyphagia and polyuria.   Musculoskeletal:  Negative for joint swelling.   Skin:  Negative for rash.   Neurological:  Negative for light-headedness and numbness.   Psychiatric/Behavioral:  Negative for sleep disturbance.          Current Outpatient Medications   Medication Instructions    Accu-Chek Softclix Lancets MISC USE TO TEST TWICE A DAY. DX:E11.9    acetaminophen (TYLENOL) 325 mg, Oral, PRN    albuterol (PROVENTIL) 2.5 mg, Nebulization, 4 TIMES DAILY PRN    albuterol sulfate HFA (VENTOLIN HFA) 108 (90 Base) MCG/ACT inhaler 2 puffs, Inhalation, 4 TIMES DAILY PRN    aspirin 81 mg, Oral, DAILY    Blood Glucose Monitoring Suppl (ACCU-CHEK NEY PLUS) w/Device KIT Check twice daily. DX: E11.9    blood glucose test strips (ACCU-CHEK NEY PLUS) strip USE TO TEST TWICE A

## 2025-03-11 ENCOUNTER — RESULTS FOLLOW-UP (OUTPATIENT)
Dept: INTERNAL MEDICINE CLINIC | Age: 80
End: 2025-03-11

## 2025-03-17 ENCOUNTER — OFFICE VISIT (OUTPATIENT)
Dept: PULMONOLOGY | Age: 80
End: 2025-03-17
Payer: MEDICARE

## 2025-03-17 VITALS
SYSTOLIC BLOOD PRESSURE: 113 MMHG | HEIGHT: 61 IN | WEIGHT: 209 LBS | OXYGEN SATURATION: 98 % | DIASTOLIC BLOOD PRESSURE: 70 MMHG | HEART RATE: 65 BPM | BODY MASS INDEX: 39.46 KG/M2 | TEMPERATURE: 97.3 F | RESPIRATION RATE: 20 BRPM

## 2025-03-17 DIAGNOSIS — G47.30 SLEEP APNEA, UNSPECIFIED TYPE: Primary | ICD-10-CM

## 2025-03-17 DIAGNOSIS — E66.9 OBESITY (BMI 30-39.9): ICD-10-CM

## 2025-03-17 PROCEDURE — 99204 OFFICE O/P NEW MOD 45 MIN: CPT | Performed by: INTERNAL MEDICINE

## 2025-03-17 PROCEDURE — 3074F SYST BP LT 130 MM HG: CPT | Performed by: INTERNAL MEDICINE

## 2025-03-17 PROCEDURE — 1159F MED LIST DOCD IN RCRD: CPT | Performed by: INTERNAL MEDICINE

## 2025-03-17 PROCEDURE — 1124F ACP DISCUSS-NO DSCNMKR DOCD: CPT | Performed by: INTERNAL MEDICINE

## 2025-03-17 PROCEDURE — 3078F DIAST BP <80 MM HG: CPT | Performed by: INTERNAL MEDICINE

## 2025-03-17 ASSESSMENT — SLEEP AND FATIGUE QUESTIONNAIRES
HOW LIKELY ARE YOU TO NOD OFF OR FALL ASLEEP WHILE LYING DOWN TO REST IN THE AFTERNOON WHEN CIRCUMSTANCES PERMIT: SLIGHT CHANCE OF DOZING
HOW LIKELY ARE YOU TO NOD OFF OR FALL ASLEEP WHILE WATCHING TV: SLIGHT CHANCE OF DOZING
HOW LIKELY ARE YOU TO NOD OFF OR FALL ASLEEP WHILE SITTING AND READING: SLIGHT CHANCE OF DOZING
HOW LIKELY ARE YOU TO NOD OFF OR FALL ASLEEP WHILE SITTING QUIETLY AFTER LUNCH WITHOUT ALCOHOL: SLIGHT CHANCE OF DOZING
HOW LIKELY ARE YOU TO NOD OFF OR FALL ASLEEP IN A CAR, WHILE STOPPED FOR A FEW MINUTES IN TRAFFIC: WOULD NEVER DOZE
HOW LIKELY ARE YOU TO NOD OFF OR FALL ASLEEP WHEN YOU ARE A PASSENGER IN A CAR FOR AN HOUR WITHOUT A BREAK: WOULD NEVER DOZE
ESS TOTAL SCORE: 4
HOW LIKELY ARE YOU TO NOD OFF OR FALL ASLEEP WHILE SITTING INACTIVE IN A PUBLIC PLACE: WOULD NEVER DOZE
HOW LIKELY ARE YOU TO NOD OFF OR FALL ASLEEP WHILE SITTING AND TALKING TO SOMEONE: WOULD NEVER DOZE

## 2025-03-17 NOTE — PATIENT INSTRUCTIONS
What's next:  The Sleep Center at [facility] - [address], [address], [place] [address]  The Sleep center at [facility] - [address], [address], [place] [address]  Phone for both is: [phone] Fax: [phone]    The sleep lab will call you within the next 1 week to schedule a study (if the sleep lab does not reach you within 1 week, please call them at [phone])    Meet with our sleep NP or PA after your sleep study to discuss results, options and recommendations.  If you do not have an appointment already scheduled and have not heard from my office within one week of your sleep study, please call my office to schedule an appointment.          Please refrain from or reduce the use of caffeine and/or alcohol prior to your sleep study and avoid napping the day of your study, as these will affect the accuracy of your test results.  If you are ill the day of your test (COVID-19, cold, upper respiratory infection, flu, etc.) please call to reschedule your test as the test will not be accurate, and for other patients' safety. Avoid napping the day prior to your sleep study as this may make it harder to fall asleep.     If you have any questions or need to cancel/reschedule your appointment, please call the sleep lab @ [phone]     For at home testing: when you come to  the rental unit, please bring:  I.D. and Insurance card  ** Please note the Home Sleep Test Units are limited. It is a 1 night rental and must be dropped off the following day to ensure you are not billed a late or replacement fee. **    For Patients being evaluated for a sleep disorder, including sleep apnea:  Never drive a car or operate a motorized vehicle while drowsy or sleepy.  Maintaining an optimal weight can help limit the severity of sleep apnea.  Treating sleep apnea effectively may help reduce the risk of heart disease, stroke, car accidents, type II diabetes & all cause mortality    Important

## 2025-03-17 NOTE — PROGRESS NOTES
SLEEP MEDICINE CONSULT NOTE  CC & Referring provider: Patient is being seen at the request of Dr. Fuentes/Elvia Boyd for a consultation to evaluate for sleep concern.     Presenting HPI 3/17/25:   History of Present Illness  The patient presents for evaluation of sleep disturbances.    She reports experiencing nocturnal arousals 2-3 times per night, often necessitating micturition, and excessive daytime sleepiness with a propensity to fall asleep when unstimulated. She also notes snoring and occasional xerostomia upon awakening. There is no reported history of sleep apnea. The patient has a history of lower extremity edema, which has shown improvement but remains unresolved. She was previously on amlodipine, which was discontinued due to the development of edema. The patient expresses a preference for home-based polysomnography. A colonoscopy is scheduled for Monday. She does not frequently engage in driving.    MEDICATIONS  Discontinued: amlodipine           3/17/2025     9:17 AM   Sleep Medicine   Sitting and reading 1   Watching TV 1   Sitting, inactive in a public place (e.g. a theatre or a meeting) 0   As a passenger in a car for an hour without a break 0   Lying down to rest in the afternoon when circumstances permit 1   Sitting and talking to someone 0   Sitting quietly after a lunch without alcohol 1   In a car, while stopped for a few minutes in traffic 0   Zoar Sleepiness Score 4   Neck (Inches) 13        Past Medical History:   Diagnosis Date    Closed displaced fracture of greater tuberosity of right humerus     COPD (chronic obstructive pulmonary disease) ([facility])     Hyperlipidemia     Hyperlipidemia associated with type 2 diabetes mellitus ([facility]) 10/05/2015    Hypertension     Morbid obesity with BMI of 40.0-44.9, adult 10/05/2015    Osteoarthrosis, not specified whether generalized/localized, lower leg     Paranoid type schizophrenia ([facility])     Syncope and collapse 07/20/2021    Type 2 diabetes

## 2025-03-17 NOTE — PROGRESS NOTES
MA Communication:  The following orders are received by verbal communication from Richard Tapia MD        Orders include:          HST fu after with Dr Tapia PA or NP

## 2025-03-27 RX ORDER — LANCETS
EACH MISCELLANEOUS
Qty: 100 EACH | Refills: 3 | Status: SHIPPED | OUTPATIENT
Start: 2025-03-27

## 2025-03-28 ENCOUNTER — HOSPITAL ENCOUNTER (OUTPATIENT)
Dept: SLEEP CENTER | Age: 80
Discharge: HOME | End: 2025-03-30
Payer: MEDICARE

## 2025-03-28 DIAGNOSIS — G47.30 SLEEP APNEA, UNSPECIFIED TYPE: ICD-10-CM

## 2025-03-28 PROCEDURE — 95806 SLEEP STUDY UNATT&RESP EFFT: CPT

## 2025-04-01 PROBLEM — G47.30 SLEEP APNEA: Status: ACTIVE | Noted: 2025-04-01

## 2025-04-02 ENCOUNTER — RESULTS FOLLOW-UP (OUTPATIENT)
Dept: PULMONOLOGY | Age: 80
End: 2025-04-02

## 2025-04-09 ENCOUNTER — TELEPHONE (OUTPATIENT)
Dept: PULMONOLOGY | Age: 80
End: 2025-04-09

## 2025-04-09 ENCOUNTER — OFFICE VISIT (OUTPATIENT)
Dept: PULMONOLOGY | Age: 80
End: 2025-04-09
Payer: MEDICARE

## 2025-04-09 VITALS
RESPIRATION RATE: 20 BRPM | HEART RATE: 56 BPM | BODY MASS INDEX: 38.71 KG/M2 | DIASTOLIC BLOOD PRESSURE: 64 MMHG | SYSTOLIC BLOOD PRESSURE: 125 MMHG | HEIGHT: 61 IN | OXYGEN SATURATION: 98 % | WEIGHT: 205 LBS

## 2025-04-09 DIAGNOSIS — R35.1 NOCTURIA: ICD-10-CM

## 2025-04-09 DIAGNOSIS — G47.33 SEVERE OBSTRUCTIVE SLEEP APNEA: Primary | ICD-10-CM

## 2025-04-09 DIAGNOSIS — G47.34 NOCTURNAL HYPOXIA: ICD-10-CM

## 2025-04-09 PROCEDURE — 99214 OFFICE O/P EST MOD 30 MIN: CPT

## 2025-04-09 PROCEDURE — 3074F SYST BP LT 130 MM HG: CPT

## 2025-04-09 PROCEDURE — 1159F MED LIST DOCD IN RCRD: CPT

## 2025-04-09 PROCEDURE — 1124F ACP DISCUSS-NO DSCNMKR DOCD: CPT

## 2025-04-09 PROCEDURE — 3078F DIAST BP <80 MM HG: CPT

## 2025-04-09 ASSESSMENT — SLEEP AND FATIGUE QUESTIONNAIRES
HOW LIKELY ARE YOU TO NOD OFF OR FALL ASLEEP WHILE SITTING AND READING: SLIGHT CHANCE OF DOZING
HOW LIKELY ARE YOU TO NOD OFF OR FALL ASLEEP WHILE WATCHING TV: SLIGHT CHANCE OF DOZING
HOW LIKELY ARE YOU TO NOD OFF OR FALL ASLEEP WHILE LYING DOWN TO REST IN THE AFTERNOON WHEN CIRCUMSTANCES PERMIT: MODERATE CHANCE OF DOZING
HOW LIKELY ARE YOU TO NOD OFF OR FALL ASLEEP WHILE SITTING QUIETLY AFTER LUNCH WITHOUT ALCOHOL: SLIGHT CHANCE OF DOZING
ESS TOTAL SCORE: 7
HOW LIKELY ARE YOU TO NOD OFF OR FALL ASLEEP IN A CAR, WHILE STOPPED FOR A FEW MINUTES IN TRAFFIC: WOULD NEVER DOZE
HOW LIKELY ARE YOU TO NOD OFF OR FALL ASLEEP WHEN YOU ARE A PASSENGER IN A CAR FOR AN HOUR WITHOUT A BREAK: SLIGHT CHANCE OF DOZING
HOW LIKELY ARE YOU TO NOD OFF OR FALL ASLEEP WHILE SITTING AND TALKING TO SOMEONE: WOULD NEVER DOZE
HOW LIKELY ARE YOU TO NOD OFF OR FALL ASLEEP WHILE SITTING INACTIVE IN A PUBLIC PLACE: SLIGHT CHANCE OF DOZING

## 2025-04-09 NOTE — TELEPHONE ENCOUNTER
PAP orders faxed, with testing/OV note/demographics/insurance, to [facility] via [employer] at [phone].  Notified PSS.

## 2025-04-09 NOTE — PATIENT INSTRUCTIONS
What's next:  Trying auto-CPAP:  Let our office know to which medical supply company (\"DME company\") we should send the CPAP prescription.  The best way to choose a company is by calling your insurance and asking which DME companies are in network for you, or searching on your insurance's website, and choosing from those options.  That DME company will run an authorization for CPAP through your insurance and then get you set up with your machine and equipment.    The masks you liked most today were:   #1:  ResMed AirFit F40  #2:  ResMed AirFit F30i  After getting home with your machine, try to de-sensitize yourself to the CPAP & mask - people often do better if they try it out during the day and practice breathing with it while focusing on another task, such as reading, playing a game or watching TV.  You can ask your DME for a different mask if what you first tried isn't comfortable.  Often times, DME companies will allow you to trade out a mask if you ask within the first two weeks.   We prefer nasal mask or nasal pillows instead of full face masks for the treatment of DEBORAH.  People who cannot use a nasal interface should change to a full face mask.    Call or message our office if the air pressures are not tolerable.  It is possible we can make adjustments to the settings while keeping your treatment benefit in mind.    After getting set up with CPAP, you must be seen within 31-90 days.  At this appointment, insurance typically needs to see that you are using the device at least 4 hours each night for at least 70% of nights in a month.  Please bring your machine and power cord to this appointment.   Continue to work on sleep hygiene tips listed below.      It was great to meet you and take care Ashley!  -Yissel      ______________________________________________________________________  Never drive a car or operate a motorized vehicle while drowsy or sleepy.

## 2025-04-09 NOTE — PROGRESS NOTES
MA Communication:  The following orders are received by verbal communication from Yissel Alcantara PA-C.     Orders include:    New PAP set u   31-90 day   Compliance requirements reviewed     
6.7, hemoglobin 15.6, last TSH 1.64    ASSESSMENT:   Severe DEBORAH, AHI 46 on HST   Daytime sleepiness  Snoring  Dry mouth waking up   Frequent nocturnal awakenings   Nocturia   Nocturnal hypoxia   Obesity BMI 39  Comorbid conditions: T2Dm,   Never smoker     PLAN:  Start APAP 5-15 cmH2O ResMed AirFit F40.  Await to hear pt's DME choice.   DME list given  Discussed PAP titration vs APAP.  Discussed PAP desensitization techniques.   AirFit F40 > F30i   Sleep hygiene & CPAP cleaning tips discussed & provided  Patient has been advised: no driving while sleepy; weight loss recommended.  Pathophysiology & complications of untreated DEBORAH & systemic benefits of CPAP therapy have been discussed.   Discussed alternatives to CPAP therapy, including HNS & Zepbound (has DM)   F/U 31-90        The patient (or guardian, if applicable) and other individuals in attendance with the patient were advised that Artificial Intelligence will be utilized during this visit to record, process the conversation to generate a clinical note, and support improvement of the AI technology. The patient (or guardian, if applicable) and other individuals in attendance at the appointment consented to the use of AI, including the recording.

## 2025-04-24 RX ORDER — FERROUS SULFATE 325(65) MG
325 TABLET ORAL 2 TIMES DAILY WITH MEALS
Qty: 180 TABLET | Refills: 0 | Status: SHIPPED | OUTPATIENT
Start: 2025-04-24

## 2025-04-24 NOTE — TELEPHONE ENCOUNTER
----- Message from Dr. Han Fuentes MD sent at 4/24/2025  1:37 PM EDT -----  Contact: GARY  [phone]  parker  ----- Message -----  From: Hiral Johnson  Sent: 4/24/2025   9:29 AM EDT  To: Han Fuentes MD    Patient asking if she is to continue the below script from historical provider as she is out of refills. Please advise     ferrous sulfate (IRON 325) 325 (65 Fe) MG tablet       [id number] - [place], [place] - [address] [address] -  [phone] - F [phone] [address] [address]  Phone: [phone]  Fax: [phone]  
no

## 2025-04-25 ENCOUNTER — TELEPHONE (OUTPATIENT)
Dept: PULMONOLOGY | Age: 80
End: 2025-04-25

## 2025-04-25 NOTE — TELEPHONE ENCOUNTER
Patient calling c/o was seen on 4.9.25.  Plan was to order a sleep mask.  Patient stated someone was going to verify it was covered under insurance and send her a letter.  She has not received anything or heard from anyone.  Please call patient and advise.

## 2025-04-25 NOTE — TELEPHONE ENCOUNTER
Spoke with pt and informed that per our notes, [facility] didn't get the first orders sent, so they were resent last week.  [facility] will be obtaining authorization for pt and reaching out to her to set up.  Advised pt to follow up with [facility] mid next week if she has not heard from them. Pt verbalized understanding .

## 2025-05-13 ENCOUNTER — TELEPHONE (OUTPATIENT)
Dept: PULMONOLOGY | Age: 80
End: 2025-05-13

## 2025-05-22 RX ORDER — BLOOD SUGAR DIAGNOSTIC
STRIP MISCELLANEOUS
Qty: 200 STRIP | Refills: 3 | Status: SHIPPED | OUTPATIENT
Start: 2025-05-22

## 2025-06-11 ENCOUNTER — OFFICE VISIT (OUTPATIENT)
Dept: PULMONOLOGY | Age: 80
End: 2025-06-11
Payer: MEDICARE

## 2025-06-11 VITALS
BODY MASS INDEX: 37.38 KG/M2 | HEART RATE: 58 BPM | DIASTOLIC BLOOD PRESSURE: 83 MMHG | RESPIRATION RATE: 24 BRPM | SYSTOLIC BLOOD PRESSURE: 129 MMHG | OXYGEN SATURATION: 96 % | HEIGHT: 61 IN | WEIGHT: 198 LBS

## 2025-06-11 DIAGNOSIS — R40.0 DAYTIME SLEEPINESS: ICD-10-CM

## 2025-06-11 DIAGNOSIS — G47.33 SEVERE OBSTRUCTIVE SLEEP APNEA: Primary | ICD-10-CM

## 2025-06-11 DIAGNOSIS — G47.34 NOCTURNAL HYPOXIA: ICD-10-CM

## 2025-06-11 DIAGNOSIS — E66.9 OBESITY (BMI 30-39.9): ICD-10-CM

## 2025-06-11 PROCEDURE — 1124F ACP DISCUSS-NO DSCNMKR DOCD: CPT

## 2025-06-11 PROCEDURE — 3079F DIAST BP 80-89 MM HG: CPT

## 2025-06-11 PROCEDURE — 1159F MED LIST DOCD IN RCRD: CPT

## 2025-06-11 PROCEDURE — 3074F SYST BP LT 130 MM HG: CPT

## 2025-06-11 PROCEDURE — 99214 OFFICE O/P EST MOD 30 MIN: CPT

## 2025-06-11 ASSESSMENT — SLEEP AND FATIGUE QUESTIONNAIRES
HOW LIKELY ARE YOU TO NOD OFF OR FALL ASLEEP WHEN YOU ARE A PASSENGER IN A CAR FOR AN HOUR WITHOUT A BREAK: SLIGHT CHANCE OF DOZING
HOW LIKELY ARE YOU TO NOD OFF OR FALL ASLEEP WHILE LYING DOWN TO REST IN THE AFTERNOON WHEN CIRCUMSTANCES PERMIT: HIGH CHANCE OF DOZING
HOW LIKELY ARE YOU TO NOD OFF OR FALL ASLEEP WHILE SITTING AND READING: SLIGHT CHANCE OF DOZING
ESS TOTAL SCORE: 10
HOW LIKELY ARE YOU TO NOD OFF OR FALL ASLEEP WHILE SITTING INACTIVE IN A PUBLIC PLACE: SLIGHT CHANCE OF DOZING
HOW LIKELY ARE YOU TO NOD OFF OR FALL ASLEEP WHILE SITTING QUIETLY AFTER LUNCH WITHOUT ALCOHOL: SLIGHT CHANCE OF DOZING
HOW LIKELY ARE YOU TO NOD OFF OR FALL ASLEEP IN A CAR, WHILE STOPPED FOR A FEW MINUTES IN TRAFFIC: SLIGHT CHANCE OF DOZING
HOW LIKELY ARE YOU TO NOD OFF OR FALL ASLEEP WHILE SITTING AND TALKING TO SOMEONE: SLIGHT CHANCE OF DOZING
HOW LIKELY ARE YOU TO NOD OFF OR FALL ASLEEP WHILE WATCHING TV: SLIGHT CHANCE OF DOZING

## 2025-06-11 NOTE — PROGRESS NOTES
MA Communication:  The following orders are received by verbal communication from Yissel Alcantara PA-C.     Orders include:    Reminder message made to call pt   HST result printed for pt

## 2025-06-11 NOTE — PROGRESS NOTES
SLEEP MEDICINE PROGRESS NOTE  CC & Referring provider: Dr. Fuentes/Elvia Boyd to evaluate for sleep concern.     Interval History 6/11/25:  31-90  -  Set up with AirSense 10 but returned about 1 month ago d/t inability to tolerate.  Could wear mask without PAP turned on, but was unable to make progress with acclimating to CPAP with it running.  Air pressures were not overwhelming, did not feel too high or too low, but were uncomfortable.  Mask would shift and leak air.  She is not interested in any further attempts at CPAP.  There seems to be several moments of confusion about her sleep quality and the goal of treatment, citing her watch and coughing through the night.  So extended time was spent reviewing HST results and significant associated hypoxia with her severe DEBORAH.  Thoroughly discussed treatment options.  Pt not interested in surgery / Inspire.  It's unclear if she is open to considering an oral appliance.  She does not want to pursue this until the CPAP charges are known and completed.  She would prefer to call the clinic back and notify us of desire to proceed with MAD.  She has T2DM and so Zepbound is not something I would manage.   -  DEBORAH treated with benefit with APAP 5-15; used 2:21 hrs avg with compliance >4 hour use 2/10 (used 8) days, residual AHI 7.3 (5.3 obstructives).  Pressures: median 6.6, 95th% 10.3, max 10.8.  ESS: 10.       Interval History 4/9/25:  f/u HST  -  Had HST 3/28/2025 demonstrating severe DEBORAH with AHI 46.  ESS is: 7.  Tolerated HST equipment well. After some extra explanation, pt & spouse understand result.  Have not heard great things about CPAP but admits they are outdated stories.  Tends to breathe through her mouth during the day. She is curious if regular use of albuterol could potentially improve her DEBORAH or change the dx.  Tolerated 2 hybrid FFM styles today.       Presenting HPI 3/17/25 Tapia:  Pt presents for evaluation of sleep disturbances.  She reports experiencing

## 2025-06-11 NOTE — PATIENT INSTRUCTIONS
Ashley,   Please keep in mind your obstructive sleep apnea is quite severe.  You are having 45 sleep apnea events per hour.  Your oxygen levels on your home sleep study were down to 66% and you spend over 2 hours of the night with oxygen levels below 90%.  Getting this treated will likely reduce your risk of several health conditions and also to help with your sleep quality and how you feel during the daytime.      CALL OUR OFFICE IF YOU DECIDE TO PROCEED WITH GETTING INFORMATION ON AN ORAL APPLIANCE OR \"MOUTH GUARD\"     Oral appliance therapy for obstructive sleep apnea:    [employer] is a DME company that may be able to bill an oral appliance as medical equipment through your insurance.           Somnoguard, a boil and bite option available with prescription from your sleep MD PA or NP:  THIS IS AN OPTION THAT IS $170 AND IS ORDERED ONLINE.

## 2025-06-17 RX ORDER — VALSARTAN 320 MG/1
320 TABLET ORAL DAILY
Qty: 90 TABLET | Refills: 1 | Status: SHIPPED | OUTPATIENT
Start: 2025-06-17

## 2025-06-17 RX ORDER — HYDROCHLOROTHIAZIDE 25 MG/1
25 TABLET ORAL EVERY MORNING
Qty: 90 TABLET | Refills: 1 | Status: SHIPPED | OUTPATIENT
Start: 2025-06-17

## 2025-06-17 NOTE — TELEPHONE ENCOUNTER
----- Message from Dr. Han Fuentes MD sent at 6/17/2025  6:51 AM EDT -----  Contact: patient [phone]  ok  ----- Message -----  From: Lorraine Casanova  Sent: 6/16/2025   4:20 PM EDT  To: Han Fuentes MD    Patient new scripts from you for the following medications below, that were prescribed to her while in the hospital    valsartan (DIOVAN) 320 MG tablet  hydroCHLOROthiazide (HYDRODIURIL) 25 MG tablet  empagliflozin (JARDIANCE) 10 MG tablet      [facility]/pharmacy #[id number] [place] [address] -  [phone] -  [phone]

## 2025-07-11 ENCOUNTER — TELEPHONE (OUTPATIENT)
Dept: PULMONOLOGY | Age: 80
End: 2025-07-11

## 2025-07-11 NOTE — TELEPHONE ENCOUNTER
----- Message from HALEY KINCAID MA sent at 6/11/2025 10:54 AM EDT -----  F/U PRN.  Please call in 1 month if have not heard from patient and ask if she would like Rx to [facility]

## 2025-07-21 RX ORDER — FERROUS SULFATE 325(65) MG
1 TABLET ORAL 2 TIMES DAILY WITH MEALS
Qty: 180 TABLET | Refills: 0 | Status: SHIPPED | OUTPATIENT
Start: 2025-07-21

## 2025-07-31 ENCOUNTER — RESULTS FOLLOW-UP (OUTPATIENT)
Dept: INTERNAL MEDICINE CLINIC | Age: 80
End: 2025-07-31

## 2025-07-31 ENCOUNTER — OFFICE VISIT (OUTPATIENT)
Dept: INTERNAL MEDICINE CLINIC | Age: 80
End: 2025-07-31

## 2025-07-31 VITALS
DIASTOLIC BLOOD PRESSURE: 80 MMHG | HEIGHT: 61 IN | SYSTOLIC BLOOD PRESSURE: 128 MMHG | HEART RATE: 70 BPM | BODY MASS INDEX: 35.87 KG/M2 | RESPIRATION RATE: 12 BRPM | WEIGHT: 190 LBS

## 2025-07-31 DIAGNOSIS — E11.9 TYPE 2 DIABETES MELLITUS WITHOUT COMPLICATION, WITHOUT LONG-TERM CURRENT USE OF INSULIN (HCC): ICD-10-CM

## 2025-07-31 DIAGNOSIS — G47.33 SEVERE OBSTRUCTIVE SLEEP APNEA: ICD-10-CM

## 2025-07-31 DIAGNOSIS — E78.5 HYPERLIPIDEMIA ASSOCIATED WITH TYPE 2 DIABETES MELLITUS (HCC): ICD-10-CM

## 2025-07-31 DIAGNOSIS — R31.9 HEMATURIA, UNSPECIFIED TYPE: Primary | ICD-10-CM

## 2025-07-31 DIAGNOSIS — Z00.00 MEDICARE ANNUAL WELLNESS VISIT, SUBSEQUENT: Primary | ICD-10-CM

## 2025-07-31 DIAGNOSIS — E11.69 HYPERLIPIDEMIA ASSOCIATED WITH TYPE 2 DIABETES MELLITUS (HCC): ICD-10-CM

## 2025-07-31 DIAGNOSIS — E66.01 MORBID OBESITY WITH BMI OF 40.0-44.9, ADULT (HCC): ICD-10-CM

## 2025-07-31 DIAGNOSIS — I10 BENIGN ESSENTIAL HTN: ICD-10-CM

## 2025-07-31 DIAGNOSIS — Z86.73 HISTORY OF CVA (CEREBROVASCULAR ACCIDENT): ICD-10-CM

## 2025-07-31 DIAGNOSIS — F20.0 PARANOID TYPE SCHIZOPHRENIA (HCC): ICD-10-CM

## 2025-07-31 DIAGNOSIS — N18.31 STAGE 3A CHRONIC KIDNEY DISEASE (HCC): ICD-10-CM

## 2025-07-31 LAB
BILIRUBIN, POC: NORMAL
BLOOD URINE, POC: NORMAL
CLARITY, POC: NORMAL
COLOR, POC: NORMAL
GLUCOSE URINE, POC: NORMAL MG/DL
KETONES, POC: NORMAL MG/DL
LEUKOCYTE EST, POC: NORMAL
NITRITE, POC: NORMAL
PH, POC: NORMAL
PROTEIN, POC: NORMAL MG/DL
SPECIFIC GRAVITY, POC: NORMAL
UROBILINOGEN, POC: NORMAL MG/DL

## 2025-07-31 PROCEDURE — 81002 URINALYSIS NONAUTO W/O SCOPE: CPT | Performed by: INTERNAL MEDICINE

## 2025-07-31 PROCEDURE — G0439 PPPS, SUBSEQ VISIT: HCPCS | Performed by: INTERNAL MEDICINE

## 2025-07-31 PROCEDURE — 3074F SYST BP LT 130 MM HG: CPT | Performed by: INTERNAL MEDICINE

## 2025-07-31 PROCEDURE — 1160F RVW MEDS BY RX/DR IN RCRD: CPT | Performed by: INTERNAL MEDICINE

## 2025-07-31 PROCEDURE — 3079F DIAST BP 80-89 MM HG: CPT | Performed by: INTERNAL MEDICINE

## 2025-07-31 PROCEDURE — 3044F HG A1C LEVEL LT 7.0%: CPT | Performed by: INTERNAL MEDICINE

## 2025-07-31 PROCEDURE — 1159F MED LIST DOCD IN RCRD: CPT | Performed by: INTERNAL MEDICINE

## 2025-07-31 PROCEDURE — 1124F ACP DISCUSS-NO DSCNMKR DOCD: CPT | Performed by: INTERNAL MEDICINE

## 2025-07-31 ASSESSMENT — PATIENT HEALTH QUESTIONNAIRE - PHQ9
1. LITTLE INTEREST OR PLEASURE IN DOING THINGS: NOT AT ALL
SUM OF ALL RESPONSES TO PHQ QUESTIONS 1-9: 0
2. FEELING DOWN, DEPRESSED OR HOPELESS: NOT AT ALL
SUM OF ALL RESPONSES TO PHQ QUESTIONS 1-9: 0

## 2025-07-31 NOTE — TELEPHONE ENCOUNTER
----- Message from HALEY SHEETS sent at 7/31/2025  9:32 AM EDT -----  Per Dr. Fuentes- UA shows trace blood and glucose, repeat UA in 2 weeks.

## 2025-07-31 NOTE — PROGRESS NOTES
Medicare Annual Wellness Visit    Ashley Melara is here for Medicare AWV    Assessment & Plan   Medicare annual wellness visit, subsequent  Type 2 diabetes mellitus without complication, without long-term current use of insulin (HCC)  -     Comprehensive Metabolic Panel; Future  -     CBC with Auto Differential; Future  -     Hemoglobin A1C; Future  -     Lipid Panel; Future  -     POCT Urinalysis no Micro  -     Albumin/Creatinine Ratio, Urine  Benign essential HTN  Hyperlipidemia associated with type 2 diabetes mellitus (HCC)  Morbid obesity with BMI of 40.0-44.9, adult (HCC)  Paranoid type schizophrenia (HCC)  Severe obstructive sleep apnea  Stage 3a chronic kidney disease (HCC)  History of CVA (cerebrovascular accident)      Medicare exam done  DM type 2 well controlled.  Primary HTN controlled  HLD check labs  DEBORAH stable  Schizophrenia controlled  H/O CVA on ASA  CKD check labs.   She is losing weight.     No follow-ups on file.     Subjective         She is here for a Medicare exam.    Patient is here for follow up of diabetes, hypertension, hyperlipidemia, OA, schizophrenia and heel pain.    She was hospitalized in December 2024 at [facility]. Imaging showed a remote CVA and she has been taking ASA.    Patient's BGs consistently in an acceptable range.  Patient does not have polydipsia and polyuria. She has been checking her sugar bid. Her sugars are ranging from 130-180 mg/dl. Her diabetes is well controlled. She takes Jardiance. No hypoglycemia. Her vision is getting worse. She does see an eye doctor. No numbness or tingling in her feet.  She has lost 36 lbs.     Patient's BP is controlled on current medications. No chest pain or dyspnea. Med compliant.    Her cholesterol is at goal. No myalgias.    She has mild edema.    She has schizophrenia. It is controlled.       Patient's complete Health Risk Assessment and screening values have been reviewed and are found in Flowsheets. The following problems were

## 2025-08-01 LAB
ALBUMIN SERPL-MCNC: 4.2 G/DL (ref 3.4–5)
ALBUMIN/GLOB SERPL: 1.7 {RATIO} (ref 1.1–2.2)
ALP SERPL-CCNC: 93 U/L (ref 40–129)
ALT SERPL-CCNC: 20 U/L (ref 10–40)
ANION GAP SERPL CALCULATED.3IONS-SCNC: 11 MMOL/L (ref 3–16)
AST SERPL-CCNC: 27 U/L (ref 15–37)
BASOPHILS # BLD: 0 K/UL (ref 0–0.2)
BASOPHILS NFR BLD: 0.6 %
BILIRUB SERPL-MCNC: 0.5 MG/DL (ref 0–1)
BUN SERPL-MCNC: 16 MG/DL (ref 7–20)
CALCIUM SERPL-MCNC: 9.8 MG/DL (ref 8.3–10.6)
CHLORIDE SERPL-SCNC: 100 MMOL/L (ref 99–110)
CHOLEST SERPL-MCNC: 143 MG/DL (ref 0–199)
CO2 SERPL-SCNC: 29 MMOL/L (ref 21–32)
CREAT SERPL-MCNC: 0.9 MG/DL (ref 0.6–1.2)
CREAT UR-MCNC: 94.6 MG/DL (ref 28–259)
DEPRECATED RDW RBC AUTO: 15.4 % (ref 12.4–15.4)
EOSINOPHIL # BLD: 0.2 K/UL (ref 0–0.6)
EOSINOPHIL NFR BLD: 3 %
EST. AVERAGE GLUCOSE BLD GHB EST-MCNC: 154.2 MG/DL
GFR SERPLBLD CREATININE-BSD FMLA CKD-EPI: 65 ML/MIN/{1.73_M2}
GLUCOSE SERPL-MCNC: 134 MG/DL (ref 70–99)
HBA1C MFR BLD: 7 %
HCT VFR BLD AUTO: 47.1 % (ref 36–48)
HDLC SERPL-MCNC: 57 MG/DL (ref 40–60)
HGB BLD-MCNC: 15.6 G/DL (ref 12–16)
LDLC SERPL CALC-MCNC: 58 MG/DL
LYMPHOCYTES # BLD: 1 K/UL (ref 1–5.1)
LYMPHOCYTES NFR BLD: 15.7 %
MCH RBC QN AUTO: 28.8 PG (ref 26–34)
MCHC RBC AUTO-ENTMCNC: 33.2 G/DL (ref 31–36)
MCV RBC AUTO: 86.9 FL (ref 80–100)
MICROALBUMIN UR DL<=1MG/L-MCNC: <1.2 MG/DL
MICROALBUMIN/CREAT UR: NORMAL MG/G (ref 0–30)
MONOCYTES # BLD: 0.5 K/UL (ref 0–1.3)
MONOCYTES NFR BLD: 7.9 %
NEUTROPHILS # BLD: 4.8 K/UL (ref 1.7–7.7)
NEUTROPHILS NFR BLD: 72.8 %
PLATELET # BLD AUTO: 227 K/UL (ref 135–450)
PMV BLD AUTO: 9.7 FL (ref 5–10.5)
POTASSIUM SERPL-SCNC: 3.9 MMOL/L (ref 3.5–5.1)
PROT SERPL-MCNC: 6.7 G/DL (ref 6.4–8.2)
RBC # BLD AUTO: 5.42 M/UL (ref 4–5.2)
SODIUM SERPL-SCNC: 140 MMOL/L (ref 136–145)
TRIGL SERPL-MCNC: 138 MG/DL (ref 0–150)
VLDLC SERPL CALC-MCNC: 28 MG/DL
WBC # BLD AUTO: 6.6 K/UL (ref 4–11)

## 2025-08-18 ENCOUNTER — TELEPHONE (OUTPATIENT)
Dept: INTERNAL MEDICINE CLINIC | Age: 80
End: 2025-08-18

## 2025-08-18 DIAGNOSIS — M79.676 PAIN OF TOE, UNSPECIFIED LATERALITY: Primary | ICD-10-CM

## 2025-08-18 RX ORDER — LANCETS
EACH MISCELLANEOUS
Qty: 200 EACH | Refills: 3 | Status: SHIPPED | OUTPATIENT
Start: 2025-08-18

## 2025-08-19 ENCOUNTER — TELEPHONE (OUTPATIENT)
Dept: INTERNAL MEDICINE CLINIC | Age: 80
End: 2025-08-19
